# Patient Record
Sex: FEMALE | Race: WHITE | NOT HISPANIC OR LATINO | Employment: OTHER | URBAN - METROPOLITAN AREA
[De-identification: names, ages, dates, MRNs, and addresses within clinical notes are randomized per-mention and may not be internally consistent; named-entity substitution may affect disease eponyms.]

---

## 2021-08-19 ENCOUNTER — TRANSFERRED RECORDS (OUTPATIENT)
Dept: MULTI SPECIALTY CLINIC | Facility: CLINIC | Age: 61
End: 2021-08-19

## 2022-02-23 ENCOUNTER — TRANSFERRED RECORDS (OUTPATIENT)
Dept: MULTI SPECIALTY CLINIC | Facility: CLINIC | Age: 62
End: 2022-02-23

## 2022-06-27 ENCOUNTER — OFFICE VISIT (OUTPATIENT)
Dept: URGENT CARE | Facility: URGENT CARE | Age: 62
End: 2022-06-27
Payer: COMMERCIAL

## 2022-06-27 VITALS
WEIGHT: 165 LBS | HEART RATE: 86 BPM | BODY MASS INDEX: 32.39 KG/M2 | SYSTOLIC BLOOD PRESSURE: 124 MMHG | RESPIRATION RATE: 15 BRPM | DIASTOLIC BLOOD PRESSURE: 80 MMHG | HEIGHT: 60 IN | OXYGEN SATURATION: 97 % | TEMPERATURE: 97.9 F

## 2022-06-27 DIAGNOSIS — S01.81XA FACIAL LACERATION, INITIAL ENCOUNTER: Primary | ICD-10-CM

## 2022-06-27 PROCEDURE — 12013 RPR F/E/E/N/L/M 2.6-5.0 CM: CPT | Performed by: PHYSICIAN ASSISTANT

## 2022-06-27 PROCEDURE — 90715 TDAP VACCINE 7 YRS/> IM: CPT | Performed by: PHYSICIAN ASSISTANT

## 2022-06-27 PROCEDURE — 90471 IMMUNIZATION ADMIN: CPT | Performed by: PHYSICIAN ASSISTANT

## 2022-06-27 RX ORDER — INFLIXIMAB 100 MG/10ML
INJECTION, POWDER, LYOPHILIZED, FOR SOLUTION INTRAVENOUS
COMMUNITY
Start: 2021-11-05 | End: 2023-02-14

## 2022-06-28 NOTE — PROGRESS NOTES
URGENT CARE VISIT:    SUBJECTIVE:   Maeve Lovell is a 61 year old female who presents to the clinic with a laceration on the left cheek sustained today.  This is a non-work related injury.  Mechanism of injury: hit by pipe from outdoor clock.    Associated symptoms: Denies loss of consciousness, vomiting or confusion.  Last tetanus booster within 10 years: no    OBJECTIVE:  VITALS: /80   Pulse 86   Temp 97.9  F (36.6  C) (Temporal)   Resp 15   Ht 1.524 m (5')   Wt 74.8 kg (165 lb)   SpO2 97%   BMI 32.22 kg/m    General: WDWN in NAD  Size of laceration: 3 centimeters  Location of laceration: left cheek  Characteristics of the laceration: bleeding- mild, straight and superficial  Sensation to light touch intact: yes        ASSESSMENT:    ICD-10-CM    1. Facial laceration, initial encounter  S01.81XA REPAIR SUPERFICIAL, WOUND FACE/EAR 2.6-5.0 CM       PLAN:  PROCEDURE NOTE:  Wound cleaned with HIBICLENS  Dermabond was applied      Patient Instructions   Patient was educated on the natural course of injury.  Dermabond applied. Keep wound dry and clean. Wash area with soap and water. Watch for signs of infection such as redness or purulent drainage. Conservative measures discussed including over-the-counter Tylenol as needed for pain. See your primary care provider in 5 days if there is no improvement or sooner as needed. Seek emergency care if you develop fever, streaking, severe pain or rapidly spreading redness.       Patient verbalized understanding and is agreeable to plan. The patient was discharged ambulatory and in stable condition.    Leonarda Valencia PA-C ....................  6/27/2022   8:13 PM

## 2022-06-28 NOTE — PATIENT INSTRUCTIONS
Patient was educated on the natural course of injury.  Dermabond applied. Keep wound dry and clean. Wash area with soap and water. Watch for signs of infection such as redness or purulent drainage. Conservative measures discussed including over-the-counter Tylenol as needed for pain. See your primary care provider in 5 days if there is no improvement or sooner as needed. Seek emergency care if you develop fever, streaking, severe pain or rapidly spreading redness.

## 2022-08-03 ENCOUNTER — OFFICE VISIT (OUTPATIENT)
Dept: FAMILY MEDICINE | Facility: CLINIC | Age: 62
End: 2022-08-03
Payer: COMMERCIAL

## 2022-08-03 VITALS
HEART RATE: 80 BPM | OXYGEN SATURATION: 97 % | RESPIRATION RATE: 16 BRPM | WEIGHT: 166 LBS | TEMPERATURE: 98.6 F | DIASTOLIC BLOOD PRESSURE: 66 MMHG | SYSTOLIC BLOOD PRESSURE: 112 MMHG | BODY MASS INDEX: 33.47 KG/M2 | HEIGHT: 59 IN

## 2022-08-03 DIAGNOSIS — Z00.00 ROUTINE GENERAL MEDICAL EXAMINATION AT A HEALTH CARE FACILITY: Primary | ICD-10-CM

## 2022-08-03 DIAGNOSIS — L90.0 LICHEN SCLEROSUS: ICD-10-CM

## 2022-08-03 DIAGNOSIS — M05.79 RHEUMATOID ARTHRITIS INVOLVING MULTIPLE SITES WITH POSITIVE RHEUMATOID FACTOR (H): ICD-10-CM

## 2022-08-03 DIAGNOSIS — K51.00 ULCERATIVE PANCOLITIS WITHOUT COMPLICATION (H): ICD-10-CM

## 2022-08-03 DIAGNOSIS — M81.0 OSTEOPOROSIS, UNSPECIFIED OSTEOPOROSIS TYPE, UNSPECIFIED PATHOLOGICAL FRACTURE PRESENCE: ICD-10-CM

## 2022-08-03 DIAGNOSIS — M54.50 BILATERAL LOW BACK PAIN WITHOUT SCIATICA, UNSPECIFIED CHRONICITY: ICD-10-CM

## 2022-08-03 DIAGNOSIS — M21.372 LEFT FOOT DROP: ICD-10-CM

## 2022-08-03 LAB
ANION GAP SERPL CALCULATED.3IONS-SCNC: 9 MMOL/L (ref 3–14)
BUN SERPL-MCNC: 12 MG/DL (ref 7–30)
CALCIUM SERPL-MCNC: 9.4 MG/DL (ref 8.5–10.1)
CHLORIDE BLD-SCNC: 105 MMOL/L (ref 94–109)
CHOLEST SERPL-MCNC: 169 MG/DL
CO2 SERPL-SCNC: 25 MMOL/L (ref 20–32)
CREAT SERPL-MCNC: 0.63 MG/DL (ref 0.52–1.04)
FASTING STATUS PATIENT QL REPORTED: NO
GFR SERPL CREATININE-BSD FRML MDRD: >90 ML/MIN/1.73M2
GLUCOSE BLD-MCNC: 90 MG/DL (ref 70–99)
HDLC SERPL-MCNC: 64 MG/DL
LDLC SERPL CALC-MCNC: 90 MG/DL
NONHDLC SERPL-MCNC: 105 MG/DL
POTASSIUM BLD-SCNC: 3.6 MMOL/L (ref 3.4–5.3)
SODIUM SERPL-SCNC: 139 MMOL/L (ref 133–144)
TRIGL SERPL-MCNC: 76 MG/DL

## 2022-08-03 PROCEDURE — 99213 OFFICE O/P EST LOW 20 MIN: CPT | Mod: 25 | Performed by: NURSE PRACTITIONER

## 2022-08-03 PROCEDURE — 90471 IMMUNIZATION ADMIN: CPT | Performed by: NURSE PRACTITIONER

## 2022-08-03 PROCEDURE — 36415 COLL VENOUS BLD VENIPUNCTURE: CPT | Performed by: NURSE PRACTITIONER

## 2022-08-03 PROCEDURE — 80048 BASIC METABOLIC PNL TOTAL CA: CPT | Performed by: NURSE PRACTITIONER

## 2022-08-03 PROCEDURE — 80061 LIPID PANEL: CPT | Performed by: NURSE PRACTITIONER

## 2022-08-03 PROCEDURE — 90750 HZV VACC RECOMBINANT IM: CPT | Performed by: NURSE PRACTITIONER

## 2022-08-03 PROCEDURE — 99386 PREV VISIT NEW AGE 40-64: CPT | Mod: 25 | Performed by: NURSE PRACTITIONER

## 2022-08-03 RX ORDER — PREDNISONE 5 MG/1
TABLET ORAL
COMMUNITY
Start: 2022-06-21 | End: 2023-02-14

## 2022-08-03 RX ORDER — PREDNISONE 20 MG/1
TABLET ORAL
COMMUNITY
Start: 2022-03-05 | End: 2023-03-07

## 2022-08-03 RX ORDER — CLOBETASOL PROPIONATE 0.5 MG/G
OINTMENT TOPICAL
COMMUNITY
Start: 2021-08-24

## 2022-08-03 RX ORDER — VEDOLIZUMAB 300 MG/5ML
INJECTION, POWDER, LYOPHILIZED, FOR SOLUTION INTRAVENOUS
COMMUNITY
End: 2023-02-14

## 2022-08-03 RX ORDER — VALACYCLOVIR HYDROCHLORIDE 500 MG/1
500 TABLET, FILM COATED ORAL 2 TIMES DAILY
COMMUNITY
Start: 2021-08-31

## 2022-08-03 ASSESSMENT — ENCOUNTER SYMPTOMS
HEARTBURN: 1
COUGH: 0
ABDOMINAL PAIN: 0
JOINT SWELLING: 1
SORE THROAT: 0
BREAST MASS: 0
SHORTNESS OF BREATH: 0
CHILLS: 0
PARESTHESIAS: 0
NERVOUS/ANXIOUS: 0
DIZZINESS: 0
MYALGIAS: 0
DIARRHEA: 1
FEVER: 0
FREQUENCY: 0
EYE PAIN: 0
WEAKNESS: 0
HEMATURIA: 0
CONSTIPATION: 1
HEMATOCHEZIA: 1
ARTHRALGIAS: 1
NAUSEA: 0
PALPITATIONS: 0
HEADACHES: 0
DYSURIA: 0

## 2022-08-03 NOTE — PROGRESS NOTES
SUBJECTIVE:   CC: Maeve Lovell is an 61 year old woman who presents for preventive health visit.   Patient has been advised of split billing requirements and indicates understanding: Yes  Healthy Habits:     Getting at least 3 servings of Calcium per day:  Yes    Bi-annual eye exam:  NO    Dental care twice a year:  Yes    Sleep apnea or symptoms of sleep apnea:  Daytime drowsiness    Diet:  Regular (no restrictions)    Frequency of exercise:  2-3 days/week    Duration of exercise:  15-30 minutes    Taking medications regularly:  Yes    Medication side effects:  Other    PHQ-2 Total Score: 1    Additional concerns today:  Yes    She is here to establish care.  Currently sees specialists for UC and RA.  She was recently in the ED for foot drop, back pain.  She did have a lumbar MRI, multilevel DDD, needs PT referral.          Today's PHQ-2 Score:   PHQ-2 ( 1999 Pfizer) 8/3/2022   Q1: Little interest or pleasure in doing things 1   Q2: Feeling down, depressed or hopeless 0   PHQ-2 Score 1   Q1: Little interest or pleasure in doing things More than half the days   Q2: Feeling down, depressed or hopeless Not at all   PHQ-2 Score 2       Abuse: Current or Past (Physical, Sexual or Emotional) - No  Do you feel safe in your environment? Yes    Have you ever done Advance Care Planning? (For example, a Health Directive, POLST, or a discussion with a medical provider or your loved ones about your wishes): Yes, patient states has an Advance Care Planning document and will bring a copy to the clinic.    Social History     Tobacco Use     Smoking status: Never Smoker     Smokeless tobacco: Never Used   Substance Use Topics     Alcohol use: Not Currently     If you drink alcohol do you typically have >3 drinks per day or >7 drinks per week? No    Alcohol Use 8/3/2022   Prescreen: >3 drinks/day or >7 drinks/week? No   Prescreen: >3 drinks/day or >7 drinks/week? -       Reviewed orders with patient.  Reviewed health  maintenance and updated orders accordingly - Yes      Breast Cancer Screening:    FHS-7:   Breast CA Risk Assessment (FHS-7) 8/3/2022   Did any of your first-degree relatives have breast or ovarian cancer? Yes   Did any of your relatives have bilateral breast cancer? Unknown   Did any man in your family have breast cancer? No   Did any woman in your family have breast and ovarian cancer? Yes   Did any woman in your family have breast cancer before age 50 y? No   Do you have 2 or more relatives with breast and/or ovarian cancer? Yes   Do you have 2 or more relatives with breast and/or bowel cancer? Yes         Pertinent mammograms are reviewed under the imaging tab.    History of abnormal Pap smear: NO - age 30-65 PAP every 5 years with negative HPV co-testing recommended     Reviewed and updated as needed this visit by clinical staff   Tobacco  Allergies  Meds  Problems  Med Hx  Surg Hx  Fam Hx  Soc   Hx          Reviewed and updated as needed this visit by Provider   Tobacco  Allergies  Meds  Problems  Med Hx  Surg Hx  Fam Hx               Review of Systems   Constitutional: Negative for chills and fever.   HENT: Negative for congestion, ear pain, hearing loss and sore throat.    Eyes: Negative for pain and visual disturbance.   Respiratory: Negative for cough and shortness of breath.    Cardiovascular: Negative for chest pain, palpitations and peripheral edema.   Gastrointestinal: Positive for constipation, diarrhea, heartburn and hematochezia. Negative for abdominal pain and nausea.   Breasts:  Negative for tenderness, breast mass and discharge.   Genitourinary: Negative for dysuria, frequency, genital sores, hematuria, pelvic pain, urgency, vaginal bleeding and vaginal discharge.   Musculoskeletal: Positive for arthralgias and joint swelling. Negative for myalgias.   Skin: Negative for rash.   Neurological: Negative for dizziness, weakness, headaches and paresthesias.   Psychiatric/Behavioral:  "Negative for mood changes. The patient is not nervous/anxious.           OBJECTIVE:   /66   Pulse 80   Temp 98.6  F (37  C) (Temporal)   Resp 16   Ht 1.51 m (4' 11.45\")   Wt 75.3 kg (166 lb)   SpO2 97%   BMI 33.02 kg/m    Physical Exam  GENERAL: healthy, alert and no distress  EYES: Eyes grossly normal to inspection, PERRL and conjunctivae and sclerae normal  HENT: ear canals and TM's normal, nose and mouth without ulcers or lesions  NECK: no adenopathy, no asymmetry, masses, or scars and thyroid normal to palpation  RESP: lungs clear to auscultation - no rales, rhonchi or wheezes  CV: regular rate and rhythm, normal S1 S2, no S3 or S4, no murmur, click or rub, no peripheral edema and peripheral pulses strong  ABDOMEN: soft, nontender, no hepatosplenomegaly, no masses and bowel sounds normal  MS: no gross musculoskeletal defects noted, no edema  SKIN: no suspicious lesions or rashes  NEURO: Normal strength and tone, mentation intact and speech normal  PSYCH: mentation appears normal, affect normal/bright        ASSESSMENT/PLAN:   (Z00.00) Routine general medical examination at a health care facility  (primary encounter diagnosis)  Comment:   Plan: Lipid panel reflex to direct LDL Non-fasting,         Basic metabolic panel  (Ca, Cl, CO2, Creat,         Gluc, K, Na, BUN)            (M05.79) Rheumatoid arthritis involving multiple sites with positive rheumatoid factor (H)  Comment:   Plan: She will continue to follow up with rheumatology.     (K51.00) Ulcerative pancolitis without complication (H)  Comment:   Plan: She will continue to follow up with GI.     (M21.372) Left foot drop  Comment:   Plan: Physical Therapy Referral        Referral to PT given.  If symptoms are not improving, will refer to spine specialist.     (M54.50) Bilateral low back pain without sciatica, unspecified chronicity  Comment:   Plan: Physical Therapy Referral        See above.     (M81.0) Osteoporosis, unspecified osteoporosis " "type, unspecified pathological fracture presence  Comment:   Plan: The current medical regimen is effective;  continue present plan and medications.     (L90.0) Lichen sclerosus  Comment:   Plan: SHe will continue to follow up with her gynecologist.         COUNSELING:  Reviewed preventive health counseling, as reflected in patient instructions    Estimated body mass index is 33.02 kg/m  as calculated from the following:    Height as of this encounter: 1.51 m (4' 11.45\").    Weight as of this encounter: 75.3 kg (166 lb).    Weight management plan: Discussed healthy diet and exercise guidelines    She reports that she has never smoked. She has never used smokeless tobacco.      Counseling Resources:  ATP IV Guidelines  Pooled Cohorts Equation Calculator  Breast Cancer Risk Calculator  BRCA-Related Cancer Risk Assessment: FHS-7 Tool  FRAX Risk Assessment  ICSI Preventive Guidelines  Dietary Guidelines for Americans, 2010  USDA's MyPlate  ASA Prophylaxis  Lung CA Screening    Kimberly Childress NP  [facility]  "

## 2022-08-15 ENCOUNTER — VIRTUAL VISIT (OUTPATIENT)
Dept: PHYSICAL THERAPY | Facility: CLINIC | Age: 62
End: 2022-08-15
Attending: NURSE PRACTITIONER
Payer: COMMERCIAL

## 2022-08-15 DIAGNOSIS — M54.50 BILATERAL LOW BACK PAIN WITHOUT SCIATICA, UNSPECIFIED CHRONICITY: ICD-10-CM

## 2022-08-15 DIAGNOSIS — M21.372 LEFT FOOT DROP: ICD-10-CM

## 2022-08-15 PROCEDURE — 97110 THERAPEUTIC EXERCISES: CPT | Mod: GP | Performed by: PHYSICAL THERAPIST

## 2022-08-15 PROCEDURE — 97161 PT EVAL LOW COMPLEX 20 MIN: CPT | Mod: GP | Performed by: PHYSICAL THERAPIST

## 2022-08-15 PROCEDURE — 97530 THERAPEUTIC ACTIVITIES: CPT | Mod: GP | Performed by: PHYSICAL THERAPIST

## 2022-08-16 ENCOUNTER — MYC MEDICAL ADVICE (OUTPATIENT)
Dept: FAMILY MEDICINE | Facility: CLINIC | Age: 62
End: 2022-08-16
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
## 2022-08-18 NOTE — TELEPHONE ENCOUNTER
Melony Alanis: pt inquring about in home PT referrals    Hi. The PT person said she will ask you to refer me to an in-home PT person, plus a spinal doctor. She mentioned the spinal doctor would be at [facility]. I really don t want to go to [place] so she said she thought I could go anywhere. Can I go to the [facility] clinic on [address]? Only because it s close and in [place].     HALIMA Monet RN  [facility]

## 2022-08-19 NOTE — TELEPHONE ENCOUNTER
[facility] on [address] [facility] is not in the [facility] network. Please redirect Pt to the following clinics.      1. [facility]  [address], [place], [place], [address]  Appointment Line:[phone]    2. [facility]; Appointment Line: [phone]  [place] ([facility])  [address] [place] [address]    [place]  [address] [place] [address]    3.[facility]  [address]   Appointment Line: [phone]    [facility]  [address] [place] [address]  Appointment Line: [phone]    [facility] Referral Rep

## 2022-08-19 NOTE — TELEPHONE ENCOUNTER
Osurv message sent to patient with the below information  HALIMA Monet RN  [facility]

## 2022-08-22 ENCOUNTER — VIRTUAL VISIT (OUTPATIENT)
Dept: PHYSICAL THERAPY | Facility: CLINIC | Age: 62
End: 2022-08-22
Payer: COMMERCIAL

## 2022-08-22 DIAGNOSIS — M21.372 LEFT FOOT DROP: Primary | ICD-10-CM

## 2022-08-22 DIAGNOSIS — M54.50 BILATERAL LOW BACK PAIN WITHOUT SCIATICA, UNSPECIFIED CHRONICITY: ICD-10-CM

## 2022-08-22 PROCEDURE — 97530 THERAPEUTIC ACTIVITIES: CPT | Mod: GP | Performed by: PHYSICAL THERAPIST

## 2022-08-22 PROCEDURE — 97110 THERAPEUTIC EXERCISES: CPT | Mod: GP | Performed by: PHYSICAL THERAPIST

## 2022-08-24 ENCOUNTER — MYC MEDICAL ADVICE (OUTPATIENT)
Dept: FAMILY MEDICINE | Facility: CLINIC | Age: 62
End: 2022-08-24

## 2022-08-24 DIAGNOSIS — M21.372 LEFT FOOT DROP: ICD-10-CM

## 2022-08-24 DIAGNOSIS — M54.50 BILATERAL LOW BACK PAIN WITHOUT SCIATICA, UNSPECIFIED CHRONICITY: Primary | ICD-10-CM

## 2022-08-26 ENCOUNTER — MYC MEDICAL ADVICE (OUTPATIENT)
Dept: FAMILY MEDICINE | Facility: CLINIC | Age: 62
End: 2022-08-26

## 2022-08-29 NOTE — TELEPHONE ENCOUNTER
Patient called to inquire about the status of her [employer] messages. States no one has gotten back to her. She needs in home PT and the spine referral sent to the corresponding location so she can schedule. Please assist. Thanks!

## 2022-08-30 NOTE — TELEPHONE ENCOUNTER
Bettie-     See pts message.     It does not appear a spine referral was placed, pended.     If wanting in home PT, home care referral would need to be placed    DUNIA HurtadoN RN  [facility]

## 2022-08-31 ENCOUNTER — VIRTUAL VISIT (OUTPATIENT)
Dept: PHYSICAL THERAPY | Facility: CLINIC | Age: 62
End: 2022-08-31
Payer: COMMERCIAL

## 2022-08-31 DIAGNOSIS — M54.50 BILATERAL LOW BACK PAIN WITHOUT SCIATICA, UNSPECIFIED CHRONICITY: ICD-10-CM

## 2022-08-31 DIAGNOSIS — M21.372 LEFT FOOT DROP: Primary | ICD-10-CM

## 2022-08-31 PROCEDURE — 97110 THERAPEUTIC EXERCISES: CPT | Mod: 59 | Performed by: PHYSICAL THERAPIST

## 2022-08-31 PROCEDURE — 97530 THERAPEUTIC ACTIVITIES: CPT | Mod: GP | Performed by: PHYSICAL THERAPIST

## 2022-09-12 ENCOUNTER — PATIENT OUTREACH (OUTPATIENT)
Dept: CARE COORDINATION | Facility: CLINIC | Age: 62
End: 2022-09-12

## 2022-09-12 NOTE — PROGRESS NOTES
Clinic Care Coordination Contact    Follow Up Progress Note      Assessment: Clinic care coordination referral received to assist patient in obtaining home care. Patient is not enrolled in clinic care coordination. Referral made to [facility] for assistance with finding a home care company.     Care Gaps:    Health Maintenance Due   Topic Date Due     HIV SCREENING  Never done     PAP  Never done     COVID-19 Vaccine (3 - Pfizer risk series) 05/14/2021     INFLUENZA VACCINE (1) 09/01/2022       Declined due to patient not enrolled.      Care Plans - None    Intervention/Education provided during outreach: Discussed options for home care. Patient will reach out to care team sooner than planned with new questions or concerns.     Plan: Referrals made to home care companies. [facility] accepted patient for services. Requested forms faxed to [facility]. They will contact patient to schedule initial assessment. No further needs at this time.     Care Coordinator will not follow up.     Aida Tracey RN, BSN, CPHN, CM  [facility] Ambulatory Care Management  [facility]  Phone: [phone]  Email: [email or web address]

## 2022-09-12 NOTE — PROGRESS NOTES
Connected Care Resource [place]    [facility] notification from Amb[facility] Care Coordination Clinic team requesting assistance in finding a home care agency to accept referral for skilled home care due to [facility] declining referral.     Home Care Referral placed for the following services:      Physical Therapy   Therapeutic Exercise        RN called the following home care agencies:      - [facility] (ph: [phone]), unable to reach anyone after waiting on hold 4 minutes.  Will try back later.  Called back, spoke with Stefany in intake - capped on commercial and MA plans at this time.    - [facility] (ph: [phone]), left a message for [place] in intake with referral information and requested return call with update on availability    - [facility] (ph: [phone]), left message with intake department with referral information and requested return call.    Return call received from Marcella with [facility] in intake (ph: [phone]) at 10:25am - based on information provided, they can accept referral and requested referral, F2F, and Facesheet be faxed to them for review/processing.  They will not call RN back unless something were to change with their ability to accept the referral after reviewing faxed information, otherwise they will start processing referral once they receive the fax and follow up with patient for scheduling and clinic directly for any additional orders/documentation needed.        RN faxed referral information via [employer]:     [facility]  Blank from RADHA WOODRUFF Sent on 9/12/2022 10:37 AM       RN called [facility] back and left message with intake department to cancel referral as another home care agency accepted referral.      FYI routed to PCP and RN CC, Aida Tracey, for awareness.      Radha Woodruff RN  Connected Care Resource Center  [facility] - Ambulatory Care  Management                        *Connected Care Resource Team does NOT follow patient ongoing. Referrals are identified based on internal discharge reports and the outreach is to ensure patient has an understanding of their discharge instructions.

## 2022-09-12 NOTE — LETTER
[facility] Care Coordination    [facility]  [address] [place]  [facility] [address]          To Whom It May Concern:    My name is Radha Patel, an RN Care Coordinator with [facility].  Please review attached home care referral for SOC services.    For any questions/concerns, and/or to update on the status of this referral, please call me directly at [phone].      Thank you,    Radha Patel, RN  [facility]  [facility] - Ambulatory Care Management                                             PRIMARY LOCATION: [facility]*   CSN:[id number]      PATIENT DEMOGRAPHICS:   Name: Maeve Lovell MRN: [id number]   Address: [address] [place] [address] Sex: Female   [facility]: [place] [address] : 1960   Home Phone: [phone] Work Phone:     County: [place] Cell Phone: [phone]       EMERGENCY CONTACT:  Contact Name: JerodParmjit Relationship: Friend   Home Phone: [phone] Work Phone:         Cell Phone:        GUARANTOR INFORMATION: Relationship to Patient: Self  Name: Maeve Lovell       Address: [address] [place] [address]  Account Type: Personal/family   [facility]//[place] [address] Employer: [employer]   Home Phone: [phone] Work Phone:          COVERAGE INFORMATION:  Primary Payor: [employer] Plan: [employer] [place]   Subscriber: Maeve Lovell Group #: [id number]   Subscriber Sex: Female       Subscriber : 1960 Patient Rel'ship: Self   Subscriber Effective Date:   Member Effective Date: 2022    Subscriber ID [id number] Member ID: [id number]      Secondary Payor:   Plan:     Subscriber:   Group #:     Subscriber Sex:         Subscriber :   Patient Rel'ship:     Subscriber Effective Date:   Member Effective Date:     Subscriber ID   Member ID:        PROVIDER INFORMATION:  Referring Physician:   Referring Address:     Referring Phone: N/A Referring Fax:     Primary  Physician: Samira Childress Primary Address: [address] [facility] [address]   Primary Phone: [phone] Primary Fax: [phone]             Documentation of Face to Face and Certification for Home Health Services     I attest that I saw or will see Maeve Lovell on this date:  8/3/2022     This encounter with the patient was in whole, or in part, for medical condition, which is the primary reason for Home Health Care:       Patient Active Problem List   Diagnosis     Rheumatoid arthritis involving multiple sites with positive rheumatoid factor (H)     Ulcerative pancolitis without complication (H)     Osteoporosis, unspecified osteoporosis type, unspecified pathological fracture presence     Lichen sclerosus      I certify that, based on my findings, the following services are medically necessary Home Health Services: Physical Therapy   Therapeutic Exercise     Additional services needed:        Further, I certify that my clinical findings support that this patient is homebound (i.e. absences from home require considerable and taxing effort and are for medical reasons or Protestant services or infrequently or of short duration when for other reasons) related to:Requires assistance of another person or specialized equipment is needed     Based on the above findings, I certify that this patient is confined to the home and needs intermittent skilled nursing care, physical therapy and/or speech therapy. The patient is under my care, and I have initiated the establishment of the plan of care. This patient will be followed by a physician who will periodically review the plan of care.        Physician/Provider to provide follow up care: Provider to follow patient: SAMIRA CHILDRESS [[id number]]        Please be aware that coverage of these services is subject to the terms and limitations of your health insurance plan.  Call member services at your health plan with any benefit or coverage  questions.  _______________________________________________________________________  Authorized by:  Kimberly Childress NP       PLEASE EVALUATE AND TREAT (Evaluation timeline is 24 - 48 hrs. Please call if there is need for a variance to this timeline).     Medications:         Current Outpatient Medications   Medication Sig Dispense Refill     clobetasol (TEMOVATE) 0.05 % external ointment Use a pea sized amount one to two times weekly as needed  Indications: Inflammation, lichen sclerosus         etanercept (ENBREL SURECLICK) 50 MG/ML autoinjector Inject 50 mg Subcutaneous every 7 days         inFLIXimab (REMICADE) 100 MG injection Administer Remicade 5mg/kg at weeks 0, 2 and 6. Infuse by IV route.         ONE DAILY MULTIPLE VITAMIN OR           predniSONE (DELTASONE) 20 MG tablet  (Patient not taking: Reported on 8/3/2022)         predniSONE (DELTASONE) 5 MG tablet  (Patient not taking: Reported on 8/3/2022)         valACYclovir (VALTREX) 500 MG tablet Take 500 mg by mouth 2 times daily         vedolizumab (ENTYVIO) 60 MG/ML injection            Problems:      Patient Active Problem List   Diagnosis     Rheumatoid arthritis involving multiple sites with positive rheumatoid factor (H)     Ulcerative pancolitis without complication (H)     Osteoporosis, unspecified osteoporosis type, unspecified pathological fracture presence     Lichen sclerosus      Diet:  None        Code Status:    Code Status: Not on file     Allergies:  Diagnostic x-ray materials             Order  Home Care Referral [9046.001] (Order [id number])    Referral Details    Referred By  Referred To   Kimberly Childress NP   [address] [facility] [address]   Phone: [phone]   Fax: [phone]    Diagnoses: Bilateral low back pain without sciatica, unspecified chronicity   Left foot drop   Order: Spine  Referral    [facility]TH[facility]   [address] [facility] [address]   Phone: [phone]   Fax:  [phone]      Comment: Please be aware that coverage of these services is subject to the terms and limitations of your health insurance plan.  Call member services at your health plan with any benefit or coverage questions.   [facility] will call you to coordinate your care as prescribed by your provider. If you don't hear from a representative within 2 business days, please call [phone].           Kimberly Childress NP   [address] [facility] [address]   Phone: [phone]   Fax: [phone]    Diagnoses: Bilateral low back pain without sciatica, unspecified chronicity   Left foot drop   Order: Home Care Referral    [address] [address]   Phone: [phone]      Patient Name   Maeve Lovell MRN   [id number] Legal Sex   Female    1960       Patient Demographics    Address   [address] [place] [address] Phone   [phone] (Home) *Preferred*   [phone] (Mobile) E-mail Address   [email or web address]     Base CPT Code (Reference Only)    Code CPT Chargeables   9046.001 9046      Existing Charges    Charge Line Charge Code Status Charge Trigger Charge Type   None          Order Information    Order Date/Time Release Date/Time Start Date/Time End Date/Time   22 12:38 PM None 2022 None     Order Details    Frequency Duration Priority Order Class   None None Routine: Next available opening  Home Care     Order Providers    Authorizing Provider Encounter Provider   Kimberly Childress NP Bottem, Jennifer M, NP     ABN Associated with this Order    There is no ABN associated with this order.                  Ordering Provider's NPI: [id number]  Kimberly Childress      Home Care Referral [[id number]]    Electronically signed by: Kimberly Childress NP on 22 0829 Status: Active (Cosignature Pending)   Ordering user: Kimberly Childress NP 22 2901   Cosigning events   Awaiting signature  from HIM DEFAULT ASSIGNED POOL for Ordering   Released by: Kimberly Carrion NP 08/30/22 1238     Order History  Outpatient  Date/Time Action Taken User Additional Information   08/30/22 1208 Pend Suri Cantu RN    08/30/22 1238 Sign Kimberly Carrion NP      Order Questions    Question Answer   Reason for Referral: Physical Therapy   Note: Must select at least one of Skilled Nursing, Physical Therapy, and/or Speech Therapy in addition to any other services.   Physical Therapy Eval and Treat for: Therapeutic Exercise   Is the patient homebound? Yes   Homebound Status (describe the functional limitations that support this patient is confined to his/her home. Medicaid recipients are not required to be homebound.): Requires assistance of another person or specialized equipment is needed   I attest that I saw or will see the patient on this date: 8/3/2022   Provider to follow patient KIMBERLY CARRION            Reference Links               Associated Diagnoses    Bilateral low back pain without sciatica, unspecified chronicity [M54.50]  - Primary       Left foot drop [M21.372]         Source Order Set    Order Set Name Order ID    [id number]     Collection Information        Encounter    View Encounter            Order Report    View Order Information     Lab and Collection    Home Care Referral (Order: [id number]) - 8/30/2022  External System: External ID:   HE EAP [id number]       Order Requisition    Home Care Referral (Order #[id number]) on 8/30/22     Reprint Order Requisition    Home Care Referral (Order #[id number]) on 8/30/22     Tracking Links    Cosign Tracking Order Transmittal Tracking                       Office Visit    8/3/2022  [facility]   Kimberly Carrion NP      Nurse Practitioner - Family  Routine general medical examination at a health care facility +6 more      Dx  Physical      Reason for Visit       Progress Notes  Kimberly Carrion NP (Nurse Practitioner)      Nurse Practitioner - Family         SUBJECTIVE:   CC: Maeve Lovell is an 61 year old woman who presents for preventive health visit.   Patient has been advised of split billing requirements and indicates understanding: Yes  Healthy Habits:     Getting at least 3 servings of Calcium per day:  Yes    Bi-annual eye exam:  NO    Dental care twice a year:  Yes    Sleep apnea or symptoms of sleep apnea:  Daytime drowsiness    Diet:  Regular (no restrictions)    Frequency of exercise:  2-3 days/week    Duration of exercise:  15-30 minutes    Taking medications regularly:  Yes    Medication side effects:  Other    PHQ-2 Total Score: 1    Additional concerns today:  Yes     She is here to establish care.  Currently sees specialists for UC and RA.  She was recently in the ED for foot drop, back pain.  She did have a lumbar MRI, multilevel DDD, needs PT referral.              Today's PHQ-2 Score:   PHQ-2 ( 1999 Pfizer) 8/3/2022   Q1: Little interest or pleasure in doing things 1   Q2: Feeling down, depressed or hopeless 0   PHQ-2 Score 1   Q1: Little interest or pleasure in doing things More than half the days   Q2: Feeling down, depressed or hopeless Not at all   PHQ-2 Score 2        Abuse: Current or Past (Physical, Sexual or Emotional) - No  Do you feel safe in your environment? Yes     Have you ever done Advance Care Planning? (For example, a Health Directive, POLST, or a discussion with a medical provider or your loved ones about your wishes): Yes, patient states has an Advance Care Planning document and will bring a copy to the clinic.     Social History           Tobacco Use     Smoking status: Never Smoker     Smokeless tobacco: Never Used   Substance Use Topics     Alcohol use: Not Currently      If you drink alcohol do you typically have >3 drinks per day or >7 drinks per week? No     Alcohol Use 8/3/2022   Prescreen: >3 drinks/day or >7 drinks/week? No   Prescreen: >3 drinks/day or >7 drinks/week? -          Reviewed orders with patient.  Reviewed health maintenance and updated orders accordingly - Yes        Breast Cancer Screening:     FHS-7:   Breast CA Risk Assessment (FHS-7) 8/3/2022   Did any of your first-degree relatives have breast or ovarian cancer? Yes   Did any of your relatives have bilateral breast cancer? Unknown   Did any man in your family have breast cancer? No   Did any woman in your family have breast and ovarian cancer? Yes   Did any woman in your family have breast cancer before age 50 y? No   Do you have 2 or more relatives with breast and/or ovarian cancer? Yes   Do you have 2 or more relatives with breast and/or bowel cancer? Yes            Pertinent mammograms are reviewed under the imaging tab.     History of abnormal Pap smear: NO - age 30-65 PAP every 5 years with negative HPV co-testing recommended  Reviewed and updated as needed this visit by clinical staff   Tobacco  Allergies  Meds  Problems  Med Hx  Surg Hx  Fam Hx  Soc   Hx            Reviewed and updated as needed this visit by Provider   Tobacco  Allergies  Meds  Problems  Med Hx  Surg Hx  Fam Hx                  Review of Systems   Constitutional: Negative for chills and fever.   HENT: Negative for congestion, ear pain, hearing loss and sore throat.    Eyes: Negative for pain and visual disturbance.   Respiratory: Negative for cough and shortness of breath.    Cardiovascular: Negative for chest pain, palpitations and peripheral edema.   Gastrointestinal: Positive for constipation, diarrhea, heartburn and hematochezia. Negative for abdominal pain and nausea.   Breasts:  Negative for tenderness, breast mass and discharge.   Genitourinary: Negative for dysuria, frequency, genital sores, hematuria, pelvic pain, urgency, vaginal bleeding and vaginal discharge.   Musculoskeletal: Positive for arthralgias and joint swelling. Negative for myalgias.   Skin: Negative for rash.   Neurological: Negative for dizziness,  "weakness, headaches and paresthesias.   Psychiatric/Behavioral: Negative for mood changes. The patient is not nervous/anxious.             OBJECTIVE:   /66   Pulse 80   Temp 98.6  F (37  C) (Temporal)   Resp 16   Ht 1.51 m (4' 11.45\")   Wt 75.3 kg (166 lb)   SpO2 97%   BMI 33.02 kg/m    Physical Exam  GENERAL: healthy, alert and no distress  EYES: Eyes grossly normal to inspection, PERRL and conjunctivae and sclerae normal  HENT: ear canals and TM's normal, nose and mouth without ulcers or lesions  NECK: no adenopathy, no asymmetry, masses, or scars and thyroid normal to palpation  RESP: lungs clear to auscultation - no rales, rhonchi or wheezes  CV: regular rate and rhythm, normal S1 S2, no S3 or S4, no murmur, click or rub, no peripheral edema and peripheral pulses strong  ABDOMEN: soft, nontender, no hepatosplenomegaly, no masses and bowel sounds normal  MS: no gross musculoskeletal defects noted, no edema  SKIN: no suspicious lesions or rashes  NEURO: Normal strength and tone, mentation intact and speech normal  PSYCH: mentation appears normal, affect normal/bright           ASSESSMENT/PLAN:   (Z00.00) Routine general medical examination at a health care facility  (primary encounter diagnosis)  Comment:   Plan: Lipid panel reflex to direct LDL Non-fasting,         Basic metabolic panel  (Ca, Cl, CO2, Creat,         Gluc, K, Na, BUN)             (M05.79) Rheumatoid arthritis involving multiple sites with positive rheumatoid factor (H)  Comment:   Plan: She will continue to follow up with rheumatology.      (K51.00) Ulcerative pancolitis without complication (H)  Comment:   Plan: She will continue to follow up with GI.      (M21.372) Left foot drop  Comment:   Plan: Physical Therapy Referral        Referral to PT given.  If symptoms are not improving, will refer to spine specialist.      (M54.50) Bilateral low back pain without sciatica, unspecified chronicity  Comment:   Plan: Physical Therapy " "Referral        See above.      (M81.0) Osteoporosis, unspecified osteoporosis type, unspecified pathological fracture presence  Comment:   Plan: The current medical regimen is effective;  continue present plan and medications.      (L90.0) Lichen sclerosus  Comment:   Plan: SHe will continue to follow up with her gynecologist.            COUNSELING:  Reviewed preventive health counseling, as reflected in patient instructions     Estimated body mass index is 33.02 kg/m  as calculated from the following:    Height as of this encounter: 1.51 m (4' 11.45\").    Weight as of this encounter: 75.3 kg (166 lb).     Weight management plan: Discussed healthy diet and exercise guidelines     She reports that she has never smoked. She has never used smokeless tobacco.        Counseling Resources:  ATP IV Guidelines  Pooled Cohorts Equation Calculator  Breast Cancer Risk Calculator  BRCA-Related Cancer Risk Assessment: FHS-7 Tool  FRAX Risk Assessment  ICSI Preventive Guidelines  Dietary Guidelines for Americans, 2010  [employer]'s MyPlate  ASA Prophylaxis  Lung CA Screening     Kimberly Childress NP  [facility]            Other Notes     All notes        Instructions       Preventive Health Recommendations  Female Ages 50 - 64     Yearly exam: See your health care provider every year in order to    Review health changes.     Discuss preventive care.      Review your medicines if your doctor has prescribed any.       Get a Pap test every three years (unless you have an abnormal result and your provider advises testing more often).    If you get Pap tests with HPV test, you only need to test every 5 years, unless you have an abnormal result.     You do not need a Pap test if your uterus was removed (hysterectomy) and you have not had cancer.    You should be tested each year for STDs (sexually transmitted diseases) if you're at risk.     Have a mammogram every 1 to 2 years.    Have a colonoscopy at age 50, " "or have a yearly FIT test (stool test). These exams screen for colon cancer.      Have a cholesterol test every 5 years, or more often if advised.    Have a diabetes test (fasting glucose) every three years. If you are at risk for diabetes, you should have this test more often.     If you are at risk for osteoporosis (brittle bone disease), think about having a bone density scan (DEXA).     Shots: Get a flu shot each year. Get a tetanus shot every 10 years.     Nutrition:     Eat at least 5 servings of fruits and vegetables each day.    Eat whole-grain bread, whole-wheat pasta and brown rice instead of white grains and rice.    Get adequate Calcium and Vitamin D.      Lifestyle    Exercise at least 150 minutes a week (30 minutes a day, 5 days a week). This will help you control your weight and prevent disease.    Limit alcohol to one drink per day.    No smoking.     Wear sunscreen to prevent skin cancer.     See your dentist every six months for an exam and cleaning.    See your eye doctor every 1 to 2 years.                  After Visit Summary (Automatic SnapShot taken 8/3/2022)      Additional Documentation    Vitals:  /66     Pulse 80     Temp 98.6  F (37  C) (Temporal)     Resp 16     Ht 1.51 m (4' 11.45\")     Wt 75.3 kg (166 lb)     SpO2 97%     BMI 33.02 kg/m      BSA 1.78 m      Flowsheets:  Patient-Reported Data,     Vitals Reassessment,     Anthropometrics        Encounter Info:  Billing Info,     History,     Allergies,     Detailed Report          Communications         Chart Routed to Abstract Quality Initiatives        Fv Hpi Annual Exam    Question 8/3/2022  1:25 PM CDT - Filed by Patient   I understand that completing this form is intended to provide my doctor and/or care team with helpful information for my upcoming clinic visit. It is not to notify my doctor and/or care team of medical matters requiring urgent attention. If I have an urgent medical matter, I should call 911 or my doctor's " office. Acknowledge   Outside of work, how many days during the week do you exercise? 2-3 days/week   If you drink alcohol do you typically have greater than 3 drinks per day OR greater than 7 drinks per week? No   Do you get at least 3 servings of foods that have calcium each day (dairy, green leafy vegetables, etc)? Yes   Do you have a special diet?  regular (no restrictions)   Do you have any problems taking medications regularly? No   Side effects from medication: Other   Have you had an eye exam in the past two years? No   Do you see a dentist twice per year? Yes   Do you have sleep apnea, excessive snoring or daytime drowsiness? Daytime drowsiness   .    Abdominal pain No   Blood in stool Yes   Blood in urine No   Chest pain No   Chills No   Congestion No   Constipation Yes   Cough No   Diarrhea Yes   Dizziness No   Ear pain No   Eye pain No   Feeling nervous or anxious No   Fever No   Frequent urination No   Genital sores No   Headaches No   Hearing loss No   Heartburn Yes   Joint pain Yes   Joint swelling Yes   Leg swelling No   Mood changes No   Muscle pain No   Nausea No   Painful urination No   Pounding in the chest No   Skin sensation changes No   Sore throat No   Sudden urge to urinate No   Rash No   Shortness of breath No   Vision change No   Weakness No   .    Pelvic pain No   Vaginal bleeding No   Vaginal discharge No   Breast tenderness No   Breast lump No   Breast discharge No   Over the past 2 weeks, how often have you been bothered by any of the following problems?    Q1: Little interest or pleasure in doing things More than half the days   Q2: Feeling down, depressed or hopeless Not at all   Do you have any additional concerns to address? Yes (please make note of and bring with you to your appointment)   PHQ-2 Score (range: 0 - 6) 2   Outside of work, approximately how many minutes a day do you exercise? 15-30 minutes   The purpose of this visit is to discuss your medical history and prevent  health problems before you are sick.  You may be responsible for a copay, coinsurance or deductible if your visit today includes services such as checking on a sore throat, having an x-ray or lab test, or treating and evaluating a new or existing condition.  If you have questions about your preventive care benefits, please contact your insurance company's member services department.      Phq2 (   1999 [employer] Inc,All Rights Reserved. Used With Permission. Developed By Chadd Balderrama,Keara Ewing,Juvenal Chao And Colleagues,With An Educational Johny From [employer] Inc.)    Question 8/3/2022  1:25 PM CDT - Filed by Patient   Q1: Little interest or pleasure in doing things Several days   Q2: Feeling down, depressed or hopeless Not at all   PHQ-2 Score (range: 0 - 6) 1     Amb Fhs-7 Filter Question    Question 8/3/2022  1:26 PM CDT - Filed by Patient   Do you have a family history of breast, colon, or ovarian cancer? Yes   Did any of your 1st degree relatives have breast or ovarian cancer? Yes Abnormal    Did any of your relatives have bilateral breast cancer? Unknown   Did any man in your family have breast cancer? No   Did any woman in your family have breast and ovarian cancer?  Yes Abnormal    Did any woman in your family have breast cancer before the age of 50 years? No   Do you have 2 or more relatives with breast and/or ovarian cancer?  Yes Abnormal    Do you have 2 or more relatives with breast and/or bowel cancer? Yes Abnormal      Welcome File Ready To Room Event    Question 8/3/2022  1:26 PM CDT - Filed by Patient   Press 'Submit' to complete your questionnaires. Submit     AVS Reports    Date/Time Report Action User   8/3/2022  7:25 PM After Visit Summary Automatically Generated Kimberly Childress NP   8/3/2022  3:17 PM After Visit Summary Printed Ariana Fernandez   8/3/2022  3:01 PM After Visit Summary Printed Kimberly Childress NP     Encounter Information    Encounter Information    Provider  Department Encounter # Center   8/3/2022 2:30 PM Kimberly Childress NP [facility] [id number] [facility]     Reviewed this Encounter     Medications Problems Allergies History   Kimberly Childress NP   Reviewed Family, Medical, Surgical, Tobacco   Loesch, Ariana R    Alcohol, Custom, Drug Use, Family, Medical, Obstetric, Sexual Activity, Surgical, Tobacco     Communicable/Travel screen    Communicable/Travel Screening 6/27/2022 8/3/2022   Do you have any of the following symptoms? None of these None of these   View Complete Flowsheet          Orders Placed         Basic metabolic panel  (Ca, Cl, CO2, Creat, Gluc, K, Na, BUN)         Lipid panel reflex to direct LDL Non-fasting         Physical Therapy Referral Authorized         PRIMARY CARE FOLLOW-UP SCHEDULING         REVIEW OF HEALTH MAINTENANCE PROTOCOL ORDERS       All Encounter Results     Medication Changes         None       Medication List     Visit Diagnoses         Routine general medical examination at a health care facility         Rheumatoid arthritis involving multiple sites with positive rheumatoid factor (H)         Ulcerative pancolitis without complication (H)         Left foot drop         Bilateral low back pain without sciatica, unspecified chronicity         Osteoporosis, unspecified osteoporosis type, unspecified pathological fracture presence         Lichen sclerosus       Problem List     Immunizations Given     Zoster vaccine recombinant adjuvanted (SHINGRIX)     Immunization History

## 2022-12-04 ENCOUNTER — NURSE TRIAGE (OUTPATIENT)
Dept: NURSING | Facility: CLINIC | Age: 62
End: 2022-12-04

## 2022-12-04 NOTE — TELEPHONE ENCOUNTER
Patient had surgery 1 1/2 weeks ago.    She has a stoma.  She states there was not much coming out.  Today she has had pain when things come out. Pain stops after she is done eliminating.  No abdominal pain.  Only pain where the stoma is.  No fever.  Patient has a lot of lower back pain.  No pain on urination. No foul odor in urine.    Per protocol patient advised to call her surgeon. Patient agrees with the plan.    Mariangel Reeves RN on 12/4/2022 at 1:23 PM      Reason for Disposition    [1] Caller has URGENT question AND [2] triager unable to answer question    Additional Information    Negative: Shock suspected (e.g., cold/pale/clammy skin, too weak to stand, low BP, rapid pulse)    Negative: Sounds like a life-threatening emergency to the triager    Negative: [1] Post-op AND [2] and general post-operative symptoms or questions, unrelated to ostomy    Negative: [1] SEVERE abdominal pain (e.g., excruciating) AND [2] present > 1 hour    Negative: Bloody, tarry, or black-colored stool (not dark green)    Negative: [1] Bleeding from stoma tissue (stoma is moist, pink to red-colored tissue) AND [2] won't stop after 10 minutes of direct pressure (using correct technique)    Negative: Stoma separates from the surrounding skin at the suture line (e.g., gaping wound next to stoma)    Negative: Stoma turns purple or black    Negative: [1] No stool or gas from ILEOSTOMY > 6 hours AND [2] abdominal pain or vomiting    Negative: [1] No stool or gas from ILEOSTOMY > 6 hours AND [2] no improvement after using CARE ADVICE    Negative: [1] No stool or gas from COLOSTOMY > 48 hours (2 days) AND [2] abdominal pain or vomiting    Negative: [1] Drinking very little AND [2] dehydration suspected (e.g., no urine > 12 hours, very dry mouth, very lightheaded)    Negative: Patient sounds very sick or weak to the triager    Negative: [1] MILD-MODERATE abdominal pain AND [2] constant AND [3] present > 2 hours    Negative: [1] Vomiting AND  [2] abdomen looks much more swollen than usual    Negative: [1] Vomiting AND [2] contains bile (green color)    Negative: [1] Abdomen skin around stoma looks infected (spreading redness, pain) AND [2] fever    Negative: No stool or gas from ILEOSTOMY > 6 hours (and has not tried CARE ADVICE)    Negative: SEVERE diarrhea (e.g., 1,500 ml or more a day; or twice as much as usual)    Protocols used: OSTOMY SYMPTOMS AND PDJPPSBAS-H-BE

## 2022-12-13 ENCOUNTER — HOSPITAL ENCOUNTER (OUTPATIENT)
Dept: WOUND CARE | Facility: CLINIC | Age: 62
Discharge: HOME | End: 2022-12-13
Admitting: NURSE PRACTITIONER
Payer: COMMERCIAL

## 2022-12-13 PROCEDURE — G0463 HOSPITAL OUTPT CLINIC VISIT: HCPCS

## 2022-12-13 NOTE — PROGRESS NOTES
"[facility]  OUTPATIENT OSTOMY ASSESSMENT    INTAKE  Type of Stoma: Temporary Ileostomy end  Anticipated date of takedown: unknown TBD    Diagnosis Pertinent to Stoma: Ulcerative Colitis   Type of Surgery: APR   Surgery Date: 11/23/22  Surgeon:Dr. Salazar     Hospital: [facility]    Purpose of this visit: post op follow up for itching and skin breakdown    Pertinent Information: Pt has scattered small pustules and satellite lesions to peristomal tissue. Pt has been cutting her pouch to 1 1/2\" which is too small. She has [facility] follow up with [facility] in JAN.     Present for Teaching Session: Patient   Present: No      Current Equipment: [place] 2pc flat CTF with barrier ring  Pouch Change Frequency: every 3 days   Provider of Care: Emptying: patient Pouch Change: patient    ASSESSMENT        Stoma Size: Round 1 5/8\" inches  Protrusion: mild  Stoma Appearance: Red, Os points downward at 6 O'clock.  Mucocutaneous Juncture: Separation at 7-8 O'clock and Sutures Present   Peristomal Skin: Erythema, Denudement and Rash  Abdominal Assessment: laparoscopic incisions with glue, large abdomen    TREATMENT  Applied Stoma Powder, Applied No Sting Skin barrier and Applied: antifungal powder    INTERVENTIONS  Refitting done, Educated on peristomal skin treatment and Educated on pouch change procedure    INSTRUCTIONS GIVEN  Pouching changes, increase frequency until skin has healed, purchasing miconazole powder, Support group, follow up    PLAN  Continue same Pouching System and Change in Supplies: See Outpatient Ostomy Instructions      Total Time Spent with Patient: 60 minutes    OUTPATIENT OSTOMY INSTRUCTIONS                                                                        Type of Stoma: Ileostomy         Supplier: [employer] [phone]      Equipment:    Product # Brand Description   Pouch 23080 [place] 2 pc Drainable   Flange 53077 [place] Cut to fit 1 3/4\" flat   Ring " "8805 [place] Adapt   Powder 7906 Narc[address] Adapt   Liquid Skin Barrier 3344 3M Cavilon No Sting        **At [facility] I would recommend inquiring about getting you into a convex barrier and getting order number for precut pouch.     Procedure:  Close the bottom of the pouch. Cut the opening of your pouch to the line measuring 1 3/4\" (follow pattern or 1/8- 1/4 inch larger than stoma) and then remove backings from adhesive surfaces.  Remove the soiled pouch and discard.  Wash skin with water and dry.    Then: Apply stoma powder to any raw or irritated tissue and to the MCJ separation at 8 O'clock.   Then: Starting at stoma blot all tissue with Cavilon No sting skin prep (liquid bandage-helps seal the powder in)  Then: Apply Ring: Directly around stoma or around the cut opening of the barrier               Then: Apply pouching system to stoma site and hold in place for 2-5 minutes.     ______________________________________________________________________________    Pouch Change: Three times weekly (Until fungal rash resolves) then return to twice weekly       [facility] Nurse Specialist: Kenyon SALINAS CWOCN             Questions: [facility] [phone] ()      Follow-up Appointment: With [facility] ([facility]) and [facility]. Please call [facility] [phone] () to request an appointment.      "

## 2022-12-26 ENCOUNTER — TELEPHONE (OUTPATIENT)
Dept: WOUND CARE | Facility: CLINIC | Age: 62
End: 2022-12-26

## 2022-12-26 ENCOUNTER — HEALTH MAINTENANCE LETTER (OUTPATIENT)
Age: 62
End: 2022-12-26

## 2022-12-26 NOTE — TELEPHONE ENCOUNTER
Patient called with concerns of Ileostomy pouch leakages. Pt still having peristomal breakdown and hoping to be seen, however she is out of town until 12/28. Apt scheduled to see patient 12/29 at 1:00pm with [facility].     Kenyon Ann RN CWOCN  Dept. Pager: [phone]  Dept. Office Number: [phone]

## 2022-12-29 ENCOUNTER — HOSPITAL ENCOUNTER (OUTPATIENT)
Dept: WOUND CARE | Facility: CLINIC | Age: 62
Discharge: HOME | End: 2022-12-29
Admitting: NURSE PRACTITIONER
Payer: COMMERCIAL

## 2022-12-29 PROCEDURE — G0463 HOSPITAL OUTPT CLINIC VISIT: HCPCS

## 2022-12-29 NOTE — PROGRESS NOTES
"[facility]  OUTPATIENT OSTOMY ASSESSMENT    INTAKE  Type of Stoma: Temporary Ileostomy end  Anticipated date of takedown: unknown TBD    Diagnosis Pertinent to Stoma: Ulcerative Colitis   Type of Surgery: APR   Surgery Date: 11/23/22  Surgeon:Dr. Salazar     Hospital: [facility]    Purpose of this visit: post op follow up for skin breakdown.     Pertinent Information: MCJ dehisced areas have healed. Pt continues to have a few  scattered small pustules to from 3-9 O'clock.  Has been using antifungal powder with some resolve, suspect bacterial component also and will trial thin layer of bacitracin. Mild leakage from 4-9 O'clock of stool pt would benefit soft convexity. She has [facility] follow up with [facility] in JAN.     Present for Teaching Session: Patient   Present: No      Current Equipment: Narciso 2pc flat CTF with barrier ring  Pouch Change Frequency: every 2-3 days   Provider of Care: Emptying: patient Pouch Change: patient    ASSESSMENT        Stoma Size: Round 1 5/8\" inches  Protrusion: mild  Stoma Appearance: Red, Os points downward at 6 O'clock.  Mucocutaneous Juncture: Intact and Sutures Present   Peristomal Skin: Rash few scattered pustules but has improved significantly with antifungal powder  Abdominal Assessment: laparoscopic incisions with glue, large abdomen    TREATMENT  Applied Stoma Powder and Applied No Sting Skin barrier    INTERVENTIONS  Refitting done, Educated on peristomal skin treatment and Educated on pouch change procedure    INSTRUCTIONS GIVEN  Pouching changes, peristomal skin issues use bacitracin for rash until resolves, Precut pouches ordered    PLAN  Change in Supplies: See Outpatient Ostomy Instructions      Total Time Spent with Patient: 45 minutes    OUTPATIENT OSTOMY INSTRUCTIONS                                                                        Type of Stoma: Ileostomy         Supplier: [employer] [phone]      Equipment:    " "Product # Brand Description   Pouch [address]66 [place] 2 pc Drainable   Flange [address]17 [place] Pre cut 1 3/4\" convex   Ring [address] [place] Adapt   Powder 7906 [place] Adapt   Liquid Skin Barrier 3344 3M Cavilon No Sting          Procedure:  Close the bottom of the pouch. Cut the opening of your pouch to the line measuring 1 3/4\" (follow pattern or 1/8- 1/4 inch larger than stoma) and then remove backings from adhesive surfaces.  Remove the soiled pouch and discard.  Wash skin with water and dry.    Then: Very thin layer of bacitracin, rub in and allow to completely dry. Then Apply stoma powder and blot with Cavilon No Sting Skin Prep   Then: Apply Ring: Directly around stoma or around the cut opening of the barrier               Then: Apply pouching system to stoma site and hold in place for 2-5 minutes.     ______________________________________________________________________________    Pouch Change: Three times weekly (Until rash resolves) then return to twice weekly       [facility] Nurse Specialist: Kenyon SALINAS CWOCN             Questions: [facility] [phone] ()      Follow-up Appointment: With [facility] ([place] [facility]) and [facility] PR[facility]. Please call [facility] [phone] () to request an appointment.      "

## 2023-01-09 ENCOUNTER — ALLIED HEALTH/NURSE VISIT (OUTPATIENT)
Dept: FAMILY MEDICINE | Facility: CLINIC | Age: 63
End: 2023-01-09
Payer: COMMERCIAL

## 2023-01-09 DIAGNOSIS — Z23 NEED FOR VACCINATION: Primary | ICD-10-CM

## 2023-01-09 DIAGNOSIS — Z23 HIGH PRIORITY FOR 2019-NCOV VACCINE: ICD-10-CM

## 2023-01-09 PROCEDURE — 0124A COVID-19 VACCINE BIVALENT BOOSTER 12+ (PFIZER): CPT

## 2023-01-09 PROCEDURE — 90471 IMMUNIZATION ADMIN: CPT

## 2023-01-09 PROCEDURE — 91312 COVID-19 VACCINE BIVALENT BOOSTER 12+ (PFIZER): CPT

## 2023-01-09 PROCEDURE — 90750 HZV VACC RECOMBINANT IM: CPT

## 2023-01-10 ENCOUNTER — TRANSFERRED RECORDS (OUTPATIENT)
Dept: HEALTH INFORMATION MANAGEMENT | Facility: CLINIC | Age: 63
End: 2023-01-10

## 2023-02-13 ENCOUNTER — TELEPHONE (OUTPATIENT)
Dept: FAMILY MEDICINE | Facility: CLINIC | Age: 63
End: 2023-02-13
Payer: COMMERCIAL

## 2023-02-13 NOTE — TELEPHONE ENCOUNTER
Bettie-Please advise:  1. Are you able to view records from [facility] evaluation?   A. Care Everywhere not loading at time writer reviewed chart    Thank you!  DUNIA AlvarengaN, RN-BC  [facility]

## 2023-02-13 NOTE — TELEPHONE ENCOUNTER
New Medication Request        What medication are you requesting?: Fungal medication    Reason for medication request: Is having a rash breakout on body due to newer dx from [place]. She reached out to them and she was advised to contact PCP. She states we should be able to see all of her records from [place] within her chart.    Have you taken this medication before?: No    Controlled Substance Agreement on file:   CSA -- Patient Level:    CSA: None found at the patient level.         Patient offered an appointment? No    Preferred Pharmacy:  [id number] - [place], [place] - [address]  [address]  [place] [address]  Phone: [phone] Fax: [phone]      Could we send this information to you in [employer] or would you prefer to receive a phone call?:   Patient would prefer a phone call   Okay to leave a detailed message?: Yes at Home number on file [phone] (home)

## 2023-02-14 ENCOUNTER — MYC MEDICAL ADVICE (OUTPATIENT)
Dept: FAMILY MEDICINE | Facility: CLINIC | Age: 63
End: 2023-02-14

## 2023-02-14 ENCOUNTER — VIRTUAL VISIT (OUTPATIENT)
Dept: FAMILY MEDICINE | Facility: CLINIC | Age: 63
End: 2023-02-14
Payer: COMMERCIAL

## 2023-02-14 DIAGNOSIS — L50.9 HIVES: Primary | ICD-10-CM

## 2023-02-14 PROBLEM — K63.5 COLON POLYP: Status: ACTIVE | Noted: 2021-08-19

## 2023-02-14 PROBLEM — M21.372 FOOT DROP, LEFT: Status: ACTIVE | Noted: 2022-05-01

## 2023-02-14 PROBLEM — D12.0 BENIGN NEOPLASM OF CECUM: Status: ACTIVE | Noted: 2021-08-23

## 2023-02-14 PROBLEM — K57.30 DIVERTICULOSIS OF LARGE INTESTINE: Status: ACTIVE | Noted: 2019-05-20

## 2023-02-14 PROBLEM — K64.9 HEMORRHOIDS: Status: ACTIVE | Noted: 2019-04-10

## 2023-02-14 PROCEDURE — 99213 OFFICE O/P EST LOW 20 MIN: CPT | Mod: VID | Performed by: FAMILY MEDICINE

## 2023-02-14 NOTE — TELEPHONE ENCOUNTER
I cannot see any [facility] notes.  I'm not sure if that's due to her not signing a release or?  Can she ask them to fax an applicable note to me?

## 2023-02-14 NOTE — TELEPHONE ENCOUNTER
Called patient and reviewed message per PCP.    Patient verbalized understanding and stated [facility] communication was with a nurse.    Per patient:  1. Think has rash from adhesive used around stoma  2. Used an over-the-counter cream as advised by [facility] and usually it goes away and then returns  3. Very itchy    Recommended video visit today with provider and to please send [employer] unlimited message with pictures attached.  Writer will watch for these pictures and forward to provider.    Video visit scheduled with Dr. Evans this afternoon at 1340.  Visit date and time confirmed with patient.    DUNIA AlvarengaN, RN-BC  [facility]

## 2023-02-14 NOTE — PROGRESS NOTES
Maeve is a 62 year old who is being evaluated via a billable video visit.      Assessment & Plan     (L50.9) Hives  (primary encounter diagnosis)  Comment: new problem  Plan: home cares, Benedryl at night  Has follow up with [place] next week with stoma nurse, can recheck then if rash persists    No follow-ups on file.    Becki Evans MD  [facility]    Neli Mcfarlane is a 62 year old, presenting for the following health issues:  Several rashes of red, discrete bumps have developed since she started using a new skin barrier, and she's been sweating a lot more recently. She does use desonex to help prevent heat/sweat rashes. She uses a stoma powder as well. She has rash on both breasts and stomach, including around stoma. Some have formed pustules that formed a little pus. Rash on stomach started as welt, is intensely itchy.    She tried benedryl cream but it didn't help, hasn't tried orally.       No chief complaint on file.      History of Present Illness       Reason for visit:  Skin rash  Symptom onset:  1-2 weeks ago  Symptoms include:  Red dots on torso. Hive on left side.  Symptom intensity:  Moderate  Symptom progression:  Staying the same  Had these symptoms before:  Yes  Has tried/received treatment for these symptoms:  Yes  Previous treatment was successful:  No  What makes it worse:  No  What makes it better:  Desenex worked ok in the past. This is worse and not responding    She eats 4 or more servings of fruits and vegetables daily.She consumes 0 sweetened beverage(s) daily.She exercises with enough effort to increase her heart rate 20 to 29 minutes per day.  She exercises with enough effort to increase her heart rate 4 days per week.   She is taking medications regularly.       Review of Systems   Constitutional, HEENT, cardiovascular, pulmonary, GI, , musculoskeletal, neuro, skin, endocrine and psych systems are negative, except as otherwise noted.       Objective           Vitals:  No vitals were obtained today due to virtual visit.    Physical Exam   GENERAL: Healthy, alert and no distress  EYES: Eyes grossly normal to inspection.  No discharge or erythema, or obvious scleral/conjunctival abnormalities.  RESP: No audible wheeze, cough, or visible cyanosis.  No visible retractions or increased work of breathing.    SKIN: Visible skin clear. No significant rash, abnormal pigmentation or lesions.  NEURO: Cranial nerves grossly intact.  Mentation and speech appropriate for age.  PSYCH: Mentation appears normal, affect normal/bright, judgement and insight intact, normal speech and appearance well-groomed.          Video-Visit Details    Type of service:  Video Visit     Originating Location (pt. Location): Home  Distant Location (provider location):  Off-site  Platform used for Video Visit: WITOI

## 2023-02-20 ENCOUNTER — MYC MEDICAL ADVICE (OUTPATIENT)
Dept: FAMILY MEDICINE | Facility: CLINIC | Age: 63
End: 2023-02-20
Payer: COMMERCIAL

## 2023-02-20 ENCOUNTER — TELEPHONE (OUTPATIENT)
Dept: FAMILY MEDICINE | Facility: CLINIC | Age: 63
End: 2023-02-20
Payer: COMMERCIAL

## 2023-02-20 NOTE — TELEPHONE ENCOUNTER
Patient given message from provider as below.  Comfort measures from Clinical References in Epic suggest cool packs or cool bath to decrease symptoms , calamine lotion and Benadryl.  Patient was advised to use good hand washing if touching the blisters.  Should call if she develops rash near her eyes.  Call if develops fever, rash is worsening or not resolving.  Per CDC rash can last up to 5 weeks.  Elzbieta Montez RN  [facility]

## 2023-02-20 NOTE — TELEPHONE ENCOUNTER
Since the vaccine is 90% (and that is weeks after the second dose), it is possible she has developed shingles.  It is rare to have it on both sides of the body, but possible when someone has an impaired immune system (she is on Enbrel).  However, she is beyond the time where treating with an antiviral would be helpful.  If it is shingles, the rash usually resolves in a few weeks or so.

## 2023-02-20 NOTE — TELEPHONE ENCOUNTER
Patient would like you to see the current pictures of rash.  Elzbieta Montez RN  [facility]

## 2023-02-20 NOTE — TELEPHONE ENCOUNTER
Reason for call:  Patient reporting a symptom    Symptom or request: mild case     Duration (how long have symptoms been present): About two weeks now    Have you been treated for this before? No    Additional comments: Thinks she has shingles. Video chat last week and they thought it was an allergic reaction, but the benedryl isn't helping and it's gotten worse. More and more areas that are infected and online search shows similar images of what she has. Spreading.     Phone Number patient can be reached at:  Home number on file [phone] (home)    Best Time:  ASAP/Anytime    Can we leave a detailed message on this number:  YES    Call taken on 2/20/2023 at 7:45 AM by Patricia Yu

## 2023-02-20 NOTE — TELEPHONE ENCOUNTER
Patient states she did a virtual appointment last Thursday with Dr Evans regarding a rash on her chest.  Rash seems to be getting worse and patient believes it may be shingles.  Did do 3 days of Valtrex for a cold sore but has never had reaction to this in the past.    States rash looks like little clisters to her and is on both breasts and below her appliance (stoma).  Patient did try a new Barrier Cream around stoma last week.  Has tried oral Benadryl and it helped a little, allowed her to fall asleep the other day.  Has tried Calamine lotion with only very limited relief.  Desonex has been good at clearing up a rash she gets below her appliance but hasn't cleared this.    Patient is asking if this might be related to getting the 2nd dose of Shingles vaccine and the COVID vaccine on 1/6/23.  I would have expected to appear sooner if this was the case though due to incubation period on Chickenpox.  Also rash does not stop in center but is on both breasts.    Patient will try to send updated pictures today after she is done changing her appliance.  Elzbieta Montez RN  [facility]

## 2023-02-22 ENCOUNTER — OFFICE VISIT (OUTPATIENT)
Dept: URGENT CARE | Facility: URGENT CARE | Age: 63
End: 2023-02-22
Payer: COMMERCIAL

## 2023-02-22 VITALS
DIASTOLIC BLOOD PRESSURE: 76 MMHG | RESPIRATION RATE: 16 BRPM | OXYGEN SATURATION: 97 % | HEART RATE: 89 BPM | SYSTOLIC BLOOD PRESSURE: 147 MMHG | TEMPERATURE: 97.7 F | BODY MASS INDEX: 32.82 KG/M2 | WEIGHT: 165 LBS

## 2023-02-22 DIAGNOSIS — K51.00 ULCERATIVE PANCOLITIS WITHOUT COMPLICATION (H): ICD-10-CM

## 2023-02-22 DIAGNOSIS — R21 RASH: Primary | ICD-10-CM

## 2023-02-22 DIAGNOSIS — B35.4 TINEA CORPORIS: Primary | ICD-10-CM

## 2023-02-22 DIAGNOSIS — Z93.2 ILEOSTOMY STATUS (H): ICD-10-CM

## 2023-02-22 LAB
KOH PREPARATION: ABNORMAL
KOH PREPARATION: ABNORMAL

## 2023-02-22 PROCEDURE — 87102 FUNGUS ISOLATION CULTURE: CPT | Performed by: NURSE PRACTITIONER

## 2023-02-22 PROCEDURE — 99214 OFFICE O/P EST MOD 30 MIN: CPT | Performed by: NURSE PRACTITIONER

## 2023-02-22 PROCEDURE — 87220 TISSUE EXAM FOR FUNGI: CPT | Performed by: NURSE PRACTITIONER

## 2023-02-22 PROCEDURE — 87070 CULTURE OTHR SPECIMN AEROBIC: CPT | Performed by: NURSE PRACTITIONER

## 2023-02-22 PROCEDURE — 87798 DETECT AGENT NOS DNA AMP: CPT | Performed by: NURSE PRACTITIONER

## 2023-02-22 RX ORDER — FLUCONAZOLE 150 MG/1
150 TABLET ORAL WEEKLY
Qty: 2 TABLET | Refills: 0 | Status: SHIPPED | OUTPATIENT
Start: 2023-02-22 | End: 2024-04-04

## 2023-02-22 NOTE — PROGRESS NOTES
Chief Complaint   Patient presents with     Urgent Care     Rash     Second visit of e-visit last week, Pt has stoma, try on new product cleaning it x4 days ago. Then breaking out with a rash and then on breast too.      SUBJECTIVE:  Maeve Lovell is a 62 year old female who presents to the clinic today with a rash to her bilateral breast that occurred 2-1/2 weeks ago.  She says she is getting new lesions onto the left breast.  There is slight itching neuropathic tingling.  It started as red bumps and several of them looked slightly pimple-like.  Patient is immunocompromised with ulcerative colitis and has a stoma.  She used a new topical product 3 weeks ago for fungus.  She would like skin swabs done to determine the etiology of this rash.  No fever sweats chills nausea vomiting.  She had COVID shingles vaccines done several weeks ago.  Took Valtrex for cold sore recently.    Past Medical History:   Diagnosis Date     Lichen sclerosus of female genitalia      RA (rheumatoid arthritis) (H)      Ulcerative colitis (H)      clobetasol (TEMOVATE) 0.05 % external ointment, Use a pea sized amount one to two times weekly as needed  Indications: Inflammation, lichen sclerosus  etanercept (ENBREL SURECLICK) 50 MG/ML autoinjector, Inject 50 mg Subcutaneous every 7 days  ONE DAILY MULTIPLE VITAMIN OR,   predniSONE (DELTASONE) 20 MG tablet,   valACYclovir (VALTREX) 500 MG tablet, Take 500 mg by mouth 2 times daily    No current facility-administered medications on file prior to visit.    Social History     Tobacco Use     Smoking status: Never     Smokeless tobacco: Never   Substance Use Topics     Alcohol use: Not Currently     Allergies   Allergen Reactions     Diagnostic X[employer] Hives     angioneuritic edema of face and throat     Alcohol Hives     Ethanol        Review of Systems   All systems negative except for those listed above in HPI.    EXAM:   BP (!) 147/76   Pulse 89   Temp 97.7  F (36.5  C)  (Temporal)   Resp 16   Wt 74.8 kg (165 lb)   SpO2 97%   BMI 32.82 kg/m      Physical Exam  Vitals reviewed.   Constitutional:       Appearance: Normal appearance.   HENT:      Head: Normocephalic and atraumatic.   Cardiovascular:      Rate and Rhythm: Normal rate.   Pulmonary:      Effort: Pulmonary effort is normal.   Musculoskeletal:         General: Normal range of motion.   Skin:     General: Skin is warm and dry.      Findings: Erythema and rash present.      Comments: Rash to bilateral breasts with scattered erythematous dry concaved lesions.   Neurological:      General: No focal deficit present.      Mental Status: She is alert and oriented to person, place, and time.   Psychiatric:         Mood and Affect: Mood normal.         Behavior: Behavior normal.       ASSESSMENT:    ICD-10-CM    1. Rash  R21 Varicella Zoster DNA PCR CSF or Skin Swab     Swab Aerobic Bacterial Culture Routine     Fungus Culture,  skin, hair, or nail     KOH prep (skin, hair or nails only)      2. Ulcerative pancolitis without complication (H)  K51.00       3. Ileostomy status (H)  Z93.2           PLAN:    Procedure:The area was first scraped with an 11 blade for fungal culture.  The sores were opened and then swabbed for shingles and bacteria.    Would consider shingles given patient is immunocompromised and had the COVID vaccine right before lesions started  The rash came with neuropathic tingling which is likely shingles  Valtrex is not indicated 3 weeks out but could be helpful to know the etiology of the rash  Discussed that skin swabs may not be accurate 3 weeks out but patient prefers that we at least try  Patient understands to check MyChart in 2 to 3 days for culture results otherwise we would call for positive Slatter not seen  For now continue Zyrtec Benadryl for itch could try hydrocortisone topically  Hypoallergenic soaps detergents  Follow-up with PCP if this lingers or worsens    Follow up with primary care provider  with any problems, questions or concerns or if symptoms worsen or fail to improve. Patient agreed to plan and verbalized understanding.    DEYANIRA Castillo-BC  [facility]

## 2023-02-23 LAB — VZV DNA SPEC QL NAA+PROBE: NOT DETECTED

## 2023-02-24 LAB — BACTERIA WND CULT: NO GROWTH

## 2023-03-05 ENCOUNTER — MYC MEDICAL ADVICE (OUTPATIENT)
Dept: FAMILY MEDICINE | Facility: CLINIC | Age: 63
End: 2023-03-05
Payer: COMMERCIAL

## 2023-03-05 ENCOUNTER — NURSE TRIAGE (OUTPATIENT)
Dept: NURSING | Facility: CLINIC | Age: 63
End: 2023-03-05
Payer: COMMERCIAL

## 2023-03-05 DIAGNOSIS — R21 RASH: Primary | ICD-10-CM

## 2023-03-05 DIAGNOSIS — B35.4 TINEA CORPORIS: ICD-10-CM

## 2023-03-05 NOTE — TELEPHONE ENCOUNTER
Patient calling. She states she's been seen twice already for a rash on her chest and torso. Initially a video visit, and it was thought to be an allergic reaction to stoma adhesive. She was then seen around a week ago in clinic. She had lab work done at that time. One of them was positive for fungus. She took a second dose of fluconazole this morning, and she's getting more areas of rash. Wants to know if there's a topical agent that she can apply. Has been trying calamine lotion, which stops the itching, but doesn't stop the spread of the patches of rash. She has also tried miconazole. Patient also has an autoimmune disorder. She is also concerned that she may have shingles. States that around her stoma, she has had yeast under the appliance since last November. She was given nystatin powder for around her stoma only when she was last seen at [facility]. Her soap and shampoo are fragrance-free and organic.    Care advice given for patient to be seen in clinic within 3 days. Patient has already been seen for this rash, and states she will message her primary provider for further advice. Patient discussed trying clotrimazole on the areas of rash which are not around her stoma. Discussed with patient possible new medications she started around the time that the rash first appeared, and she stated that she had started using Tylenol Arthritis formula (sustained release) at the same time. Discussed that since she had previously used immediate release acetaminophen, that perhaps she should switch back to regular acetaminophen. Patient agrees with care instructions.    Kia Salazar RN  [facility]  March 5, 2023, 8:07 AM    Reason for Disposition    Taking new non-prescription (OTC) antihistamine, decongestant, ear drops, eye drops, or other OTC cough/cold medicine    Mild widespread rash    Additional Information    Negative: [1] Life-threatening reaction (anaphylaxis) in the past to similar substance (e.g., food,  insect bite/sting, chemical, etc.) AND [2] < 2 hours since exposure    Negative: [1] Sudden onset of rash (within last 2 hours) AND [2] difficulty breathing or swallowing    Negative: Shock suspected (e.g., cold/pale/clammy skin, too weak to stand, low BP, rapid pulse)    Negative: Difficult to awaken or acting confused (e.g., disoriented, slurred speech)    Negative: [1] Purple or blood-colored spots or dots AND [2] fever    Negative: Sounds like a life-threatening emergency to the triager    Negative: Insect bites suspected    Negative: Swimmer's Itch suspected    Negative: Sunburn suspected    Negative: Hives suspected    Negative: Measles suspected AND [2] known exposure to measles in past 3 weeks    Negative: [1] Chickenpox suspected AND [2] known exposure to chickenpox in past 3 weeks    Negative: [1] Life-threatening reaction (anaphylaxis) in the past to the same drug AND [2] < 2 hours since exposure    Negative: Difficulty breathing or wheezing    Negative: [1] Hoarseness or cough AND [2] started soon after 1st dose of drug    Negative: [1] Swollen tongue AND [2] started soon after 1st dose of drug    Negative: [1] Purple or blood-colored rash (spots or dots) AND [2] fever    Negative: Sounds like a life-threatening emergency to the triager    Negative: Rash is only on 1 part of the body (localized)    Negative: [1] Drug rash suspected AND [2] started taking new medicine within last 2 weeks (Exception: antihistamine, eye drops, ear drops, decongestant or other OTC cough/cold medicines)    Negative: [1] Bright red, sunburn-like rash AND [2] current tampon use or nasal packing    Negative: [1] Bright red, sunburn-like rash AND [3] wound infection or recent surgery    Negative: [1] Bright red skin AND [2] peels off in sheets    Negative: Stiff neck (can't touch chin to chest)    Negative: Fever    Negative: Joint pain or swelling    Negative: Patient sounds very sick or weak to the triager    Negative: [1]  "Purple or blood-colored rash (spots or dots) AND [2] no fever AND [3] sounds well to triager    Negative: Large or small blisters on skin (i.e., fluid filled bubbles or sacs)    Negative: Bloody crusts on lips or sores in mouth    Negative: Face becomes swollen    Negative: [1] Headache AND [2] no fever    Negative: SEVERE itching (i.e., interferes with sleep, normal activities or school)    Negative: Sore throat    Negative: Ring-like appearance of rash (or ask: does it look like a  \"target\" or \"bulls- eye\")    Negative: Pregnant    Protocols used: RASH - WIDESPREAD ON DRUGS-A-AH, RASH OR REDNESS - WIDESPREAD-A-AH      "

## 2023-03-07 RX ORDER — BENZOCAINE/MENTHOL 6 MG-10 MG
LOZENGE MUCOUS MEMBRANE 2 TIMES DAILY
Qty: 120 G | Refills: 1 | Status: CANCELLED | OUTPATIENT
Start: 2023-03-07

## 2023-03-07 RX ORDER — CLOTRIMAZOLE 1 %
CREAM (GRAM) TOPICAL 2 TIMES DAILY
Qty: 113 G | Refills: 3 | Status: CANCELLED | OUTPATIENT
Start: 2023-03-07

## 2023-03-07 RX ORDER — KETOCONAZOLE 20 MG/G
CREAM TOPICAL 2 TIMES DAILY
Qty: 120 G | Refills: 1 | Status: SHIPPED | OUTPATIENT
Start: 2023-03-07 | End: 2024-07-29

## 2023-03-07 RX ORDER — FLUCONAZOLE 200 MG/1
200 TABLET ORAL WEEKLY
Qty: 4 TABLET | Refills: 0 | Status: SHIPPED | OUTPATIENT
Start: 2023-03-07 | End: 2024-04-04

## 2023-03-07 NOTE — TELEPHONE ENCOUNTER
Since the KOH was positive for fungal elements, let's try Fluconazole 200 mg weekly x 4 weeks.  Derm referral signed.

## 2023-03-07 NOTE — TELEPHONE ENCOUNTER
I called patient and let her know    She is grateful     [facility].    KAREN Hurtado RN  [facility]

## 2023-03-07 NOTE — TELEPHONE ENCOUNTER
"I called patient    She is concerned because the two weeks she was on it did not help her.    She is frustrated     Says she will do the 4 week course of fluconazole, but needs something topical until she can get in with derm otherwise she \"will end up in a psych lowe\"    Can something topical be sent in a large quantity?     If not able to send something in, she wonders what should do if it itches? She says \"this is miserable\"    KAREN Hurtado RN  [facility]    "

## 2023-03-07 NOTE — TELEPHONE ENCOUNTER
Patient calling in about this    There are 3 messages regarding this situation, please review these first    She has also been seen by Dr. Evans and Urgent Care for this rash.    Patient did  a tube of clotrimazole which was advised by a nurse she talked to on the phone over the weekend, this was $15 and she has used the whole tube already. She says this is helping a bit, but she says she needs a prescription for this if going to be using this for 2-3 weeks.     Rash is all over both breasts, torso, spreading to her back, arms, on both sides of her torso.    She also wants to mention she has a temporary stoma that she has been getting a red spot under, she is using Nystop for this (from [place])    She says she has been taking tylenol arthritis right around when rash started which she wonders if could be causing her rash. She read on the Internet that this medication can cause rash.    --Last dose was Saturday 3/4/23.    She says ever since then, she has been slowly getting better, but it seems like its spreading as its on her back now too.     The areas where the rash is located are very large, she needs a lot of the clotrimazole cream to cover it. Pended this if PCP can order to get her by until able to see derm. Also pended hydrocortisone to help with itching.    Pended urgent [facility] referral [facility]      KAREN Hurtado RN  [facility]

## 2023-03-09 ENCOUNTER — TRANSFERRED RECORDS (OUTPATIENT)
Dept: HEALTH INFORMATION MANAGEMENT | Facility: CLINIC | Age: 63
End: 2023-03-09

## 2023-03-22 LAB — BACTERIA WND CULT: NO GROWTH

## 2023-04-06 ENCOUNTER — TELEPHONE (OUTPATIENT)
Dept: FAMILY MEDICINE | Facility: CLINIC | Age: 63
End: 2023-04-06
Payer: COMMERCIAL

## 2023-04-06 DIAGNOSIS — M25.561 RIGHT KNEE PAIN, UNSPECIFIED CHRONICITY: Primary | ICD-10-CM

## 2023-04-06 NOTE — TELEPHONE ENCOUNTER
Bettie-Please review and sign if agree.    Call received from patient:  1. Needed cortisone shot in right knee-got that done at [facility] earlier this year-January  2. Wants to see Dr. Fuentes in [place] who is with [facility] for a cortisone injection in right knee and hoping to schedule this before she has major surgery on 5/3/23   A. Her insurance does not require a referral; wonders if PCP can order referral as unsure if Orthopedics will require a referral for scheduling    Informed patient:  1. She is welcome to call Orthopedic scheduling to see if she can schedule visit with Dr. Fuentes without referral  2. Writer will ask PCP to order Orthopedic referral and can expect a response on 4/10/23 as PCP does not see patients on Thursdays nor Fridays    Patient verbalized understanding and in agreement with plan.    Thank you!  DUNIA AlvarengaN, RN-BC  [facility]

## 2023-04-07 ENCOUNTER — TELEPHONE (OUTPATIENT)
Dept: ORTHOPEDICS | Facility: CLINIC | Age: 63
End: 2023-04-07
Payer: COMMERCIAL

## 2023-04-07 NOTE — TELEPHONE ENCOUNTER
Outside records received via fax from [facility] and placed in provider basket at [facility] in preparation for patient's upcoming appointment with Dr. Yeo on 4/10/23. Per records patient had XR taken at [facility]. Writer called [facility] and had images pushed to PACs and XR report faxed.     Tamra Silverio MSA, ATC  Certified Athletic Trainer

## 2023-04-10 ENCOUNTER — ANCILLARY ORDERS (OUTPATIENT)
Dept: FAMILY MEDICINE | Facility: CLINIC | Age: 63
End: 2023-04-10

## 2023-04-10 ENCOUNTER — OFFICE VISIT (OUTPATIENT)
Dept: ORTHOPEDICS | Facility: CLINIC | Age: 63
End: 2023-04-10
Payer: COMMERCIAL

## 2023-04-10 ENCOUNTER — ANCILLARY ORDERS (OUTPATIENT)
Dept: MAMMOGRAPHY | Facility: CLINIC | Age: 63
End: 2023-04-10

## 2023-04-10 VITALS
BODY MASS INDEX: 37.26 KG/M2 | HEIGHT: 60 IN | WEIGHT: 189.8 LBS | DIASTOLIC BLOOD PRESSURE: 79 MMHG | SYSTOLIC BLOOD PRESSURE: 138 MMHG

## 2023-04-10 DIAGNOSIS — M05.79 RHEUMATOID ARTHRITIS INVOLVING MULTIPLE SITES WITH POSITIVE RHEUMATOID FACTOR (H): ICD-10-CM

## 2023-04-10 DIAGNOSIS — M25.561 RIGHT KNEE PAIN, UNSPECIFIED CHRONICITY: ICD-10-CM

## 2023-04-10 DIAGNOSIS — M17.11 PRIMARY OSTEOARTHRITIS OF RIGHT KNEE: Primary | ICD-10-CM

## 2023-04-10 DIAGNOSIS — Z12.31 VISIT FOR SCREENING MAMMOGRAM: ICD-10-CM

## 2023-04-10 DIAGNOSIS — E66.01 MORBID OBESITY (H): ICD-10-CM

## 2023-04-10 LAB
APPEARANCE FLD: ABNORMAL
CELL COUNT BODY FLUID SOURCE: ABNORMAL
COLOR FLD: YELLOW
CRYSTALS SNV MICRO: NORMAL
LYMPHOCYTES NFR FLD MANUAL: 24 %
MONOS+MACROS NFR FLD MANUAL: 14 %
NEUTS BAND NFR FLD MANUAL: 62 %
WBC # FLD AUTO: 8008 /UL

## 2023-04-10 PROCEDURE — 89051 BODY FLUID CELL COUNT: CPT | Performed by: FAMILY MEDICINE

## 2023-04-10 PROCEDURE — 89060 EXAM SYNOVIAL FLUID CRYSTALS: CPT | Performed by: FAMILY MEDICINE

## 2023-04-10 PROCEDURE — 87070 CULTURE OTHR SPECIMN AEROBIC: CPT | Performed by: FAMILY MEDICINE

## 2023-04-10 PROCEDURE — 99204 OFFICE O/P NEW MOD 45 MIN: CPT | Mod: 25 | Performed by: FAMILY MEDICINE

## 2023-04-10 PROCEDURE — 20611 DRAIN/INJ JOINT/BURSA W/US: CPT | Mod: RT | Performed by: FAMILY MEDICINE

## 2023-04-10 PROCEDURE — 87205 SMEAR GRAM STAIN: CPT | Performed by: FAMILY MEDICINE

## 2023-04-10 RX ORDER — METHYLPREDNISOLONE ACETATE 40 MG/ML
40 INJECTION, SUSPENSION INTRA-ARTICULAR; INTRALESIONAL; INTRAMUSCULAR; SOFT TISSUE
Status: DISCONTINUED | OUTPATIENT
Start: 2023-04-10 | End: 2023-07-10

## 2023-04-10 RX ADMIN — METHYLPREDNISOLONE ACETATE 40 MG: 40 INJECTION, SUSPENSION INTRA-ARTICULAR; INTRALESIONAL; INTRAMUSCULAR; SOFT TISSUE at 11:21

## 2023-04-10 NOTE — PROGRESS NOTES
ASSESSMENT & PLAN  Patient Instructions     1. Primary osteoarthritis of right knee    2. Rheumatoid arthritis involving multiple sites with positive rheumatoid factor (H)    3. Morbid obesity (H)      -Patient has chronic right knee pain and swelling due to arthritis, and possibly due to Rheumatoid Arthritis  -Patient tolerated aspiration and cortisone injection today without complications.  Patient was given postprocedure instructions.  We will also send off the joint fluid fluid for analysis of crystals, infection and inflammation.  Patient has history of rheumatoid arthritis and so we can assess if this is affecting her knee pain and swelling as well.  -Patient will start formal physical therapy and home exercise program to strengthen and stabilize her leg and knee  -We also discussed slow sustainable weight loss to slow down progression of arthritis and improve pain and function  -Call direct clinic number [[phone]] at any time with questions or concerns.    Albert Yeo MD [facility]  [facility]          -----    SUBJECTIVE  Maeve Lovell is a/an 62 year old female who is seen in consultation at the request of  Kimberly Childress N.P. for evaluation of right knee pain. The patient is seen by themselves.    History of RA. States in January her rheumatologist was going to do a cortisone injection but wasn't able to get her in. She went to [facility] in January and they did an aspiration and injection. She notes 25cc were aspirated.    Onset: 1 years(s) ago. Reports insidious onset without acute precipitating event.  Location of Pain: right knee  Rating of Pain at worst: 9/10  Rating of Pain Currently: 7/10  Worsened by: stairs, kneeling, walking  Better with: prednisone   Treatments tried: heat, rest and corticosteroid injection (most recent date reported by patient: 1/7/23) that provided  2 month(s) of relief  Associated symptoms: pain, swelling, episodes of  instability with walking  Orthopedic history: NO  Relevant surgical history: NO  Social history: social history: retired    Past Medical History:   Diagnosis Date     Lichen sclerosus of female genitalia      RA (rheumatoid arthritis) (H)      Ulcerative colitis (H)      Social History     Socioeconomic History     Marital status: Single   Tobacco Use     Smoking status: Never     Smokeless tobacco: Never   Vaping Use     Vaping status: Never Used   Substance and Sexual Activity     Alcohol use: Not Currently     Drug use: Not Currently     Sexual activity: Not Currently         Patient's past medical, surgical, social, and family histories were reviewed today and no changes are noted.    REVIEW OF SYSTEMS:  10 point ROS is negative other than symptoms noted above in HPI, Past Medical History or as stated below  Constitutional: NEGATIVE for fever, chills, change in weight  Skin: NEGATIVE for worrisome rashes, moles or lesions  GI/: NEGATIVE for bowel or bladder changes  Neuro: NEGATIVE for weakness, dizziness or paresthesias    OBJECTIVE:  /79   Ht 1.524 m (5')   Wt 86.1 kg (189 lb 12.8 oz)   BMI 37.07 kg/m     General: healthy, alert and in no distress  HEENT: no scleral icterus or conjunctival erythema  Skin: no suspicious lesions or rash. No jaundice.  CV: no pedal edema  Resp: normal respiratory effort without conversational dyspnea   Psych: normal mood and affect  Gait: normal steady gait with appropriate coordination and balance  Neuro: Normal light sensory exam of lower extremity  MSK:  RIGHT KNEE  Inspection:    normal alignment  Palpation:    Tender about the lateral joint line and medial joint line. Remainder of bony and ligamentous landmarks are nontender.    Mild effusion is present    Patellofemoral crepitus is Absent  Range of Motion:     00 extension to 1100 flexion  Strength:    Quadriceps grossly intact    Extensor mechanism intact  Special Tests:    Positive: none    Negative: MCL/valgus  stress (0 & 30 deg), LCL/varus stress (0 & 30 deg), Lachman's, anterior drawer, posterior drawer, Delbert's    Independent visualization of the below image:  No results found for this or any previous visit (from the past 24 hour(s)).      X-ray of the right knee performed at [facility] radiology on 1/10/2023 shows moderate arthritis of the medial compartment, mild arthritis of the patellofemoral compartment.  Small knee joint effusion.    Large Joint Injection/Arthocentesis: R knee joint    Date/Time: 4/10/2023 11:21 AM    Performed by: Yeo, Albert, MD  Authorized by: Yeo, Albert, MD    Indications:  Pain and osteoarthritis  Needle Size:  25 G  Guidance: ultrasound    Approach:  Superolateral  Location:  Knee      Medications:  40 mg methylPREDNISolone 40 MG/ML  Aspirate amount (mL):  18  Aspirate:  Yellow and cloudy  Aspirate analysis: sent for lab analysis    Outcome:  Tolerated well, no immediate complications  Procedure discussed: discussed risks, benefits, and alternatives    Consent Given by:  Patient  Timeout: timeout called immediately prior to procedure    Prep: patient was prepped and draped in usual sterile fashion        Albert Yeo MD CAQSM  [facility]

## 2023-04-10 NOTE — TELEPHONE ENCOUNTER
Writer called patient and informed her of referral ordered by SUZETTE Childress CNP.    Patient verbalized understanding and in agreement with plan.    KAREN Alvarenga, RN-BC  [facility]

## 2023-04-10 NOTE — PATIENT INSTRUCTIONS
1. Primary osteoarthritis of right knee    2. Rheumatoid arthritis involving multiple sites with positive rheumatoid factor (H)    3. Morbid obesity (H)      -Patient has chronic right knee pain and swelling due to arthritis, and possibly due to Rheumatoid Arthritis  -Patient tolerated aspiration and cortisone injection today without complications.  Patient was given postprocedure instructions.  We will also send off the joint fluid fluid for analysis of crystals, infection and inflammation.  Patient has history of rheumatoid arthritis and so we can assess if this is affecting her knee pain and swelling as well.  -Patient will start formal physical therapy and home exercise program to strengthen and stabilize her leg and knee  -We also discussed slow sustainable weight loss to slow down progression of arthritis and improve pain and function  -Call direct clinic number [[phone]] at any time with questions or concerns.    Albert Yeo MD [facility]  [facility]

## 2023-04-10 NOTE — LETTER
4/10/2023         RE: Maeve Lovell  [address]  [place] [address]        Dear Colleague,    Thank you for referring your patient, Maeve Lovell, to the [facility] [place]. Please see a copy of my visit note below.    ASSESSMENT & PLAN  Patient Instructions     1. Primary osteoarthritis of right knee    2. Rheumatoid arthritis involving multiple sites with positive rheumatoid factor (H)    3. Morbid obesity (H)      -Patient has chronic right knee pain and swelling due to arthritis, and possibly due to Rheumatoid Arthritis  -Patient tolerated aspiration and cortisone injection today without complications.  Patient was given postprocedure instructions.  We will also send off the joint fluid fluid for analysis of crystals, infection and inflammation.  Patient has history of rheumatoid arthritis and so we can assess if this is affecting her knee pain and swelling as well.  -Patient will start formal physical therapy and home exercise program to strengthen and stabilize her leg and knee  -We also discussed slow sustainable weight loss to slow down progression of arthritis and improve pain and function  -Call direct clinic number [[phone]] at any time with questions or concerns.    Albert Yeo MD [facility]  [facility]          -----    SUBJECTIVE  Maeve Lovell is a/an 62 year old female who is seen in consultation at the request of  Kimberly Childress N.P. for evaluation of right knee pain. The patient is seen by themselves.    History of RA. States in January her rheumatologist was going to do a cortisone injection but wasn't able to get her in. She went to [facility] in January and they did an aspiration and injection. She notes 25cc were aspirated.    Onset: 1 years(s) ago. Reports insidious onset without acute precipitating event.  Location of Pain: right knee  Rating of Pain at worst: 9/10  Rating of Pain  Currently: 7/10  Worsened by: stairs, kneeling, walking  Better with: prednisone   Treatments tried: heat, rest and corticosteroid injection (most recent date reported by patient: 1/7/23) that provided  2 month(s) of relief  Associated symptoms: pain, swelling, episodes of instability with walking  Orthopedic history: NO  Relevant surgical history: NO  Social history: social history: retired    Past Medical History:   Diagnosis Date     Lichen sclerosus of female genitalia      RA (rheumatoid arthritis) (H)      Ulcerative colitis (H)      Social History     Socioeconomic History     Marital status: Single   Tobacco Use     Smoking status: Never     Smokeless tobacco: Never   Vaping Use     Vaping status: Never Used   Substance and Sexual Activity     Alcohol use: Not Currently     Drug use: Not Currently     Sexual activity: Not Currently         Patient's past medical, surgical, social, and family histories were reviewed today and no changes are noted.    REVIEW OF SYSTEMS:  10 point ROS is negative other than symptoms noted above in HPI, Past Medical History or as stated below  Constitutional: NEGATIVE for fever, chills, change in weight  Skin: NEGATIVE for worrisome rashes, moles or lesions  GI/: NEGATIVE for bowel or bladder changes  Neuro: NEGATIVE for weakness, dizziness or paresthesias    OBJECTIVE:  /79   Ht 1.524 m (5')   Wt 86.1 kg (189 lb 12.8 oz)   BMI 37.07 kg/m     General: healthy, alert and in no distress  HEENT: no scleral icterus or conjunctival erythema  Skin: no suspicious lesions or rash. No jaundice.  CV: no pedal edema  Resp: normal respiratory effort without conversational dyspnea   Psych: normal mood and affect  Gait: normal steady gait with appropriate coordination and balance  Neuro: Normal light sensory exam of lower extremity  MSK:  RIGHT KNEE  Inspection:    normal alignment  Palpation:    Tender about the lateral joint line and medial joint line. Remainder of bony and  ligamentous landmarks are nontender.    Mild effusion is present    Patellofemoral crepitus is Absent  Range of Motion:     00 extension to 1100 flexion  Strength:    Quadriceps grossly intact    Extensor mechanism intact  Special Tests:    Positive: none    Negative: MCL/valgus stress (0 & 30 deg), LCL/varus stress (0 & 30 deg), Lachman's, anterior drawer, posterior drawer, Delbert's    Independent visualization of the below image:  No results found for this or any previous visit (from the past 24 hour(s)).      X-ray of the right knee performed at [facility] radiology on 1/10/2023 shows moderate arthritis of the medial compartment, mild arthritis of the patellofemoral compartment.  Small knee joint effusion.    Large Joint Injection/Arthocentesis    Date/Time: 4/10/2023 11:21 AM    Performed by: Yeo, Albert, MD  Authorized by: Yeo, Albert, MD    Indications:  Pain and osteoarthritis  Needle Size:  25 G  Guidance: ultrasound    Approach:  Superolateral  Location:  Knee      Medications:  40 mg methylPREDNISolone 40 MG/ML  Aspirate amount (mL):  18  Aspirate:  Yellow and cloudy  Aspirate analysis: sent for lab analysis    Outcome:  Tolerated well, no immediate complications  Procedure discussed: discussed risks, benefits, and alternatives    Consent Given by:  Patient  Timeout: timeout called immediately prior to procedure    Prep: patient was prepped and draped in usual sterile fashion        Albert Yeo MD [facility]      Again, thank you for allowing me to participate in the care of your patient.        Sincerely,        Albert Yeo, MD

## 2023-04-11 ENCOUNTER — ANCILLARY PROCEDURE (OUTPATIENT)
Dept: MAMMOGRAPHY | Facility: CLINIC | Age: 63
End: 2023-04-11
Payer: COMMERCIAL

## 2023-04-11 DIAGNOSIS — Z12.31 VISIT FOR SCREENING MAMMOGRAM: ICD-10-CM

## 2023-04-11 PROCEDURE — 77063 BREAST TOMOSYNTHESIS BI: CPT | Mod: TC | Performed by: RADIOLOGY

## 2023-04-11 PROCEDURE — 77067 SCR MAMMO BI INCL CAD: CPT | Mod: TC | Performed by: RADIOLOGY

## 2023-04-12 ENCOUNTER — MYC MEDICAL ADVICE (OUTPATIENT)
Dept: ORTHOPEDICS | Facility: CLINIC | Age: 63
End: 2023-04-12
Payer: COMMERCIAL

## 2023-04-12 DIAGNOSIS — M17.11 PRIMARY OSTEOARTHRITIS OF RIGHT KNEE: Primary | ICD-10-CM

## 2023-04-14 NOTE — TELEPHONE ENCOUNTER
I called patient to discuss her synovial fluid test results.  Patient has no crystals or infection.  Patient has an elevated cell count of 8000 which is indicative of inflammatory conditions.  Patient does have history of rheumatoid arthritis.  Patient is instructed to inform her rheumatologist for further treatment.

## 2023-04-15 LAB
BACTERIA SNV CULT: NO GROWTH
GRAM STAIN RESULT: NORMAL
GRAM STAIN RESULT: NORMAL

## 2023-04-18 ENCOUNTER — MYC MEDICAL ADVICE (OUTPATIENT)
Dept: FAMILY MEDICINE | Facility: CLINIC | Age: 63
End: 2023-04-18
Payer: COMMERCIAL

## 2023-04-18 ENCOUNTER — MYC MEDICAL ADVICE (OUTPATIENT)
Dept: ORTHOPEDICS | Facility: CLINIC | Age: 63
End: 2023-04-18
Payer: COMMERCIAL

## 2023-04-20 ENCOUNTER — MYC MEDICAL ADVICE (OUTPATIENT)
Dept: ORTHOPEDICS | Facility: CLINIC | Age: 63
End: 2023-04-20

## 2023-04-24 NOTE — TELEPHONE ENCOUNTER
The mammogram results from [facility] is accessible in care everywhere from 2/23/22    I copied and pasted the narrative below  02/24/2022 10:49 AM CST    : ACR BI-RADS Category 1: Negative    RECOMMENDATION: Follow Up Imaging in 12 months - Bilateral     The results and recommendations of this examination will be communicated   to the patient.       Imaging Results - MM Mammogram Screening Bilat W 3D Rupinder W CAD (02/23/2022 2:43 PM CST)  Narrative   02/24/2022 10:49 AM CST    MM MAMMOGRAM SCREENING BILAT W 3D RUPINDER W CAD performed on 2/23/22     Compared to: 11/02/2020 MM Mammogram Screening Bilat W 3D Rupinder W CAD,   05/21/2019 MM Mammogram Screening Bilat W 3D Rupinder W CAD, and 04/12/2018 MM   Mammogram Screening Bilat W Rupinder W CAD      FINDINGS: Bilateral screening mammogram was performed with the assistance   of Computer-Aided Detection and breast tomosynthesis. The breasts have   scattered areas of fibroglandular density.    There is no radiographic evidence of malignancy.               Suri Cantu, DUNIAN RN  [facility]

## 2023-04-25 ENCOUNTER — TRANSFERRED RECORDS (OUTPATIENT)
Dept: HEALTH INFORMATION MANAGEMENT | Facility: CLINIC | Age: 63
End: 2023-04-25
Payer: COMMERCIAL

## 2023-04-26 ENCOUNTER — ANCILLARY ORDERS (OUTPATIENT)
Dept: RADIOLOGY | Facility: CLINIC | Age: 63
End: 2023-04-26

## 2023-05-10 ENCOUNTER — TELEPHONE (OUTPATIENT)
Dept: WOUND CARE | Facility: CLINIC | Age: 63
End: 2023-05-10
Payer: COMMERCIAL

## 2023-05-10 ENCOUNTER — TELEPHONE (OUTPATIENT)
Dept: FAMILY MEDICINE | Facility: CLINIC | Age: 63
End: 2023-05-10
Payer: COMMERCIAL

## 2023-05-10 NOTE — TELEPHONE ENCOUNTER
Patient called wanting an IV due to dehydration.   Informed patient that an order would be needed for that.   Patient then stated she would go to the Urgency Room.     DUNIA CamarilloN BRAD  [facility]

## 2023-05-10 NOTE — TELEPHONE ENCOUNTER
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     Received a second call from patients friend Dominique stating the patient is at [facility] ER for dehydration and will receive pouching support there, no longer needs asap appointment.       Audrey WADE   1st: [employer], call \"Audrey Cesar\" or call Group \"[facility] Nurse\"   (2nd option: leave a voicemail at Dept. Office Number: [phone]. Messages checked occasionally M-F)    "

## 2023-05-30 ENCOUNTER — MYC MEDICAL ADVICE (OUTPATIENT)
Dept: FAMILY MEDICINE | Facility: CLINIC | Age: 63
End: 2023-05-30
Payer: COMMERCIAL

## 2023-05-30 ENCOUNTER — MYC MEDICAL ADVICE (OUTPATIENT)
Dept: ORTHOPEDICS | Facility: CLINIC | Age: 63
End: 2023-05-30
Payer: COMMERCIAL

## 2023-05-31 NOTE — TELEPHONE ENCOUNTER
4/11/23 mammogram was ordered by Bettie Childress, from [facility].    Bettie- nitin at [facility] would be comparing these mammograms.  Can you give clarity to this?  BRAD Eagle

## 2023-06-01 NOTE — TELEPHONE ENCOUNTER
Comparison exams dated 2/23/2022, 11/2/2020, 5/21/2019 and 4/12/2018 are available. There is no significant change in the pattern of glandular tissue bilaterally.      Addended by Say Torres MD on 4/26/2023  3:27 PM

## 2023-06-07 ENCOUNTER — THERAPY VISIT (OUTPATIENT)
Dept: PHYSICAL THERAPY | Facility: CLINIC | Age: 63
End: 2023-06-07
Payer: COMMERCIAL

## 2023-06-07 DIAGNOSIS — M17.11 PRIMARY OSTEOARTHRITIS OF RIGHT KNEE: ICD-10-CM

## 2023-06-07 PROCEDURE — 97110 THERAPEUTIC EXERCISES: CPT | Mod: GP | Performed by: PHYSICAL THERAPIST

## 2023-06-07 PROCEDURE — 97161 PT EVAL LOW COMPLEX 20 MIN: CPT | Mod: GP | Performed by: PHYSICAL THERAPIST

## 2023-06-07 NOTE — PROGRESS NOTES
"PHYSICAL THERAPY EVALUATION  Type of Visit: Evaluation    See electronic medical record for Abuse and Falls Screening details.    Subjective         Physical Therapy Initial Evaluation: Subjective History     Injury/Condition Details:  Presenting Complaint Right Knee Pain   Onset Timing/Date 4/10/2023 (MD referral)    Mechanism Has had long standing knee pain, has been managing with cortizone injections.   Most recently it was recommended she have a HA injection and was told she needs to do 4 weeks of physical therapy prior to the authorization.   Last week she was in a lot of pain and was unable to get an appointment with Dr. Yeo so she went to [facility] where they drained \"700 of fluid\" and gave her another cortisone injection.   At today's visit she is having very little pain due to the most recently injection,     Pertinent medial history: has an ostomy bag, this bag will be in place until August. This limits her ability to move around easily. She stood for the duration of her appointment today and maintains pressure on the bag. She was not able to lay down today or perform exercises in supine.      Symptom Behavior Details    Primary Symptoms Sporadic symptoms; Activity/position dependent, pain (Location: global knee pain, maybe more biased medially, Quality: Aching/Throbbing), weakness, swelling   Worst Pain 2-3/10 (with walking)   Symptom Provocators Stairs, waling   Best Pain 0/10    Symptom Relievers Injections   Time of day dependent? No   Recent symptom change? symptoms improving     Prior Testing/Intervention for current condition:  Prior Tests  x-ray   Prior Treatment Injections: yes (yes helpful)     Lifestyle & General Medical History:  Employment Not employed   Usual physical activities  (within past year) Gardening, fishing   Orthopaedic history See Epic Chart   Notable medical history See Epic Chart   Patient Reported Health good        Objective   Lower Extremity Physical Therapy Examination    Dynamic " Movement Screen:  2 leg stance: equal weight bearing, maintains a hand pressure on her right lower abdomen to keep her 'bag from leaking'     1 leg stance: Right: requires UE support, proprio deficit; Left: requires UE support, proprio deficit    Gait: antalgic, trendelenberg gait pattern    Knee Joint ROM   Hyperextension Extension Flexion   Right 0 deg 0 deg (measured in incline standing) At least 90 deg, measured in incline standing, not at end range   Left - deg 0 deg Same as above deg             Hip Joint ROM: could not assess     Lower Extremity Muscle Strength (x/5): could not assess    Basic Muscle Activation:  Transversus Abdominus: compromised due to presence of ostomy bag.   Quadriceps: Right: Fair, able to visualize, no extensor lag noted with incline SLR, Left: good    Knee Joint Effusion (Stroke Test Assessment):  Right: None > visible fullness but no wave sign  Left: None     Lower Extremity Flexibility Screen: unable to assess due to inability to lay supine    Palpation:   Tender to palpation at the following structures: Medial joint line  NOT tender to palpation at the following structures: Lateral joint line      Assessment & Plan   CLINICAL IMPRESSIONS   Medical Diagnosis: Right knee OA    Treatment Diagnosis: Right knee pain   Impression/Assessment: Patient is a 62 year old female with Right knee complaints.  The following significant findings have been identified: Pain, Decreased ROM/flexibility, Decreased strength, Impaired balance, Impaired gait and Decreased activity tolerance. These impairments interfere with their ability to perform self care tasks and household mobility as compared to previous level of function.     Clinical Decision Making (Complexity):   Clinical Presentation: Stable/Uncomplicated  Clinical Presentation Rationale: based on medical and personal factors listed in PT evaluation  Clinical Decision Making (Complexity): Low complexity    PLAN OF CARE  Treatment  Interventions:  Interventions: Gait Training, Neuromuscular Re-education, Therapeutic Activity, Therapeutic Exercise    Long Term Goals     PT Goal 1  Goal Identifier: Walking  Goal Description: Patient to ambulate around the neighborhood for 3-4 blocks without increased knee pain  Rationale: to maximize safety and independence within the community  Target Date: 07/06/23      Frequency of Treatment: 1x/week  Duration of Treatment: 4 weeks    Recommended Referrals to Other Professionals: Physical Therapy  Education Assessment:   Learner/Method: Patient;No Barriers to Learning    Risks and benefits of evaluation/treatment have been explained.   Patient/Family/caregiver agrees with Plan of Care.     Evaluation Time:     PT Eval, Low Complexity Minutes (29160): 14    Signing Clinician: Fannie Stewart PT

## 2023-06-08 ASSESSMENT — ACTIVITIES OF DAILY LIVING (ADL)
LIMPING: I DO NOT HAVE THE SYMPTOM
SQUAT: ACTIVITY IS MINIMALLY DIFFICULT
KNEE_ACTIVITY_OF_DAILY_LIVING_SCORE: 82.86
GO UP STAIRS: ACTIVITY IS MINIMALLY DIFFICULT
AS_A_RESULT_OF_YOUR_KNEE_INJURY,_HOW_WOULD_YOU_RATE_YOUR_CURRENT_LEVEL_OF_DAILY_ACTIVITY?: NEARLY NORMAL
GO DOWN STAIRS: ACTIVITY IS MINIMALLY DIFFICULT
HOW_WOULD_YOU_RATE_THE_OVERALL_FUNCTION_OF_YOUR_KNEE_DURING_YOUR_USUAL_DAILY_ACTIVITIES?: NEARLY NORMAL
STAND: ACTIVITY IS MINIMALLY DIFFICULT
STIFFNESS: I DO NOT HAVE THE SYMPTOM
SWELLING: I DO NOT HAVE THE SYMPTOM
RISE FROM A CHAIR: ACTIVITY IS MINIMALLY DIFFICULT
RAW_SCORE: 58
PAIN: I DO NOT HAVE THE SYMPTOM
KNEE_ACTIVITY_OF_DAILY_LIVING_SUM: 58
WALK: ACTIVITY IS MINIMALLY DIFFICULT
WEAKNESS: I DO NOT HAVE THE SYMPTOM
KNEEL ON THE FRONT OF YOUR KNEE: I AM UNABLE TO DO THE ACTIVITY
SIT WITH YOUR KNEE BENT: ACTIVITY IS MINIMALLY DIFFICULT
GIVING WAY, BUCKLING OR SHIFTING OF KNEE: I DO NOT HAVE THE SYMPTOM

## 2023-07-05 ENCOUNTER — TELEPHONE (OUTPATIENT)
Dept: ORTHOPEDICS | Facility: CLINIC | Age: 63
End: 2023-07-05
Payer: COMMERCIAL

## 2023-07-05 NOTE — TELEPHONE ENCOUNTER
Patient Returning Call    Reason for call: patient calling has apt next Monday for right knee and is wanting for provider to take a look at her left knee because she can hardly stand on her left knee      Information relayed to patient:  te sent to clinic     Patient has additional questions:  No      [facility] or would you prefer to receive a phone call?:   Patient would prefer a phone call   Okay to leave a detailed message?: Yes at Cell number on file:    Telephone Information:   Mobile [phone]

## 2023-07-07 NOTE — TELEPHONE ENCOUNTER
LVM with detailed information stating we can evaluate patient's left knee at upcoming visit on 7/10/23. Provided call back number if she had additional questions.    Susanna Khan MBA, ATC

## 2023-07-10 ENCOUNTER — ANCILLARY PROCEDURE (OUTPATIENT)
Dept: GENERAL RADIOLOGY | Facility: CLINIC | Age: 63
End: 2023-07-10
Attending: FAMILY MEDICINE
Payer: COMMERCIAL

## 2023-07-10 ENCOUNTER — OFFICE VISIT (OUTPATIENT)
Dept: ORTHOPEDICS | Facility: CLINIC | Age: 63
End: 2023-07-10
Payer: COMMERCIAL

## 2023-07-10 VITALS — WEIGHT: 170 LBS | BODY MASS INDEX: 33.38 KG/M2 | HEIGHT: 60 IN

## 2023-07-10 DIAGNOSIS — M25.562 ACUTE PAIN OF LEFT KNEE: Primary | ICD-10-CM

## 2023-07-10 DIAGNOSIS — M17.11 PRIMARY OSTEOARTHRITIS OF RIGHT KNEE: ICD-10-CM

## 2023-07-10 DIAGNOSIS — M25.562 ACUTE PAIN OF LEFT KNEE: ICD-10-CM

## 2023-07-10 DIAGNOSIS — M17.12 PRIMARY OSTEOARTHRITIS OF LEFT KNEE: ICD-10-CM

## 2023-07-10 PROCEDURE — 99213 OFFICE O/P EST LOW 20 MIN: CPT | Mod: 25 | Performed by: FAMILY MEDICINE

## 2023-07-10 PROCEDURE — 20611 DRAIN/INJ JOINT/BURSA W/US: CPT | Mod: 50 | Performed by: FAMILY MEDICINE

## 2023-07-10 PROCEDURE — 73562 X-RAY EXAM OF KNEE 3: CPT | Mod: LT | Performed by: FAMILY MEDICINE

## 2023-07-10 RX ORDER — LIDOCAINE HYDROCHLORIDE 10 MG/ML
5 INJECTION, SOLUTION INFILTRATION; PERINEURAL
Status: DISCONTINUED | OUTPATIENT
Start: 2023-07-10 | End: 2024-07-30

## 2023-07-10 RX ORDER — HYDROXYCHLOROQUINE SULFATE 200 MG/1
2 TABLET, FILM COATED ORAL DAILY
COMMUNITY
Start: 2023-06-26

## 2023-07-10 RX ORDER — LOPERAMIDE HCL 2 MG
4 CAPSULE ORAL 3 TIMES DAILY
COMMUNITY
Start: 2023-05-22

## 2023-07-10 RX ORDER — DIPHENOXYLATE HCL/ATROPINE 2.5-.025MG
TABLET ORAL
COMMUNITY
Start: 2023-07-07 | End: 2024-07-29

## 2023-07-10 RX ORDER — PREDNISONE 5 MG/1
TABLET ORAL
COMMUNITY

## 2023-07-10 RX ORDER — METHYLPREDNISOLONE ACETATE 40 MG/ML
40 INJECTION, SUSPENSION INTRA-ARTICULAR; INTRALESIONAL; INTRAMUSCULAR; SOFT TISSUE
Status: DISCONTINUED | OUTPATIENT
Start: 2023-07-10 | End: 2024-07-30

## 2023-07-10 RX ORDER — LIDOCAINE HYDROCHLORIDE 10 MG/ML
5 INJECTION, SOLUTION INFILTRATION; PERINEURAL
Status: SHIPPED | OUTPATIENT
Start: 2023-07-10

## 2023-07-10 RX ORDER — ROPIVACAINE HYDROCHLORIDE 5 MG/ML
4 INJECTION, SOLUTION EPIDURAL; INFILTRATION; PERINEURAL
Status: DISCONTINUED | OUTPATIENT
Start: 2023-07-10 | End: 2024-07-30

## 2023-07-10 RX ADMIN — LIDOCAINE HYDROCHLORIDE 5 ML: 10 INJECTION, SOLUTION INFILTRATION; PERINEURAL at 15:12

## 2023-07-10 RX ADMIN — METHYLPREDNISOLONE ACETATE 40 MG: 40 INJECTION, SUSPENSION INTRA-ARTICULAR; INTRALESIONAL; INTRAMUSCULAR; SOFT TISSUE at 15:12

## 2023-07-10 RX ADMIN — ROPIVACAINE HYDROCHLORIDE 4 ML: 5 INJECTION, SOLUTION EPIDURAL; INFILTRATION; PERINEURAL at 15:12

## 2023-07-10 NOTE — PATIENT INSTRUCTIONS
1. Acute pain of left knee    2. Primary osteoarthritis of left knee    3. Primary osteoarthritis of right knee      -Is following up for chronic bilateral knee pain due to arthritis  -Patient tolerated right knee Synvisc 1 injection and left knee cortisone injection today without complications.  Patient was given postprocedure instructions  -Patient will follow up in 4 weeks to administer the Durolane medication for the left knee  -Call direct clinic number [[phone]] at any time with questions or concerns.    Albert Yeo MD [facility]  [facility] [place]

## 2023-07-10 NOTE — PROGRESS NOTES
ASSESSMENT & PLAN  Patient Instructions     1. Acute pain of left knee    2. Primary osteoarthritis of left knee    3. Primary osteoarthritis of right knee      -Is following up for chronic bilateral knee pain due to arthritis  -Patient tolerated right knee Synvisc 1 injection and left knee cortisone injection today without complications.  Patient was given postprocedure instructions  -Patient will follow up in 4 weeks to administer the Durolane medication for the left knee  -Call direct clinic number [[phone]] at any time with questions or concerns.    Albert Yeo MD [facility] Orthopedics and Sports Medicine  [facility]          -----    SUBJECTIVE:  Maeve Lovell is a 62 year old female who is seen in follow-up for right knee pain. They were last seen 4/10/23 and had US guided right knee aspiration and cortisone injection. Patient reports she was seen at [facility] ~ 6 weeks ago and had right knee aspiration and cortisone injection. She reports they aspirated 70cc of fluid from right knee.    Since their last visit relief for ~ 2 weeks and then pain returned. They indicate that their current pain level is 2/10.     Patient also complains of left knee pain ongoing for ~ 6 weeks. Patient denies new injury / acute trauma. Patient notes pain is located left lateral knee. Pain increases with walking, activity, stairs, twisting / pivoting. Pain improves with rest / activity avoidance. Patient reports pain at worst is 8/10 and currently is 4/10.     They have tried rest/activity avoidance and ice.      The patient is seen by themselves.    Patient's past medical, surgical, social, and family histories were reviewed today and no changes are noted.    REVIEW OF SYSTEMS:  Constitutional: NEGATIVE for fever, chills, change in weight  Skin: NEGATIVE for worrisome rashes, moles or lesions  GI/: NEGATIVE for bowel or bladder changes  Neuro: NEGATIVE for weakness, dizziness or  paresthesias    OBJECTIVE:  Ht 1.524 m (5')   Wt 77.1 kg (170 lb)   BMI 33.20 kg/m     General: healthy, alert and in no distress  HEENT: no scleral icterus or conjunctival erythema  Skin: no suspicious lesions or rash. No jaundice.  CV: regular rhythm by palpation, no pedal edema  Resp: normal respiratory effort without conversational dyspnea   Psych: normal mood and affect  Gait: normal steady gait with appropriate coordination and balance  Neuro: normal light touch sensory exam of the extremities.    MSK:  BILATERAL KNEE  Inspection:    normal alignment  Palpation:    Tender about the lateral joint line and medial joint line. Remainder of bony and ligamentous landmarks are nontender.    Mild effusion is present    Patellofemoral crepitus is Absent  Range of Motion:     00 extension to 1100 flexion  Strength:    Quadriceps grossly intact    Extensor mechanism intact  Special Tests:    Positive: none    Negative: MCL/valgus stress (0 & 30 deg), LCL/varus stress (0 & 30 deg), Lachman's, anterior drawer, posterior drawer, Delbert's      Independent visualization of the below image:    Mild medial and patellofemoral compartment joint space narrowing with osteophytes.  Osteophytes also present at the posterior tibia.  No acute fracture or dislocation.    Large Joint Injection/Arthocentesis: bilateral knee    Date/Time: 7/10/2023 3:12 PM    Performed by: Yeo, Albert, MD  Authorized by: Yeo, Albert, MD    Indications:  Pain and osteoarthritis  Needle Size:  22 G  Guidance: ultrasound    Approach:  Anterolateral  Location:  Knee  Laterality:  Bilateral      Medications (Right):  48 mg hylan 48 MG/6ML; 5 mL lidocaine 1 %  Medications (Left):  40 mg methylPREDNISolone 40 MG/ML; 5 mL lidocaine 1 %; 4 mL ropivacaine 5 MG/ML  Outcome:  Tolerated well, no immediate complications  Procedure discussed: discussed risks, benefits, and alternatives    Consent Given by:  Patient  Timeout: timeout called immediately prior to  procedure    Prep: patient was prepped and draped in usual sterile fashion     Ultrasound was used to ensure safe and accurate needle placement and injection. Ultrasound images of the procedure were permanently stored.            Albert Yeo MD, [facility] Sports and Orthopedic [facility]

## 2023-07-10 NOTE — LETTER
7/10/2023         RE: Maeve Lovell  [address]  [place] [address]        Dear Colleague,    Thank you for referring your patient, Maeve Lovell, to the [facility] [place]. Please see a copy of my visit note below.    ASSESSMENT & PLAN  Patient Instructions     1. Acute pain of left knee    2. Primary osteoarthritis of left knee    3. Primary osteoarthritis of right knee      -Is following up for chronic bilateral knee pain due to arthritis  -Patient tolerated right knee Synvisc 1 injection and left knee cortisone injection today without complications.  Patient was given postprocedure instructions  -Patient will follow up in 4 weeks to administer the Durolane medication for the left knee  -Call direct clinic number [[phone]] at any time with questions or concerns.    Albert Yeo MD [facility] Orthopedics and Sports Medicine  [facility]          -----    SUBJECTIVE:  Maeve Lovell is a 62 year old female who is seen in follow-up for right knee pain. They were last seen 4/10/23 and had US guided right knee aspiration and cortisone injection. Patient reports she was seen at [facility] ~ 6 weeks ago and had right knee aspiration and cortisone injection. She reports they aspirated 70cc of fluid from right knee.    Since their last visit relief for ~ 2 weeks and then pain returned. They indicate that their current pain level is 2/10.     Patient also complains of left knee pain ongoing for ~ 6 weeks. Patient denies new injury / acute trauma. Patient notes pain is located left lateral knee. Pain increases with walking, activity, stairs, twisting / pivoting. Pain improves with rest / activity avoidance. Patient reports pain at worst is 8/10 and currently is 4/10.     They have tried rest/activity avoidance and ice.      The patient is seen by themselves.    Patient's past medical, surgical, social, and family histories were reviewed today and no  changes are noted.    REVIEW OF SYSTEMS:  Constitutional: NEGATIVE for fever, chills, change in weight  Skin: NEGATIVE for worrisome rashes, moles or lesions  GI/: NEGATIVE for bowel or bladder changes  Neuro: NEGATIVE for weakness, dizziness or paresthesias    OBJECTIVE:  Ht 1.524 m (5')   Wt 77.1 kg (170 lb)   BMI 33.20 kg/m     General: healthy, alert and in no distress  HEENT: no scleral icterus or conjunctival erythema  Skin: no suspicious lesions or rash. No jaundice.  CV: regular rhythm by palpation, no pedal edema  Resp: normal respiratory effort without conversational dyspnea   Psych: normal mood and affect  Gait: normal steady gait with appropriate coordination and balance  Neuro: normal light touch sensory exam of the extremities.    MSK:  BILATERAL KNEE  Inspection:    normal alignment  Palpation:    Tender about the lateral joint line and medial joint line. Remainder of bony and ligamentous landmarks are nontender.    Mild effusion is present    Patellofemoral crepitus is Absent  Range of Motion:     00 extension to 1100 flexion  Strength:    Quadriceps grossly intact    Extensor mechanism intact  Special Tests:    Positive: none    Negative: MCL/valgus stress (0 & 30 deg), LCL/varus stress (0 & 30 deg), Lachman's, anterior drawer, posterior drawer, Delbert's      Independent visualization of the below image:    Large Joint Injection/Arthocentesis: bilateral knee    Date/Time: 7/10/2023 3:12 PM    Performed by: Yeo, Albert, MD  Authorized by: Yeo, Albert, MD    Indications:  Pain and osteoarthritis  Needle Size:  22 G  Guidance: ultrasound    Approach:  Anterolateral  Location:  Knee  Laterality:  Bilateral      Medications (Right):  48 mg hylan 48 MG/6ML; 5 mL lidocaine 1 %  Medications (Left):  40 mg methylPREDNISolone 40 MG/ML; 5 mL lidocaine 1 %; 4 mL ropivacaine 5 MG/ML  Outcome:  Tolerated well, no immediate complications  Procedure discussed: discussed risks, benefits, and alternatives     Consent Given by:  Patient  Timeout: timeout called immediately prior to procedure    Prep: patient was prepped and draped in usual sterile fashion     Ultrasound was used to ensure safe and accurate needle placement and injection. Ultrasound images of the procedure were permanently stored.            Albert Yeo MD, [facility]            Again, thank you for allowing me to participate in the care of your patient.        Sincerely,        Albert Yeo, MD

## 2023-08-07 ENCOUNTER — OFFICE VISIT (OUTPATIENT)
Dept: ORTHOPEDICS | Facility: CLINIC | Age: 63
End: 2023-08-07
Payer: COMMERCIAL

## 2023-08-07 ENCOUNTER — ANCILLARY PROCEDURE (OUTPATIENT)
Dept: GENERAL RADIOLOGY | Facility: CLINIC | Age: 63
End: 2023-08-07
Attending: FAMILY MEDICINE
Payer: COMMERCIAL

## 2023-08-07 VITALS
DIASTOLIC BLOOD PRESSURE: 78 MMHG | HEIGHT: 60 IN | BODY MASS INDEX: 33.38 KG/M2 | WEIGHT: 170 LBS | SYSTOLIC BLOOD PRESSURE: 116 MMHG

## 2023-08-07 DIAGNOSIS — M17.12 PRIMARY OSTEOARTHRITIS OF LEFT KNEE: ICD-10-CM

## 2023-08-07 DIAGNOSIS — M19.072 PRIMARY OSTEOARTHRITIS OF LEFT ANKLE: ICD-10-CM

## 2023-08-07 DIAGNOSIS — M17.11 PRIMARY OSTEOARTHRITIS OF RIGHT KNEE: ICD-10-CM

## 2023-08-07 DIAGNOSIS — M25.572 ACUTE LEFT ANKLE PAIN: ICD-10-CM

## 2023-08-07 DIAGNOSIS — M25.572 ACUTE LEFT ANKLE PAIN: Primary | ICD-10-CM

## 2023-08-07 PROCEDURE — 73610 X-RAY EXAM OF ANKLE: CPT | Mod: TC | Performed by: RADIOLOGY

## 2023-08-07 PROCEDURE — 99214 OFFICE O/P EST MOD 30 MIN: CPT | Mod: 25 | Performed by: FAMILY MEDICINE

## 2023-08-07 PROCEDURE — 20606 DRAIN/INJ JOINT/BURSA W/US: CPT | Mod: LT | Performed by: FAMILY MEDICINE

## 2023-08-07 RX ORDER — LIDOCAINE HYDROCHLORIDE 10 MG/ML
1 INJECTION, SOLUTION INFILTRATION; PERINEURAL
Status: DISCONTINUED | OUTPATIENT
Start: 2023-08-07 | End: 2024-07-30

## 2023-08-07 RX ORDER — METHYLPREDNISOLONE ACETATE 40 MG/ML
40 INJECTION, SUSPENSION INTRA-ARTICULAR; INTRALESIONAL; INTRAMUSCULAR; SOFT TISSUE
Status: DISCONTINUED | OUTPATIENT
Start: 2023-08-07 | End: 2024-07-30

## 2023-08-07 RX ADMIN — LIDOCAINE HYDROCHLORIDE 1 ML: 10 INJECTION, SOLUTION INFILTRATION; PERINEURAL at 15:42

## 2023-08-07 RX ADMIN — METHYLPREDNISOLONE ACETATE 40 MG: 40 INJECTION, SUSPENSION INTRA-ARTICULAR; INTRALESIONAL; INTRAMUSCULAR; SOFT TISSUE at 15:42

## 2023-08-07 NOTE — PROGRESS NOTES
ASSESSMENT & PLAN  Patient Instructions     1. Acute left ankle pain    2. Primary osteoarthritis of left ankle    3. Primary osteoarthritis of left knee    4. Primary osteoarthritis of right knee      -Patient was originally scheduled for follow-up of bilateral knee pain due to arthritis but her pain has dramatically improved since her previous visit.  Patient reports acute left ankle pain partially due to osteoarthritis and likely mainly due to rheumatoid arthritis  -Patient states that her left ankle pain is constant, slight worsening with weightbearing activity but is severe both at rest and with activity  -Patient is seeing rheumatology and is on Enbrel and recently started Plaquenil.  They are also considering starting an additional medication.  -Patient tolerated left tibiotalar joint intra-articular cortisone injection today without complications.  Patient was given postprocedure instructions  -Patient was advised to follow-up with her rheumatologist for ongoing treatment of her ankle.  I would not add any other oral medications so as not to interact with her other treatments.  I do not recommend therapy, bracing or boot since weightbearing activity does not significantly worsen her pain.  -Patient may follow-up for periodic cortisone injections at the direction of her rheumatologist  -Call direct clinic number [[phone]] at any time with questions or concerns.    Albert Yeo MD [facility]  [facility]        -----    SUBJECTIVE:  Maeve Lovell is a 62 year old female who is seen in follow-up for left knee injection.They were last seen 7/10/2023 for bilateral Synvisc injection.     Since their last visit reports 95% improvement. They indicate that their current pain level is 0/10. They have tried corticosteroid injection (most recent date: 7/10/2023) that provided current relief.     The patient is seen by themselves.    Patient's past medical,  surgical, social, and family histories were reviewed today and no changes are noted.      ASSESSMENT & PLAN  Patient Instructions     1. Acute left ankle pain    2. Primary osteoarthritis of left ankle    3. Primary osteoarthritis of left knee    4. Primary osteoarthritis of right knee      -Patient was originally scheduled for follow-up of bilateral knee pain due to arthritis but her pain has dramatically improved since her previous visit.  Patient reports acute left ankle pain partially due to osteoarthritis and likely mainly due to rheumatoid arthritis  -Patient states that her left ankle pain is constant, slight worsening with weightbearing activity but is severe both at rest and with activity  -Patient is seeing rheumatology and is on Enbrel and recently started Plaquenil.  They are also considering starting an additional medication.  -Patient tolerated left tibiotalar joint intra-articular cortisone injection today without complications.  Patient was given postprocedure instructions  -Patient was advised to follow-up with her rheumatologist for ongoing treatment of her ankle.  I would not add any other oral medications so as not to interact with her other treatments.  I do not recommend therapy, bracing or boot since weightbearing activity does not significantly worsen her pain.  -Patient may follow-up for periodic cortisone injections at the direction of her rheumatologist  -Call direct clinic number [[phone]] at any time with questions or concerns.    Albert Yeo MD [facility] Orthopedics and Sports Medicine  [facility] [place]          -----    SUBJECTIVE  Maeve Lovell is a/an 62 year old female who is seen as a self referral for evaluation of left ankle pain. The patient is seen by themselves.    Onset: 1 month(s) ago. Reports insidious onset without acute precipitating event. Patient states after the injection it felt like the pain moved to her ankle.  20 minutes of horrible pain  then 15 minutes of less pain.   Location of Pain: left retinaculum and lateral malleolus  Rating of Pain at worst: 10/10  Rating of Pain Currently: 8/10  Worsened by: constant pain   Better with: embrol and prednisone   Treatments tried: other medications: Prednisone (Medrol), ice, elevating at night   Associated symptoms: swelling and feeling of instability  Orthopedic history: NO  Relevant surgical history: YES - Date: broken both ankles and bunion surgery about 10-15 years ago   Social history: social history: retired    Past Medical History:   Diagnosis Date    Lichen sclerosus of female genitalia     RA (rheumatoid arthritis) (H)     Ulcerative colitis (H)      Social History     Socioeconomic History    Marital status: Single     Spouse name: None    Number of children: None    Years of education: None    Highest education level: None   Tobacco Use    Smoking status: Never    Smokeless tobacco: Never   Vaping Use    Vaping Use: Never used   Substance and Sexual Activity    Alcohol use: Not Currently    Drug use: Not Currently    Sexual activity: Not Currently          Patient's past medical, surgical, social, and family histories were reviewed today and no changes are noted.    REVIEW OF SYSTEMS:  10 point ROS is negative other than symptoms noted above in HPI, Past Medical History or as stated below  Constitutional: NEGATIVE for fever, chills, change in weight  Skin: NEGATIVE for worrisome rashes, moles or lesions  GI/: NEGATIVE for bowel or bladder changes  Neuro: NEGATIVE for weakness, dizziness or paresthesias    OBJECTIVE:  /78   Ht 1.524 m (5')   Wt 77.1 kg (170 lb)   BMI 33.20 kg/m     General: healthy, alert and in no distress  HEENT: no scleral icterus or conjunctival erythema  Skin: no suspicious lesions or rash. No jaundice.  CV:  no pedal edema  Resp: normal respiratory effort without conversational dyspnea   Psych: normal mood and affect  Gait: normal steady gait with appropriate  coordination and balance  Neuro: Normal light sensory exam of lower extremity  MSK:  LEFT ANKLE  Inspection:    No swelling or ecchymosis is observed  Palpation:    Tender about the medial malleolus and the patellar joint. Remainder of bony and ligamentous landmarks are nontender.  Range of Motion:     Plantarflexion within normal limits / dorsiflexion within normal limits / inversion within normal limits / eversion within normal limits  Strength:    within normal limits  Special Tests:    negative anterior drawer, negative talar tilt, negative valgus stress, negative forced external rotation/eversion, negative Archer sign, negative squeeze test.     Medium Joint Injection/Arthrocentesis: L ankle    Date/Time: 8/7/2023 3:42 PM    Performed by: Yeo, Albert, MD  Authorized by: Yeo, Albert, MD    Indications:  Pain  Needle Size:  25 G  Guidance: ultrasound    Approach:  Anteromedial  Location:  Ankle  Site:  L ankle  Medications:  40 mg methylPREDNISolone 40 MG/ML; 1 mL lidocaine 1 %  Outcome:  Tolerated well, no immediate complications  Procedure discussed: discussed risks, benefits, and alternatives    Consent Given by:  Patient  Timeout: timeout called immediately prior to procedure    Prep: patient was prepped and draped in usual sterile fashion     Ultrasound was used to ensure safe and accurate needle placement and injection. Ultrasound images of the procedure were permanently stored.          Independent visualization of the below image:  Recent Results (from the past 24 hour(s))   XR Ankle Left G/E 3 Views    Narrative    XR ANKLE LEFT G/E 3 VIEWS 8/7/2023 3:11 PM     HISTORY: Acute left ankle pain    COMPARISON: None.         Impression    IMPRESSION: Soft tissue swelling about the ankle without evidence for  acute appearing fracture or disruption of ankle mortise. There is a  small tibiotalar joint effusion. Accessory os trigonum.    FATMATA BERGER MD         SYSTEM ID:  [id number]           Albert Yeo MD  [facility]

## 2023-08-07 NOTE — LETTER
8/7/2023         RE: Maeve Lovell  [address]  [place] [address]        Dear Colleague,    Thank you for referring your patient, Maeve Lovell, to the [facility] [place]. Please see a copy of my visit note below.    ASSESSMENT & PLAN  Patient Instructions     1. Acute left ankle pain    2. Primary osteoarthritis of left ankle    3. Primary osteoarthritis of left knee    4. Primary osteoarthritis of right knee      -Patient was originally scheduled for follow-up of bilateral knee pain due to arthritis but her pain has dramatically improved since her previous visit.  Patient reports acute left ankle pain partially due to osteoarthritis and likely mainly due to rheumatoid arthritis  -Patient states that her left ankle pain is constant, slight worsening with weightbearing activity but is severe both at rest and with activity  -Patient is seeing rheumatology and is on Enbrel and recently started Plaquenil.  They are also considering starting an additional medication.  -Patient tolerated left tibiotalar joint intra-articular cortisone injection today without complications.  Patient was given postprocedure instructions  -Patient was advised to follow-up with her rheumatologist for ongoing treatment of her ankle.  I would not add any other oral medications so as not to interact with her other treatments.  I do not recommend therapy, bracing or boot since weightbearing activity does not significantly worsen her pain.  -Patient may follow-up for periodic cortisone injections at the direction of her rheumatologist  -Call direct clinic number [[phone]] at any time with questions or concerns.    Albert Yeo MD [facility] Orthopedics and Sports Medicine  [facility] [place]        -----    SUBJECTIVE:  Maeve Lovell is a 62 year old female who is seen in follow-up for left knee injection.They were last seen 7/10/2023 for bilateral Synvisc injection.      Since their last visit reports 95% improvement. They indicate that their current pain level is 0/10. They have tried corticosteroid injection (most recent date: 7/10/2023) that provided current relief.     The patient is seen by themselves.    Patient's past medical, surgical, social, and family histories were reviewed today and no changes are noted.      ASSESSMENT & PLAN  Patient Instructions     1. Acute left ankle pain    2. Primary osteoarthritis of left ankle    3. Primary osteoarthritis of left knee    4. Primary osteoarthritis of right knee      -Patient was originally scheduled for follow-up of bilateral knee pain due to arthritis but her pain has dramatically improved since her previous visit.  Patient reports acute left ankle pain partially due to osteoarthritis and likely mainly due to rheumatoid arthritis  -Patient states that her left ankle pain is constant, slight worsening with weightbearing activity but is severe both at rest and with activity  -Patient is seeing rheumatology and is on Enbrel and recently started Plaquenil.  They are also considering starting an additional medication.  -Patient tolerated left tibiotalar joint intra-articular cortisone injection today without complications.  Patient was given postprocedure instructions  -Patient was advised to follow-up with her rheumatologist for ongoing treatment of her ankle.  I would not add any other oral medications so as not to interact with her other treatments.  I do not recommend therapy, bracing or boot since weightbearing activity does not significantly worsen her pain.  -Patient may follow-up for periodic cortisone injections at the direction of her rheumatologist  -Call direct clinic number [[phone]] at any time with questions or concerns.    Albert Yeo MD [facility] Orthopedics and Sports Medicine  [facility] [place]          -----    SUBJECTIVE  Maeve Fishtimbo is a/an 62 year old female who is seen as a  self referral for evaluation of left ankle pain. The patient is seen by themselves.    Onset: 1 month(s) ago. Reports insidious onset without acute precipitating event. Patient states after the injection it felt like the pain moved to her ankle.  20 minutes of horrible pain then 15 minutes of less pain.   Location of Pain: left retinaculum and lateral malleolus  Rating of Pain at worst: 10/10  Rating of Pain Currently: 8/10  Worsened by: constant pain   Better with: embrol and prednisone   Treatments tried: other medications: Prednisone (Medrol), ice, elevating at night   Associated symptoms: swelling and feeling of instability  Orthopedic history: NO  Relevant surgical history: YES - Date: broken both ankles and bunion surgery about 10-15 years ago   Social history: social history: retired    Past Medical History:   Diagnosis Date     Lichen sclerosus of female genitalia      RA (rheumatoid arthritis) (H)      Ulcerative colitis (H)      Social History     Socioeconomic History     Marital status: Single     Spouse name: None     Number of children: None     Years of education: None     Highest education level: None   Tobacco Use     Smoking status: Never     Smokeless tobacco: Never   Vaping Use     Vaping Use: Never used   Substance and Sexual Activity     Alcohol use: Not Currently     Drug use: Not Currently     Sexual activity: Not Currently          Patient's past medical, surgical, social, and family histories were reviewed today and no changes are noted.    REVIEW OF SYSTEMS:  10 point ROS is negative other than symptoms noted above in HPI, Past Medical History or as stated below  Constitutional: NEGATIVE for fever, chills, change in weight  Skin: NEGATIVE for worrisome rashes, moles or lesions  GI/: NEGATIVE for bowel or bladder changes  Neuro: NEGATIVE for weakness, dizziness or paresthesias    OBJECTIVE:  /78   Ht 1.524 m (5')   Wt 77.1 kg (170 lb)   BMI 33.20 kg/m     General: healthy, alert  and in no distress  HEENT: no scleral icterus or conjunctival erythema  Skin: no suspicious lesions or rash. No jaundice.  CV:  no pedal edema  Resp: normal respiratory effort without conversational dyspnea   Psych: normal mood and affect  Gait: normal steady gait with appropriate coordination and balance  Neuro: Normal light sensory exam of lower extremity  MSK:  LEFT ANKLE  Inspection:    No swelling or ecchymosis is observed  Palpation:    Tender about the medial malleolus and the patellar joint. Remainder of bony and ligamentous landmarks are nontender.  Range of Motion:     Plantarflexion within normal limits / dorsiflexion within normal limits / inversion within normal limits / eversion within normal limits  Strength:    within normal limits  Special Tests:    negative anterior drawer, negative talar tilt, negative valgus stress, negative forced external rotation/eversion, negative Archer sign, negative squeeze test.     Medium Joint Injection/Arthrocentesis: L ankle    Date/Time: 8/7/2023 3:42 PM    Performed by: Yeo, Albert, MD  Authorized by: Yeo, Albert, MD    Indications:  Pain  Needle Size:  25 G  Guidance: ultrasound    Approach:  Anteromedial  Location:  Ankle  Site:  L ankle  Medications:  40 mg methylPREDNISolone 40 MG/ML; 1 mL lidocaine 1 %  Outcome:  Tolerated well, no immediate complications  Procedure discussed: discussed risks, benefits, and alternatives    Consent Given by:  Patient  Timeout: timeout called immediately prior to procedure    Prep: patient was prepped and draped in usual sterile fashion     Ultrasound was used to ensure safe and accurate needle placement and injection. Ultrasound images of the procedure were permanently stored.          Independent visualization of the below image:  Recent Results (from the past 24 hour(s))   XR Ankle Left G/E 3 Views    Narrative    XR ANKLE LEFT G/E 3 VIEWS 8/7/2023 3:11 PM     HISTORY: Acute left ankle pain    COMPARISON: None.          Impression    IMPRESSION: Soft tissue swelling about the ankle without evidence for  acute appearing fracture or disruption of ankle mortise. There is a  small tibiotalar joint effusion. Accessory os trigonum.    FATMATA BERGRE MD         SYSTEM ID:  [id number]           Albert Yeo MD [facility]          Again, thank you for allowing me to participate in the care of your patient.        Sincerely,        Albert Yeo, MD

## 2023-08-07 NOTE — PATIENT INSTRUCTIONS
1. Acute left ankle pain    2. Primary osteoarthritis of left ankle    3. Primary osteoarthritis of left knee    4. Primary osteoarthritis of right knee      -Patient was originally scheduled for follow-up of bilateral knee pain due to arthritis but her pain has dramatically improved since her previous visit.  Patient reports acute left ankle pain partially due to osteoarthritis and likely mainly due to rheumatoid arthritis  -Patient states that her left ankle pain is constant, slight worsening with weightbearing activity but is severe both at rest and with activity  -Patient is seeing rheumatology and is on Enbrel and recently started Plaquenil.  They are also considering starting an additional medication.  -Patient tolerated left tibiotalar joint intra-articular cortisone injection today without complications.  Patient was given postprocedure instructions  -Patient was advised to follow-up with her rheumatologist for ongoing treatment of her ankle.  I would not add any other oral medications so as not to interact with her other treatments.  I do not recommend therapy, bracing or boot since weightbearing activity does not significantly worsen her pain.  -Patient may follow-up for periodic cortisone injections at the direction of her rheumatologist  -Call direct clinic number [[phone]] at any time with questions or concerns.    Albert Yeo MD [facility]  [facility] [place]

## 2023-11-26 ENCOUNTER — HEALTH MAINTENANCE LETTER (OUTPATIENT)
Age: 63
End: 2023-11-26

## 2023-12-12 ENCOUNTER — TELEPHONE (OUTPATIENT)
Dept: ORTHOPEDICS | Facility: CLINIC | Age: 63
End: 2023-12-12
Payer: MEDICARE

## 2023-12-12 NOTE — TELEPHONE ENCOUNTER
Patient was last seen on 8/7/23 and had US guided left ankle CSI. Huddled with Dr. Yeo - okay for repeat CSI. This injection will be US guided and Dr. Yeo can determine if there is any fluid in the ankle to be drained at time of visit.    Called and spoke with patient. Patient states that she had an injection in the top of her left foot ~ 1 month ago at [facility]. However, a few weeks ago she started noticing increased swelling in her ankle with increased activity. She asks if she could get another CSI in her ankle and if Dr. Yeo would be able to drain fluid from her ankle.    Informed patient that Dr. Yeo would use an US to perform the injection, and if there was fluid in the ankle joint and it was appropriate to drain, it would be possible. However, informed patient that the swelling in her ankle could possibly be soft tissue swelling and in that case we would not drain the fluid.    She states Dr. Yeo did not use the US for her last injection, so she is hoping he would this next time. Informed patient that it is documented that he used US for her last injection, but patient is adamant it was not available at that time. She also requests additional lidocaine before completing the injection. Will discuss with Dr. Yeo.    Patient requested to schedule an appt after the new year. Appt scheduled on 1/8/24.    Patient was appreciative of call back and had no further questions.    Susanna Khan MBA, ATC

## 2023-12-12 NOTE — TELEPHONE ENCOUNTER
[facility] Call Center     Phone Message     May a detailed message be left on voicemail: Yes     Reason for Call:  Patient has questions regarding getting fluid drained from the ankle and then getting a cortisone injection, does any of our providers do that? Dr. Yeo in the past has drained fluid from her knee and given her injections, she wondering does he do that for the ankle as well.

## 2024-01-08 ENCOUNTER — OFFICE VISIT (OUTPATIENT)
Dept: ORTHOPEDICS | Facility: CLINIC | Age: 64
End: 2024-01-08
Payer: MEDICARE

## 2024-01-08 DIAGNOSIS — M19.072 PRIMARY OSTEOARTHRITIS OF LEFT ANKLE: Primary | ICD-10-CM

## 2024-01-08 PROCEDURE — 20606 DRAIN/INJ JOINT/BURSA W/US: CPT | Mod: LT | Performed by: FAMILY MEDICINE

## 2024-01-08 RX ORDER — ROPIVACAINE HYDROCHLORIDE 5 MG/ML
1 INJECTION, SOLUTION EPIDURAL; INFILTRATION; PERINEURAL
Status: SHIPPED | OUTPATIENT
Start: 2024-01-08

## 2024-01-08 RX ORDER — METHYLPREDNISOLONE ACETATE 40 MG/ML
40 INJECTION, SUSPENSION INTRA-ARTICULAR; INTRALESIONAL; INTRAMUSCULAR; SOFT TISSUE
Status: SHIPPED | OUTPATIENT
Start: 2024-01-08

## 2024-01-08 RX ORDER — LIDOCAINE HYDROCHLORIDE 10 MG/ML
3 INJECTION, SOLUTION INFILTRATION; PERINEURAL
Status: SHIPPED | OUTPATIENT
Start: 2024-01-08

## 2024-01-08 RX ADMIN — LIDOCAINE HYDROCHLORIDE 3 ML: 10 INJECTION, SOLUTION INFILTRATION; PERINEURAL at 15:33

## 2024-01-08 RX ADMIN — ROPIVACAINE HYDROCHLORIDE 1 ML: 5 INJECTION, SOLUTION EPIDURAL; INFILTRATION; PERINEURAL at 15:33

## 2024-01-08 RX ADMIN — METHYLPREDNISOLONE ACETATE 40 MG: 40 INJECTION, SUSPENSION INTRA-ARTICULAR; INTRALESIONAL; INTRAMUSCULAR; SOFT TISSUE at 15:33

## 2024-01-08 NOTE — PROGRESS NOTES
ASSESSMENT & PLAN  Patient Instructions     1. Primary osteoarthritis of left ankle      -Patient is following up chronic left ankle pain due to arthritis  -Patient tolerated left tibiotalar joint cortisone injection today without complications.  Patient was given postprocedure instructions  -Patient will follow-up when pain returns  -Call direct clinic number [[phone]] at any time with questions or concerns.    Albert Yeo MD [facility] Orthopedics and Sports Medicine  [facility]        -----    SUBJECTIVE:  Maeve Lovell is a 63 year old female who is seen for left ankle pain. Had about 4 months of relief following last cortisone injection 08/7/23.     Medium Joint Injection/Arthrocentesis: L ankle    Date/Time: 1/8/2024 3:33 PM    Performed by: Yeo, Albert, MD  Authorized by: Yeo, Albert, MD    Indications:  Pain  Needle Size:  25 G  Guidance: ultrasound    Approach:  Anterior  Location:  Ankle  Site:  L ankle  Medications:  40 mg methylPREDNISolone 40 MG/ML; 3 mL lidocaine 1 %; 1 mL ROPivacaine 5 MG/ML  Outcome:  Tolerated well, no immediate complications  Procedure discussed: discussed risks, benefits, and alternatives    Consent Given by:  Patient  Timeout: timeout called immediately prior to procedure    Prep: patient was prepped and draped in usual sterile fashion     Ultrasound was used to ensure safe and accurate needle placement and injection. Ultrasound images of the procedure were permanently stored.        Patient rates pain as 2/10 pre-procedure. Patient rates pain as 2/10 post-procedure.      Albert Yeo MD, [facility]

## 2024-01-08 NOTE — PATIENT INSTRUCTIONS
1. Primary osteoarthritis of left ankle      -Patient is following up chronic left ankle pain due to arthritis  -Patient tolerated left tibiotalar joint cortisone injection today without complications.  Patient was given postprocedure instructions  -Patient will follow-up when pain returns  -Call direct clinic number [[phone]] at any time with questions or concerns.    Albert Yeo MD [facility]  [facility] [place]

## 2024-01-08 NOTE — LETTER
1/8/2024         RE: Maeve Lovell  [address]  [place] [address]        Dear Colleague,    Thank you for referring your patient, Maeve Lovell, to the [facility] [place]. Please see a copy of my visit note below.    ASSESSMENT & PLAN  Patient Instructions     1. Primary osteoarthritis of left ankle      -Patient is following up chronic left ankle pain due to arthritis  -Patient tolerated left tibiotalar joint cortisone injection today without complications.  Patient was given postprocedure instructions  -Patient will follow-up when pain returns  -Call direct clinic number [[phone]] at any time with questions or concerns.    Albert Yeo MD [facility] Orthopedics and Sports Medicine  [facility]        -----    SUBJECTIVE:  Maeve Lovell is a 63 year old female who is seen for left ankle pain. Had about 4 months of relief following last cortisone injection 08/7/23.     Medium Joint Injection/Arthrocentesis: L ankle    Date/Time: 1/8/2024 3:33 PM    Performed by: Yeo, Albert, MD  Authorized by: Yeo, Albert, MD    Indications:  Pain  Needle Size:  25 G  Guidance: ultrasound    Approach:  Anterior  Location:  Ankle  Site:  L ankle  Medications:  40 mg methylPREDNISolone 40 MG/ML; 3 mL lidocaine 1 %; 1 mL ROPivacaine 5 MG/ML  Outcome:  Tolerated well, no immediate complications  Procedure discussed: discussed risks, benefits, and alternatives    Consent Given by:  Patient  Timeout: timeout called immediately prior to procedure    Prep: patient was prepped and draped in usual sterile fashion     Ultrasound was used to ensure safe and accurate needle placement and injection. Ultrasound images of the procedure were permanently stored.        Patient rates pain as 2/10 pre-procedure. Patient rates pain as 2/10 post-procedure.      Albert Yeo MD, [facility]      Again, thank you for allowing me to participate in the  care of your patient.        Sincerely,        Albert Yeo, MD

## 2024-01-10 ENCOUNTER — OFFICE VISIT (OUTPATIENT)
Dept: ORTHOPEDICS | Facility: CLINIC | Age: 64
End: 2024-01-10
Payer: MEDICARE

## 2024-01-10 VITALS
BODY MASS INDEX: 35.34 KG/M2 | HEIGHT: 60 IN | WEIGHT: 180 LBS | DIASTOLIC BLOOD PRESSURE: 99 MMHG | SYSTOLIC BLOOD PRESSURE: 155 MMHG

## 2024-01-10 DIAGNOSIS — M17.11 PRIMARY OSTEOARTHRITIS OF RIGHT KNEE: Primary | ICD-10-CM

## 2024-01-10 PROCEDURE — 20611 DRAIN/INJ JOINT/BURSA W/US: CPT | Mod: RT | Performed by: FAMILY MEDICINE

## 2024-01-10 RX ORDER — LIDOCAINE HYDROCHLORIDE 10 MG/ML
5 INJECTION, SOLUTION INFILTRATION; PERINEURAL
Status: SHIPPED | OUTPATIENT
Start: 2024-01-10

## 2024-01-10 RX ADMIN — LIDOCAINE HYDROCHLORIDE 5 ML: 10 INJECTION, SOLUTION INFILTRATION; PERINEURAL at 12:57

## 2024-01-10 NOTE — PROGRESS NOTES
ASSESSMENT & PLAN  Patient Instructions     1. Primary osteoarthritis of right knee      -Patient is following up for chronic right knee pain due to arthritis  -Patient tolerated right knee Synvisc 1 injection today without complications.  Patient was given postprocedure instructions  -Patient will follow-up when pain returns  -Call direct clinic number [[phone]] at any time with questions or concerns.    Albert Yeo MD [facility] Orthopedics and Sports Medicine  [facility]        -----    SUBJECTIVE:  Maeve Lovell is a 63 year old female who is seen in follow-up for right knee pain.They were last seen for bilateral knees 8/7/23 . Patient states she had full improvement and that her pain was 100% gone until about a week or so ago.    Since their last visit reports 100% improvement. They indicate that their current pain level is 2/10. They have tried Tylenol.      The patient is seen by themselves.    Patient's past medical, surgical, social, and family histories were reviewed today and no changes are noted.    REVIEW OF SYSTEMS:  Constitutional: NEGATIVE for fever, chills, change in weight  Skin: NEGATIVE for worrisome rashes, moles or lesions  GI/: NEGATIVE for bowel or bladder changes  Neuro: NEGATIVE for weakness, dizziness or paresthesias    OBJECTIVE:  BP (!) 155/99   Ht 1.524 m (5')   Wt 81.6 kg (180 lb)   BMI 35.15 kg/m     General: healthy, alert and in no distress  HEENT: no scleral icterus or conjunctival erythema  Skin: no suspicious lesions or rash. No jaundice.  CV: regular rhythm by palpation, no pedal edema  Resp: normal respiratory effort without conversational dyspnea   Psych: normal mood and affect  Gait: normal steady gait with appropriate coordination and balance  Neuro: normal light touch sensory exam of the extremities.    MSK:  RIGHT KNEE  Inspection:    normal alignment  Palpation:    Tender about the lateral joint line and medial joint line. Remainder of  bony and ligamentous landmarks are nontender.    Mild effusion is present    Patellofemoral crepitus is Absent  Range of Motion:     00 extension to 1100 flexion  Strength:    Quadriceps grossly intact    Extensor mechanism intact  Special Tests:    Positive: none    Negative: MCL/valgus stress (0 & 30 deg), LCL/varus stress (0 & 30 deg), Lachman's, anterior drawer, posterior drawer, Delbert's    Independent visualization of the below image:    Large Joint Injection/Arthocentesis: R knee joint    Date/Time: 1/10/2024 12:57 PM    Performed by: Yeo, Albert, MD  Authorized by: Yeo, Albert, MD    Indications:  Pain and osteoarthritis  Needle Size:  22 G  Guidance: ultrasound    Approach:  Anterolateral  Location:  Knee      Medications:  48 mg hylan 48 MG/6ML; 5 mL lidocaine 1 %  Outcome:  Tolerated well, no immediate complications  Procedure discussed: discussed risks, benefits, and alternatives    Consent Given by:  Patient  Timeout: timeout called immediately prior to procedure    Prep: patient was prepped and draped in usual sterile fashion     Ultrasound was used to ensure safe and accurate needle placement and injection. Ultrasound images of the procedure were permanently stored.            Albert Yeo MD, [facility] [place] [facility]

## 2024-01-10 NOTE — PATIENT INSTRUCTIONS
1. Primary osteoarthritis of right knee      -Patient is following up for chronic right knee pain due to arthritis  -Patient tolerated right knee Synvisc 1 injection today without complications.  Patient was given postprocedure instructions  -Patient will follow-up when pain returns  -Call direct clinic number [[phone]] at any time with questions or concerns.    Albert Yeo MD [facility]  [facility] [place]

## 2024-01-10 NOTE — LETTER
1/10/2024         RE: Maeve Lovell  [address]  [place] [address]        Dear Colleague,    Thank you for referring your patient, Maeve Lovell, to the [facility] [place]. Please see a copy of my visit note below.    ASSESSMENT & PLAN  Patient Instructions     1. Primary osteoarthritis of right knee      -Patient is following up for chronic right knee pain due to arthritis  -Patient tolerated right knee Synvisc 1 injection today without complications.  Patient was given postprocedure instructions  -Patient will follow-up when pain returns  -Call direct clinic number [[phone]] at any time with questions or concerns.    Albert Yeo MD [facility] Orthopedics and Sports Medicine  [facility]        -----    SUBJECTIVE:  Maeve Lovell is a 63 year old female who is seen in follow-up for right knee pain.They were last seen for bilateral knees 8/7/23 . Patient states she had full improvement and that her pain was 100% gone until about a week or so ago.    Since their last visit reports 100% improvement. They indicate that their current pain level is 2/10. They have tried Tylenol.      The patient is seen by themselves.    Patient's past medical, surgical, social, and family histories were reviewed today and no changes are noted.    REVIEW OF SYSTEMS:  Constitutional: NEGATIVE for fever, chills, change in weight  Skin: NEGATIVE for worrisome rashes, moles or lesions  GI/: NEGATIVE for bowel or bladder changes  Neuro: NEGATIVE for weakness, dizziness or paresthesias    OBJECTIVE:  BP (!) 155/99   Ht 1.524 m (5')   Wt 81.6 kg (180 lb)   BMI 35.15 kg/m     General: healthy, alert and in no distress  HEENT: no scleral icterus or conjunctival erythema  Skin: no suspicious lesions or rash. No jaundice.  CV: regular rhythm by palpation, no pedal edema  Resp: normal respiratory effort without conversational dyspnea   Psych: normal mood and  affect  Gait: normal steady gait with appropriate coordination and balance  Neuro: normal light touch sensory exam of the extremities.    MSK:  RIGHT KNEE  Inspection:    normal alignment  Palpation:    Tender about the lateral joint line and medial joint line. Remainder of bony and ligamentous landmarks are nontender.    Mild effusion is present    Patellofemoral crepitus is Absent  Range of Motion:     00 extension to 1100 flexion  Strength:    Quadriceps grossly intact    Extensor mechanism intact  Special Tests:    Positive: none    Negative: MCL/valgus stress (0 & 30 deg), LCL/varus stress (0 & 30 deg), Lachman's, anterior drawer, posterior drawer, Delbert's    Independent visualization of the below image:    Large Joint Injection/Arthocentesis: R knee joint    Date/Time: 1/10/2024 12:57 PM    Performed by: Yeo, Albert, MD  Authorized by: Yeo, Albert, MD    Indications:  Pain and osteoarthritis  Needle Size:  22 G  Guidance: ultrasound    Approach:  Anterolateral  Location:  Knee      Medications:  48 mg hylan 48 MG/6ML; 5 mL lidocaine 1 %  Outcome:  Tolerated well, no immediate complications  Procedure discussed: discussed risks, benefits, and alternatives    Consent Given by:  Patient  Timeout: timeout called immediately prior to procedure    Prep: patient was prepped and draped in usual sterile fashion     Ultrasound was used to ensure safe and accurate needle placement and injection. Ultrasound images of the procedure were permanently stored.            Albert Yeo MD, [facility] [place] [facility]         Again, thank you for allowing me to participate in the care of your patient.        Sincerely,        Albert Yeo, MD

## 2024-02-27 ENCOUNTER — OFFICE VISIT (OUTPATIENT)
Dept: ORTHOPEDICS | Facility: CLINIC | Age: 64
End: 2024-02-27
Payer: MEDICARE

## 2024-02-27 VITALS
SYSTOLIC BLOOD PRESSURE: 152 MMHG | HEIGHT: 60 IN | WEIGHT: 180 LBS | BODY MASS INDEX: 35.34 KG/M2 | DIASTOLIC BLOOD PRESSURE: 89 MMHG

## 2024-02-27 DIAGNOSIS — M17.11 PRIMARY OSTEOARTHRITIS OF RIGHT KNEE: Primary | ICD-10-CM

## 2024-02-27 LAB
APPEARANCE FLD: ABNORMAL
CELL COUNT BODY FLUID SOURCE: ABNORMAL
COLOR FLD: YELLOW
CRYSTALS SNV MICRO: NORMAL
LYMPHOCYTES NFR FLD MANUAL: 7 %
MONOS+MACROS NFR FLD MANUAL: 6 %
NEUTS BAND NFR FLD MANUAL: 87 %
WBC # FLD AUTO: ABNORMAL /UL

## 2024-02-27 PROCEDURE — 87186 SC STD MICRODIL/AGAR DIL: CPT | Performed by: FAMILY MEDICINE

## 2024-02-27 PROCEDURE — 20611 DRAIN/INJ JOINT/BURSA W/US: CPT | Mod: RT | Performed by: FAMILY MEDICINE

## 2024-02-27 PROCEDURE — 87077 CULTURE AEROBIC IDENTIFY: CPT | Performed by: FAMILY MEDICINE

## 2024-02-27 PROCEDURE — 89051 BODY FLUID CELL COUNT: CPT | Performed by: FAMILY MEDICINE

## 2024-02-27 PROCEDURE — 87070 CULTURE OTHR SPECIMN AEROBIC: CPT | Performed by: FAMILY MEDICINE

## 2024-02-27 PROCEDURE — 89060 EXAM SYNOVIAL FLUID CRYSTALS: CPT | Performed by: FAMILY MEDICINE

## 2024-02-27 PROCEDURE — 87205 SMEAR GRAM STAIN: CPT | Performed by: FAMILY MEDICINE

## 2024-02-27 RX ORDER — METHYLPREDNISOLONE ACETATE 40 MG/ML
40 INJECTION, SUSPENSION INTRA-ARTICULAR; INTRALESIONAL; INTRAMUSCULAR; SOFT TISSUE
Status: SHIPPED | OUTPATIENT
Start: 2024-02-27

## 2024-02-27 RX ORDER — ROPIVACAINE HYDROCHLORIDE 5 MG/ML
4 INJECTION, SOLUTION EPIDURAL; INFILTRATION; PERINEURAL
Status: SHIPPED | OUTPATIENT
Start: 2024-02-27

## 2024-02-27 RX ORDER — LIDOCAINE HYDROCHLORIDE 10 MG/ML
5 INJECTION, SOLUTION INFILTRATION; PERINEURAL
Status: SHIPPED | OUTPATIENT
Start: 2024-02-27

## 2024-02-27 RX ADMIN — LIDOCAINE HYDROCHLORIDE 5 ML: 10 INJECTION, SOLUTION INFILTRATION; PERINEURAL at 14:04

## 2024-02-27 RX ADMIN — METHYLPREDNISOLONE ACETATE 40 MG: 40 INJECTION, SUSPENSION INTRA-ARTICULAR; INTRALESIONAL; INTRAMUSCULAR; SOFT TISSUE at 14:04

## 2024-02-27 RX ADMIN — ROPIVACAINE HYDROCHLORIDE 4 ML: 5 INJECTION, SOLUTION EPIDURAL; INFILTRATION; PERINEURAL at 14:04

## 2024-02-27 NOTE — PROGRESS NOTES
ASSESSMENT & PLAN  Patient Instructions     1. Primary osteoarthritis of right knee      -Patient is following up for chronic right knee pain and swelling due to arthritis  -Patient reports recurrence of pain and swelling approximately 2 weeks after her recent viscosupplementation injection  -X-rays taken from an outside center show moderate arthritis changes.  -Patient has a history of rheumatoid arthritis and is no longer responding to her Enbrel medication.  Patient's inflammatory condition may be contributing to her knee pain and swelling.  Synovial fluid was drained out of her right knee and sent off for cell count, crystals and infection.  Patient will see the results on her [employer] account.  Patient will send us a [employer] message when she sees her test results to discuss and determine next of treatment options  -If synovial fluid analysis is normal, to consider an MRI of her right knee to evaluate for intra-articular injuries  -Patient tolerated right knee intra-articular aspiration and cortisone injection today without complications.  Patient was given postprocedure instructions  -Call direct clinic number [[phone]] at any time with questions or concerns.    Albert Yeo MD [facility] Orthopedics and Sports Medicine  [facility]        -----    SUBJECTIVE:  Maeve Lovell is a 63 year old female who is seen in follow-up for right knee pain.They were last seen 01/10/2024.     Since their last visit reports worsening pain. They indicate that their current pain level is 8/10. They have tried rest/activity avoidance, ice, Tylenol, and viscosupplementation injection (most recent date: 01/08/2024) that provided about a month of relief. Patient reports that their knee is unstable. Patient also reports swelling as well as purple/dark spot that spread along the posterior aspect of their upper calf.     The patient is seen by themselves.    Patient's past medical, surgical, social, and  family histories were reviewed today and no changes are noted.    REVIEW OF SYSTEMS:  Constitutional: NEGATIVE for fever, chills, change in weight  Skin: NEGATIVE for worrisome rashes, moles or lesions  GI/: NEGATIVE for bowel or bladder changes  Neuro: NEGATIVE for weakness, dizziness or paresthesias    OBJECTIVE:  BP (!) 152/89   Ht 1.524 m (5')   Wt 81.6 kg (180 lb)   BMI 35.15 kg/m     General: healthy, alert and in no distress  HEENT: no scleral icterus or conjunctival erythema  Skin: no suspicious lesions or rash. No jaundice.  CV: regular rhythm by palpation, no pedal edema  Resp: normal respiratory effort without conversational dyspnea   Psych: normal mood and affect  Gait: normal steady gait with appropriate coordination and balance  Neuro: normal light touch sensory exam of the extremities.    MSK:  RIGHT KNEE  Inspection:    normal alignment  Palpation:    Tender about the lateral joint line and medial joint line. Remainder of bony and ligamentous landmarks are nontender.    Mod effusion is present    Patellofemoral crepitus is Absent  Range of Motion:     00 extension to 1000 flexion  Strength:    Quadriceps grossly intact    Extensor mechanism intact  Special Tests:    Positive: none    Negative: MCL/valgus stress (0 & 30 deg), LCL/varus stress (0 & 30 deg), Lachman's, anterior drawer, posterior drawer, Delbert's    Independent visualization of the below image:    Large Joint Injection/Arthocentesis: R knee joint    Date/Time: 2/27/2024 2:04 PM    Performed by: Yeo, Albert, MD  Authorized by: Yeo, Albert, MD    Indications:  Pain and osteoarthritis  Needle Size:  22 G  Guidance: ultrasound    Approach:  Anterolateral  Location:  Knee      Medications:  40 mg methylPREDNISolone 40 MG/ML; 5 mL lidocaine 1 %; 4 mL ROPivacaine 5 MG/ML  Aspirate amount (mL):  23  Aspirate:  Yellow and cloudy  Outcome:  Tolerated well, no immediate complications  Procedure discussed: discussed risks, benefits, and  alternatives    Consent Given by:  Patient  Timeout: timeout called immediately prior to procedure    Prep: patient was prepped and draped in usual sterile fashion     Ultrasound was used to ensure safe and accurate needle placement and injection. Ultrasound images of the procedure were permanently stored.          Albert Yeo MD, [facility]

## 2024-02-27 NOTE — PATIENT INSTRUCTIONS
1. Primary osteoarthritis of right knee      -Patient is following up for chronic right knee pain and swelling due to arthritis  -Patient reports recurrence of pain and swelling approximately 2 weeks after her recent viscosupplementation injection  -X-rays taken from an outside center show moderate arthritis changes.  -Patient has a history of rheumatoid arthritis and is no longer responding to her Enbrel medication.  Patient's inflammatory condition may be contributing to her knee pain and swelling.  Synovial fluid was drained out of her right knee and sent off for cell count, crystals and infection.  Patient will see the results on her [employer] account.  Patient will send us a [employer] message when she sees her test results to discuss and determine next of treatment options  -If synovial fluid analysis is normal, to consider an MRI of her right knee to evaluate for intra-articular injuries  -Patient tolerated right knee intra-articular aspiration and cortisone injection today without complications.  Patient was given postprocedure instructions  -Call direct clinic number [[phone]] at any time with questions or concerns.    Albert Yeo MD [facility]  [facility] [place]         Please call patient    If he still feels bad likely coincidental  As a multitude of viruses are going around.    Flu shots are not alive virus cannot calls the flu  Like they did in the 70s    But said sometimes it can cause aches  Chills for day  Usually very mild but symptoms would not be prolonged or have respiratory symptoms      If he has any severe illness  High fever chest pain shortness of breath or worsening symptoms urgent care ER    Hopefully he'll feel better so

## 2024-02-27 NOTE — LETTER
2/27/2024         RE: Maeve Lovell  [address]  [place] [address]        Dear Colleague,    Thank you for referring your patient, Maeve Lovell, to the [facility] [place]. Please see a copy of my visit note below.    ASSESSMENT & PLAN  Patient Instructions     1. Primary osteoarthritis of right knee      -Patient is following up for chronic right knee pain and swelling due to arthritis  -Patient reports recurrence of pain and swelling approximately 2 weeks after her recent viscosupplementation injection  -X-rays taken from an outside center show moderate arthritis changes.  -Patient has a history of rheumatoid arthritis and is no longer responding to her Enbrel medication.  Patient's inflammatory condition may be contributing to her knee pain and swelling.  Synovial fluid was drained out of her right knee and sent off for cell count, crystals and infection.  Patient will see the results on her [employer] account.  Patient will send us a [employer] message when she sees her test results to discuss and determine next of treatment options  -If synovial fluid analysis is normal, to consider an MRI of her right knee to evaluate for intra-articular injuries  -Patient tolerated right knee intra-articular aspiration and cortisone injection today without complications.  Patient was given postprocedure instructions  -Call direct clinic number [[phone]] at any time with questions or concerns.    Albert Yeo MD [facility] Orthopedics and Sports Medicine  [facility] [place]        -----    SUBJECTIVE:  Maeve Lovell is a 63 year old female who is seen in follow-up for right knee pain.They were last seen 01/10/2024.     Since their last visit reports worsening pain. They indicate that their current pain level is 8/10. They have tried rest/activity avoidance, ice, Tylenol, and viscosupplementation injection (most recent date: 01/08/2024) that provided about a  month of relief. Patient reports that their knee is unstable. Patient also reports swelling as well as purple/dark spot that spread along the posterior aspect of their upper calf.     The patient is seen by themselves.    Patient's past medical, surgical, social, and family histories were reviewed today and no changes are noted.    REVIEW OF SYSTEMS:  Constitutional: NEGATIVE for fever, chills, change in weight  Skin: NEGATIVE for worrisome rashes, moles or lesions  GI/: NEGATIVE for bowel or bladder changes  Neuro: NEGATIVE for weakness, dizziness or paresthesias    OBJECTIVE:  BP (!) 152/89   Ht 1.524 m (5')   Wt 81.6 kg (180 lb)   BMI 35.15 kg/m     General: healthy, alert and in no distress  HEENT: no scleral icterus or conjunctival erythema  Skin: no suspicious lesions or rash. No jaundice.  CV: regular rhythm by palpation, no pedal edema  Resp: normal respiratory effort without conversational dyspnea   Psych: normal mood and affect  Gait: normal steady gait with appropriate coordination and balance  Neuro: normal light touch sensory exam of the extremities.    MSK:  RIGHT KNEE  Inspection:    normal alignment  Palpation:    Tender about the lateral joint line and medial joint line. Remainder of bony and ligamentous landmarks are nontender.    Mod effusion is present    Patellofemoral crepitus is Absent  Range of Motion:     00 extension to 1000 flexion  Strength:    Quadriceps grossly intact    Extensor mechanism intact  Special Tests:    Positive: none    Negative: MCL/valgus stress (0 & 30 deg), LCL/varus stress (0 & 30 deg), Lachman's, anterior drawer, posterior drawer, Delbert's    Independent visualization of the below image:    Large Joint Injection/Arthocentesis: R knee joint    Date/Time: 2/27/2024 2:04 PM    Performed by: Yeo, Albert, MD  Authorized by: Yeo, Albert, MD    Indications:  Pain and osteoarthritis  Needle Size:  22 G  Guidance: ultrasound    Approach:  Anterolateral  Location:   Knee      Medications:  40 mg methylPREDNISolone 40 MG/ML; 5 mL lidocaine 1 %; 4 mL ROPivacaine 5 MG/ML  Aspirate amount (mL):  23  Aspirate:  Yellow and cloudy  Outcome:  Tolerated well, no immediate complications  Procedure discussed: discussed risks, benefits, and alternatives    Consent Given by:  Patient  Timeout: timeout called immediately prior to procedure    Prep: patient was prepped and draped in usual sterile fashion     Ultrasound was used to ensure safe and accurate needle placement and injection. Ultrasound images of the procedure were permanently stored.          Albert Yeo MD, [facility]      Again, thank you for allowing me to participate in the care of your patient.        Sincerely,        Albert Yeo, MD

## 2024-02-28 ENCOUNTER — MYC MEDICAL ADVICE (OUTPATIENT)
Dept: ORTHOPEDICS | Facility: CLINIC | Age: 64
End: 2024-02-28
Payer: MEDICARE

## 2024-02-28 DIAGNOSIS — D72.828 OTHER ELEVATED WHITE BLOOD CELL COUNT: Primary | ICD-10-CM

## 2024-03-03 LAB
BACTERIA SNV CULT: ABNORMAL
GRAM STAIN RESULT: ABNORMAL
GRAM STAIN RESULT: ABNORMAL

## 2024-03-04 ENCOUNTER — TELEPHONE (OUTPATIENT)
Dept: ORTHOPEDICS | Facility: CLINIC | Age: 64
End: 2024-03-04
Payer: MEDICARE

## 2024-03-04 NOTE — TELEPHONE ENCOUNTER
Patient returned call regarding lab results (see new telephone encounter dated 3/4). Upon chart review, Dr. Yeo sent patient [employer] message on 2/28 regarding results. Patient responded to [employer] message requesting to proceed with MRI as recommended by Dr. Yeo.     Order pending. Please advise.    Susanna Khan ATC

## 2024-03-04 NOTE — TELEPHONE ENCOUNTER
[facility] Call Center    Phone Message    May a detailed message be left on voicemail: yes     Reason for Call: Patient returning Doctor Call from Friday about Blood Work . Please call Patient     Action Taken: Message routed to:  [facility] ([facility]): [facility] sports medicine Doctors    Travel Screening: Not Applicable

## 2024-03-04 NOTE — TELEPHONE ENCOUNTER
Called patient to discuss synovial fluid results which shows an isolated staph epidermis in broth only of her synovial culture.  Patient has a slightly elevated white count and a total nucleated cell count of 13,000 which is not consistent with a septic joint.  Patient states that since her aspiration and injection she is actually feeling much better in terms of pain.  Patient denies any fevers, chills, malaise, erythema, warmth, swelling or pain in her knee.  The bacteria is likely a contaminant since the rest of her analysis is not consistent with an infection nor is her clinical picture.  Patient will schedule a nursing visit to have a CBC drawn tomorrow.  Patient does have an elevated white count from her recent CBC 2 months ago.  If her current white count is trending higher, we will continue further workup for potential source of infection.  If her white count is stable or improving, will monitor clinically.  All questions were answered.  Patient is in agreement with this plan.

## 2024-03-04 NOTE — TELEPHONE ENCOUNTER
Duplicate encounter. Please see [employer] message dated 2/28 for further documentation. This call will be documented in that encounter.    Susanna Khan ATC

## 2024-03-04 NOTE — TELEPHONE ENCOUNTER
[facility] Call Center    Phone Message    May a detailed message be left on voicemail: yes     Reason for Call: Other: Pt calling back again requesting a call back to go over results      Action Taken: Other: bu sports med    Travel Screening: Not Applicable

## 2024-03-05 ENCOUNTER — LAB (OUTPATIENT)
Dept: LAB | Facility: CLINIC | Age: 64
End: 2024-03-05
Payer: MEDICARE

## 2024-03-05 DIAGNOSIS — D72.828 OTHER ELEVATED WHITE BLOOD CELL COUNT: ICD-10-CM

## 2024-03-05 LAB
BASOPHILS # BLD AUTO: 0 10E3/UL (ref 0–0.2)
BASOPHILS NFR BLD AUTO: 0 %
EOSINOPHIL # BLD AUTO: 0.1 10E3/UL (ref 0–0.7)
EOSINOPHIL NFR BLD AUTO: 1 %
ERYTHROCYTE [DISTWIDTH] IN BLOOD BY AUTOMATED COUNT: 15.1 % (ref 10–15)
HCT VFR BLD AUTO: 36.1 % (ref 35–47)
HGB BLD-MCNC: 10.9 G/DL (ref 11.7–15.7)
IMM GRANULOCYTES # BLD: 0 10E3/UL
IMM GRANULOCYTES NFR BLD: 0 %
LYMPHOCYTES # BLD AUTO: 1.9 10E3/UL (ref 0.8–5.3)
LYMPHOCYTES NFR BLD AUTO: 16 %
MCH RBC QN AUTO: 25.8 PG (ref 26.5–33)
MCHC RBC AUTO-ENTMCNC: 30.2 G/DL (ref 31.5–36.5)
MCV RBC AUTO: 85 FL (ref 78–100)
MONOCYTES # BLD AUTO: 0.7 10E3/UL (ref 0–1.3)
MONOCYTES NFR BLD AUTO: 6 %
NEUTROPHILS # BLD AUTO: 8.7 10E3/UL (ref 1.6–8.3)
NEUTROPHILS NFR BLD AUTO: 76 %
PLATELET # BLD AUTO: 367 10E3/UL (ref 150–450)
RBC # BLD AUTO: 4.23 10E6/UL (ref 3.8–5.2)
WBC # BLD AUTO: 11.3 10E3/UL (ref 4–11)

## 2024-03-05 PROCEDURE — 36415 COLL VENOUS BLD VENIPUNCTURE: CPT

## 2024-03-05 PROCEDURE — 85025 COMPLETE CBC W/AUTO DIFF WBC: CPT

## 2024-03-06 NOTE — TELEPHONE ENCOUNTER
Upon chart review, provider's [employer] message was read by patient. Closing encounter.     Tamra Silverio MSA, ATC  Certified Athletic Trainer

## 2024-03-30 ENCOUNTER — HOSPITAL ENCOUNTER (OUTPATIENT)
Dept: MRI IMAGING | Facility: CLINIC | Age: 64
Discharge: HOME | End: 2024-03-30
Attending: FAMILY MEDICINE | Admitting: FAMILY MEDICINE
Payer: MEDICARE

## 2024-03-30 DIAGNOSIS — M17.11 PRIMARY OSTEOARTHRITIS OF RIGHT KNEE: ICD-10-CM

## 2024-03-30 PROCEDURE — 73721 MRI JNT OF LWR EXTRE W/O DYE: CPT | Mod: RT,MG

## 2024-04-02 ENCOUNTER — OFFICE VISIT (OUTPATIENT)
Dept: ORTHOPEDICS | Facility: CLINIC | Age: 64
End: 2024-04-02
Payer: MEDICARE

## 2024-04-02 VITALS
BODY MASS INDEX: 35.34 KG/M2 | WEIGHT: 180 LBS | HEIGHT: 60 IN | DIASTOLIC BLOOD PRESSURE: 89 MMHG | SYSTOLIC BLOOD PRESSURE: 139 MMHG

## 2024-04-02 DIAGNOSIS — M17.11 PRIMARY OSTEOARTHRITIS OF RIGHT KNEE: Primary | ICD-10-CM

## 2024-04-02 PROCEDURE — 99214 OFFICE O/P EST MOD 30 MIN: CPT | Performed by: FAMILY MEDICINE

## 2024-04-02 RX ORDER — ABATACEPT 125 MG/ML
125 INJECTION, SOLUTION SUBCUTANEOUS WEEKLY
COMMUNITY
Start: 2024-02-20

## 2024-04-02 NOTE — PATIENT INSTRUCTIONS
1. Primary osteoarthritis of right knee      -Patient is following up for chronic right knee pain and swelling due to arthritis  -MRI results of the right knee were reviewed today which showed significant cartilage degeneration in all 3 compartments without synovitis.  All questions were answered regarding MRI results  -Patient has had very short-term relief with oral and injectable cortisone.  Patient also had no response to viscosupplementation injections  -Patient referred to orthopedics to discuss potential surgical treatment options  -Call direct clinic number [[phone]] at any time with questions or concerns.    Albert Yeo MD [facility]  [facility] [place]

## 2024-04-02 NOTE — PROGRESS NOTES
ASSESSMENT & PLAN  Patient Instructions     1. Primary osteoarthritis of right knee      -Patient is following up for chronic right knee pain and swelling due to arthritis  -MRI results of the right knee were reviewed today which showed significant cartilage degeneration in all 3 compartments without synovitis.  All questions were answered regarding MRI results  -Patient has had very short-term relief with oral and injectable cortisone.  Patient also had no response to viscosupplementation injections  -Patient referred to orthopedics to discuss potential surgical treatment options  -Call direct clinic number [[phone]] at any time with questions or concerns.    Albert Yeo MD [facility] Orthopedics and Sports Medicine  [facility]        -----    SUBJECTIVE:  Maeve Lovlel is a 63 year old female who is seen in follow-up for right knee pain.They were last seen 2/27/2024 in which they received a right knee steroid injection.     Since their last visit reports 0% - (About the same as last time). They indicate that their current pain level is 4/10. They have tried rest/activity avoidance, ice, heat, Tylenol, other medications: Prednisone, and corticosteroid injection (most recent date: 02/27/2024) that provided  1 week(s) of relief.      The patient is seen by themselves.    Patient's past medical, surgical, social, and family histories were reviewed today and no changes are noted.    REVIEW OF SYSTEMS:  Constitutional: NEGATIVE for fever, chills, change in weight  Skin: NEGATIVE for worrisome rashes, moles or lesions  GI/: NEGATIVE for bowel or bladder changes  Neuro: NEGATIVE for weakness, dizziness or paresthesias    OBJECTIVE:  /89   Ht 1.524 m (5')   Wt 81.6 kg (180 lb)   BMI 35.15 kg/m     General: healthy, alert and in no distress  HEENT: no scleral icterus or conjunctival erythema  Skin: no suspicious lesions or rash. No jaundice.  CV: regular rhythm by palpation, no  pedal edema  Resp: normal respiratory effort without conversational dyspnea   Psych: normal mood and affect  Gait: normal steady gait with appropriate coordination and balance  Neuro: normal light touch sensory exam of the extremities.    MSK:  RIGHT KNEE  Inspection:    normal alignment  Palpation:    Tender about the lateral joint line and medial joint line. Remainder of bony and ligamentous landmarks are nontender.    Mod effusion is present    Patellofemoral crepitus is Absent  Range of Motion:     00 extension to 1000 flexion  Strength:    Quadriceps grossly intact    Extensor mechanism intact  Special Tests:    Positive: none    Negative: MCL/valgus stress (0 & 30 deg), LCL/varus stress (0 & 30 deg), Lachman's, anterior drawer, posterior drawer, Delbert's    Independent visualization of the below image:  Results for orders placed or performed during the hospital encounter of 03/30/24   MR Knee Right w/o Contrast    Narrative    EXAM: MR KNEE RIGHT W/O CONTRAST  LOCATION: [facility]  DATE: 3/30/2024    INDICATION: Recurrent knee pain and swelling. Very short term relief with cortisone injection and aspirations. Eval for OA vs synovitis.  COMPARISON: None.  TECHNIQUE: Unenhanced.    FINDINGS:    MEDIAL COMPARTMENT:   -Meniscus: There is a small amount of free edge fraying and tearing in the body. The body is extruded medially from the knee joint.  -Cartilage: Full-thickness cartilage loss throughout the weightbearing surface of the medial femoral condyle, extending slightly into the posterior nonweightbearing surface. There is a moderate amount of grade II chondromalacia in the posterior   nonweightbearing surface. There is a moderate amount of full-thickness chondromalacia in the medial tibial plateau and there is grade III chondromalacia elsewhere in the medial tibial plateau. Moderate subchondral marrow edema in the medial femoral   condyle weightbearing surface.    LATERAL  COMPARTMENT:  -Meniscus: There is a small free edge tear in the body.   -Cartilage: Small area of full-thickness chondromalacia in the weightbearing surface of the lateral femoral condyle. Moderate amount of grade III and IV chondromalacia of the medial tibial plateau.    PATELLOFEMORAL COMPARTMENT:   -Alignment: Patella midline. No subluxation or tilting.   -Cartilage: Moderate amount of full-thickness chondromalacia of the patella and trochlea.    CRUCIATE LIGAMENTS:   -ACL: Normal.  -PCL: Normal.    COLLATERAL LIGAMENTS:   -Medial collateral ligament: Superficial and deep fibers are normal.  -Lateral collateral ligament: Normal.    POSTEROMEDIAL CORNER:  -Distal semimembranosus tendon is normal.   -Pes anserine tendons are normal. Posteromedial corner complex ligaments are intact.    POSTEROLATERAL CORNER:   -Popliteal tendon is intact. No tendinopathy.  -Biceps femoris tendon and posterolateral corner complex ligaments are intact.    EXTENSOR MECHANISM:   -Quadriceps tendon: Normal.  -Patellar tendon: Normal.  -Patellofemoral ligaments and retinacula: Intact.    JOINT:   -Moderate effusion with debris/hemorrhage or synovitis in the joint diffusely. No discrete well marginated intra-articular body.    BONES:  -No fracture or concerning marrow replacing lesion.    SOFT TISSUES:   -Moderate-sized popliteal cyst containing hemorrhage/debris or synovitis.       Impression    IMPRESSION:  1.  Small amount of free edge fraying and tearing in the body of the medial meniscus. The body of the medial meniscus is extruded medially from the knee joint. 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  2.  Small free edge tear in the body of the lateral meniscus. Small area of full-thickness chondromalacia in the lateral femoral condyle. Moderate amount of grade III and IV chondromalacia of the medial  tibial plateau.  3.  Moderate amount of full-thickness chondromalacia in the patella and trochlea.  4.  Moderate knee joint effusion and popliteal cyst with debris/hemorrhage or synovitis in the joint and popliteal cyst diffusely.           Albert Yeo MD, [facility] and Orthopedic Care

## 2024-04-02 NOTE — LETTER
4/2/2024         RE: Maeve Lovell  [address]  [place] [address]        Dear Colleague,    Thank you for referring your patient, Maeve Lovell, to the [facility] [place]. Please see a copy of my visit note below.    ASSESSMENT & PLAN  Patient Instructions     1. Primary osteoarthritis of right knee      -Patient is following up for chronic right knee pain and swelling due to arthritis  -MRI results of the right knee were reviewed today which showed significant cartilage degeneration in all 3 compartments without synovitis.  All questions were answered regarding MRI results  -Patient has had very short-term relief with oral and injectable cortisone.  Patient also had no response to viscosupplementation injections  -Patient referred to orthopedics to discuss potential surgical treatment options  -Call direct clinic number [[phone]] at any time with questions or concerns.    Albert Yeo MD [facility]  [facility]        -----    SUBJECTIVE:  Maeve Lovell is a 63 year old female who is seen in follow-up for right knee pain.They were last seen 2/27/2024 in which they received a right knee steroid injection.     Since their last visit reports 0% - (About the same as last time). They indicate that their current pain level is 4/10. They have tried rest/activity avoidance, ice, heat, Tylenol, other medications: Prednisone, and corticosteroid injection (most recent date: 02/27/2024) that provided  1 week(s) of relief.      The patient is seen by themselves.    Patient's past medical, surgical, social, and family histories were reviewed today and no changes are noted.    REVIEW OF SYSTEMS:  Constitutional: NEGATIVE for fever, chills, change in weight  Skin: NEGATIVE for worrisome rashes, moles or lesions  GI/: NEGATIVE for bowel or bladder changes  Neuro: NEGATIVE for weakness, dizziness or  paresthesias    OBJECTIVE:  /89   Ht 1.524 m (5')   Wt 81.6 kg (180 lb)   BMI 35.15 kg/m     General: healthy, alert and in no distress  HEENT: no scleral icterus or conjunctival erythema  Skin: no suspicious lesions or rash. No jaundice.  CV: regular rhythm by palpation, no pedal edema  Resp: normal respiratory effort without conversational dyspnea   Psych: normal mood and affect  Gait: normal steady gait with appropriate coordination and balance  Neuro: normal light touch sensory exam of the extremities.    MSK:  RIGHT KNEE  Inspection:    normal alignment  Palpation:    Tender about the lateral joint line and medial joint line. Remainder of bony and ligamentous landmarks are nontender.    Mod effusion is present    Patellofemoral crepitus is Absent  Range of Motion:     00 extension to 1000 flexion  Strength:    Quadriceps grossly intact    Extensor mechanism intact  Special Tests:    Positive: none    Negative: MCL/valgus stress (0 & 30 deg), LCL/varus stress (0 & 30 deg), Lachman's, anterior drawer, posterior drawer, Delbert's    Independent visualization of the below image:  Results for orders placed or performed during the hospital encounter of 03/30/24   MR Knee Right w/o Contrast    Narrative    EXAM: MR KNEE RIGHT W/O CONTRAST  LOCATION: [facility]  DATE: 3/30/2024    INDICATION: Recurrent knee pain and swelling. Very short term relief with cortisone injection and aspirations. Eval for OA vs synovitis.  COMPARISON: None.  TECHNIQUE: Unenhanced.    FINDINGS:    MEDIAL COMPARTMENT:   -Meniscus: There is a small amount of free edge fraying and tearing in the body. The body is extruded medially from the knee joint.  -Cartilage: Full-thickness cartilage loss throughout the weightbearing surface of the medial femoral condyle, extending slightly into the posterior nonweightbearing surface. There is a moderate amount of grade II chondromalacia in the posterior   nonweightbearing  surface. There is a moderate amount of full-thickness chondromalacia in the medial tibial plateau and there is grade III chondromalacia elsewhere in the medial tibial plateau. Moderate subchondral marrow edema in the medial femoral   condyle weightbearing surface.    LATERAL COMPARTMENT:  -Meniscus: There is a small free edge tear in the body.   -Cartilage: Small area of full-thickness chondromalacia in the weightbearing surface of the lateral femoral condyle. Moderate amount of grade III and IV chondromalacia of the medial tibial plateau.    PATELLOFEMORAL COMPARTMENT:   -Alignment: Patella midline. No subluxation or tilting.   -Cartilage: Moderate amount of full-thickness chondromalacia of the patella and trochlea.    CRUCIATE LIGAMENTS:   -ACL: Normal.  -PCL: Normal.    COLLATERAL LIGAMENTS:   -Medial collateral ligament: Superficial and deep fibers are normal.  -Lateral collateral ligament: Normal.    POSTEROMEDIAL CORNER:  -Distal semimembranosus tendon is normal.   -Pes anserine tendons are normal. Posteromedial corner complex ligaments are intact.    POSTEROLATERAL CORNER:   -Popliteal tendon is intact. No tendinopathy.  -Biceps femoris tendon and posterolateral corner complex ligaments are intact.    EXTENSOR MECHANISM:   -Quadriceps tendon: Normal.  -Patellar tendon: Normal.  -Patellofemoral ligaments and retinacula: Intact.    JOINT:   -Moderate effusion with debris/hemorrhage or synovitis in the joint diffusely. No discrete well marginated intra-articular body.    BONES:  -No fracture or concerning marrow replacing lesion.    SOFT TISSUES:   -Moderate-sized popliteal cyst containing hemorrhage/debris or synovitis.       Impression    IMPRESSION:  1.  Small amount of free edge fraying and tearing in the body of the medial meniscus. The body of the medial meniscus is extruded medially from the knee joint. There is full-thickness chondromalacia in the medial femoral condyle and medial tibial plateau   and  there is moderate subchondral marrow edema in the medial femoral condyle.  2.  Small free edge tear in the body of the lateral meniscus. Small area of full-thickness chondromalacia in the lateral femoral condyle. Moderate amount of grade III and IV chondromalacia of the medial tibial plateau.  3.  Moderate amount of full-thickness chondromalacia in the patella and trochlea.  4.  Moderate knee joint effusion and popliteal cyst with debris/hemorrhage or synovitis in the joint and popliteal cyst diffusely.           Albert Yeo MD, [facility]      Again, thank you for allowing me to participate in the care of your patient.        Sincerely,        Albert Yeo, MD

## 2024-04-04 ENCOUNTER — ANCILLARY PROCEDURE (OUTPATIENT)
Dept: GENERAL RADIOLOGY | Facility: CLINIC | Age: 64
End: 2024-04-04
Attending: STUDENT IN AN ORGANIZED HEALTH CARE EDUCATION/TRAINING PROGRAM
Payer: MEDICARE

## 2024-04-04 ENCOUNTER — OFFICE VISIT (OUTPATIENT)
Dept: ORTHOPEDICS | Facility: CLINIC | Age: 64
End: 2024-04-04
Attending: FAMILY MEDICINE
Payer: MEDICARE

## 2024-04-04 VITALS — HEIGHT: 60 IN | BODY MASS INDEX: 35.34 KG/M2 | WEIGHT: 180 LBS

## 2024-04-04 DIAGNOSIS — M17.11 PRIMARY OSTEOARTHRITIS OF RIGHT KNEE: ICD-10-CM

## 2024-04-04 PROCEDURE — 99204 OFFICE O/P NEW MOD 45 MIN: CPT | Performed by: STUDENT IN AN ORGANIZED HEALTH CARE EDUCATION/TRAINING PROGRAM

## 2024-04-04 PROCEDURE — 73564 X-RAY EXAM KNEE 4 OR MORE: CPT | Mod: TC | Performed by: RADIOLOGY

## 2024-04-04 NOTE — PROGRESS NOTES
CC: Right Knee Pain    HPI: Patient is a 63-year-old female presents here today in consultation for her right knee pain.  She has previously been seen by my colleague, Dr.Yeo.  She has a past medical history of rheumatoid arthritis.  She states that she typically gets pain from her rheumatoid in her hands.  Over the last year and a half she has noted increasing pain in her right knee.  She localizes the pain to the anterior aspect of her knee.  She also gets shooting pain up and down her leg.  She has pain when attempting to ambulate more than 10 to 15 minutes.  This caused her to have to sit down to relieve her pain.  She is unable to take NSAIDs due to ulcerative colitis.  She takes Tylenol as needed for the pain.  She is also been on oral prednisone for her joint pain.  She states that she is taking 10 to 15 mg/day of prednisone.  She has had multiple injections into the knee in the past.  She has had both hyaluronic acid and corticosteroid injections.  She gets short, but complete relief from these injections.  Her most recent injection was in February 2024.  She states she got approximately 1 week of complete relief from this injection.  An aspiration of the joint fluid was also performed at that time and sent for cell count and culture.  Cell count was 13,000 with 87% neutrophils.  Her cultures did end up growing staph epi.  However, given the low cell count, complete resolution of symptoms with the steroid injection, and low clinical suspicion for septic arthritis, this was determined to be a false positive by the clinician.  I do agree with this.  She has not been on any antibiotics.  She continues to be on her baseline is showing no signs of septic arthritis.  She has not had any surgery on the right knee.  She has undergone physical therapy at an outside facility in the past for her lower back and legs    She has a history of rheumatoid arthritis.  She currently gets weekly injections of abatacept for  rheumatoid arthritis.  She reports to taking 10 to 15 mg p.o. daily of prednisone for joint pain.  She follows with [facility]. Me[facility] in [place]. she also has a history of ulcerative colitis with 3 bowel surgeries in 2023, ultimately ending with her having a J-pouch.         Patient Active Problem List   Diagnosis    Rheumatoid arthritis involving multiple sites with positive rheumatoid factor (H)    Ulcerative pancolitis without complication (H)    Osteoporosis, unspecified osteoporosis type, unspecified pathological fracture presence    Lichen sclerosus    Benign neoplasm of cecum    Cholelithiasis    Chronic rhinitis    Colon polyp    Diverticulosis of large intestine    Foot drop, left    Hallux valgus, acquired    Hematochezia    Hemorrhoids, external    Hemorrhoids    Morbid obesity (H)    Primary osteoarthritis of right knee          Past Medical History:   Diagnosis Date    Lichen sclerosus of female genitalia     RA (rheumatoid arthritis) (H)     Ulcerative colitis (H)         No past surgical history on file.       Current Outpatient Medications   Medication Sig Dispense Refill    clobetasol (TEMOVATE) 0.05 % external ointment Use a pea sized amount one to two times weekly as needed  Indications: Inflammation, lichen sclerosus      diphenoxylate-atropine (LOMOTIL) 2.5-0.025 MG tablet TAKE 2 TABLETS BY MOUTH FOUR TIMES DAILY      etanercept (ENBREL SURECLICK) 50 MG/ML autoinjector Inject 50 mg Subcutaneous every 7 days      fluconazole (DIFLUCAN) 150 MG tablet Take 1 tablet (150 mg) by mouth once a week (Patient not taking: Reported on 4/10/2023) 2 tablet 0    fluconazole (DIFLUCAN) 200 MG tablet Take 1 tablet (200 mg) by mouth once a week (Patient not taking: Reported on 4/10/2023) 4 tablet 0    hydroxychloroquine (PLAQUENIL) 200 MG tablet Take 2 tablets by mouth daily at 2 pm      ketoconazole (NIZORAL) 2 % external cream Apply topically 2 times daily Apply to affected areas on torso twice  daily 120 g 1    loperamide (IMODIUM A-D) 2 MG capsule taking 8 capsules per day      omeprazole (PRILOSEC) 20 MG DR capsule Take 1 capsule by mouth every 24 hours      ONE DAILY MULTIPLE VITAMIN OR       ORENCIA CLICKJECT 125 MG/ML SOAJ auto-injector Inject 125 mg Subcutaneous once a week      predniSONE (DELTASONE) 5 MG tablet take 2-3 tabs PO qday PRN and taper as directed      valACYclovir (VALTREX) 500 MG tablet Take 500 mg by mouth 2 times daily            Allergies   Allergen Reactions    Iodinated Contrast Media Hives     angioneuritic edema of face and throat    Alcohol Hives    Ethanol         No family history on file.       Social History     Tobacco Use    Smoking status: Never    Smokeless tobacco: Never   Substance Use Topics    Alcohol use: Not Currently            Objective:  Physical Exam:  RLE: No pain with axial load or logroll of the hip.  No open wounds, lacerations, or prior surgical incisions about the knee.  No significant edema or ecchymosis.  No erythema or drainage.  No effusion of the knee joint. Mild pain to palpation over the quad tendon patella or patellar tendon.  No pain to palpation over the lateral joint line.   Mild pain to palpation over the medial joint line.  Patellar grind test positive for crepitus and pain.  No pain to palpation over the femoral origin or tibial insertion of the MCL.  No pain to palpation over the femoral origin or fibular insertion of the LCL.  Passive range of motion 0 to 140 degrees of knee flexion.  Active range of motion is equivalent to passive range of motion.  5/5 strength in flexion and extension.  Stable to varus and valgus stress at 0 and 30 degrees of knee flexion with firm endpoint.  Negative Lachman.  Stable anterior posterior drawer.  Grossly neurovascular intact.    Imaging:  X-ray of the right knee from today was reviewed, including weightbearing films.  This shows severe narrowing with near complete loss of the medial joint space in the  right knee with osteophyte formation of the femoral condyle and tibial plateau.  There is slight narrowing of the lateral compartment, but overall well-preserved joint space in the lateral compartment.  Patellofemoral sunrise views show significant narrowing of the medial and lateral patellofemoral joint space.    MRI without contrast of the right knee from March 30, 2024 was reviewed.  This is consistent with tricompartmental osteoarthritis.  There is complete loss of the cartilage from the medial femoral condyle and tibial plateau.  There is also areas of grade III and IV chondromalacia of the lateral tibial plateau.  There are areas of full-thickness cartilage loss of the patella and trochlea.  Moderate joint effusion.    Assessment and Plan: Patient is a 63-year-old female with a past medical history of rheumatoid arthritis who presents here today with right knee tricompartmental osteoarthritis. I reviewed her images with her today.  We reviewed the diagnosis, natural history, and prognosis.  We discussed both operative and nonoperative treatment options.  We discussed nonoperative management in the form of low impact aerobic exercise, weight loss, oral anti-inflammatory pain medication, bracing, physical therapy, and intra-articular corticosteroid and hyaluronic acid injections.  We also discussed operative intervention in the form of total knee arthroplasty.  We reviewed the risks and benefits including but not limited to bleeding, infection requiring revision surgery, loss of range of motion, loss of function, damage surrounding nerves and blood vessels, failure to cure pain, loss of limb and loss of life.  We also discussed the expected postoperative course after a total knee arthroplasty.  I had the opportunity to answer her questions at this time.     At this time, she feels that she has trialed and exhausted nonoperative management in the form of activity modification, oral anti-inflammatory medication  and prednisone, and multiple rounds of corticosteroid injections.  She is interested in moving forward with a right total knee arthroplasty in the near future.  I discussed with her that currently there are several risk factors that place her at a increased risk of joint infection after surgery.  First, she underwent an intra-articular injection on February 27.  Discussed with her that I would not perform a total knee arthroplasty within 3 months of an injection due to the increased risk of infection.  Her aspiration at that time did grow staph epi on culture.  I agree with Dr. Yeo, that this is a false positive contaminant, as her white cell count was 13,000, she received 1 week of complete symptomatic relief finasteride injection, and clinically she has returned to her baseline without any operative intervention or antibiotic treatment.    Additionally, she is taking 10 to 15 mg of p.o. prednisone for her right knee pain.  I would ideally like to see her wean off of her prednisone to surgery to decrease her risk of wound healing complications and infections.  Additionally, she is taking weekly injections of abatacept, a T-cell modulator. Per the 2022 [employer] and AAKS antirheumatologic medications prior to total joint arthroplasty, she will likely have to skip 1 or 2 weeks of her abatacept infusions in order to have her surgery 2 weeks after her last infusion, and ideally taper off of her prednisone to decrease her risk of infection.  I would like her to discuss this further with her rheumatologist.  She did inquire about medications that could help with her right knee pain as she tapers from her prednisone.  She is currently taking Tylenol.  She cannot take NSAIDs due to her J-pouch.  I think gabapentin could be helpful medication for her.  She has been inquired to GI if this is a safe medication for her.    She is going to discuss optimizing her medication for potential right total knee  arthroplasty with her rheumatologist and medical doctor.  I would a follow-up with her in 6 to 8 weeks to further discuss planning of a right total knee arthroplasty this summer or fall.    Keon Thomas MD    [facility]   Department of Orthopedic Surgery      Disclaimer: This note consists of symbols derived from keyboarding, dictation and/or voice recognition software. As a result, there may be errors in the script that have gone undetected. Please consider this when interpreting information found in this chart.

## 2024-04-04 NOTE — PATIENT INSTRUCTIONS
[facility]   [id number] [place] [address], [address]  [place] [address] [address] [place] [address]   Appointments: [phone] Appointments: [phone]   Fax: [phone] Fax: [phone]       1. Primary osteoarthritis of right knee      Ask rheumatologist about  weaning prednisone     Talk to Rheumatologist Orencia dosing around elective total joint surgery.     Ask Gi doctor if it is okay to gabapentin   Follow up with Dr. Thomas in 3-4 weeks     Call my office with any questions or concerns, [phone].

## 2024-04-04 NOTE — LETTER
4/4/2024         RE: Maeve Lovell  [address]  [place] [address]        Dear Colleague,    Thank you for referring your patient, Maeve Lovell, to the [facility]. Please see a copy of my visit note below.    CC: Right Knee Pain    HPI: Patient is a 63-year-old female presents here today in consultation for her right knee pain.  She has previously been seen by my colleague, Dr.Yeo.  She has a past medical history of rheumatoid arthritis.  She states that she typically gets pain from her rheumatoid in her hands.  Over the last year and a half she has noted increasing pain in her right knee.  She localizes the pain to the anterior aspect of her knee.  She also gets shooting pain up and down her leg.  She has pain when attempting to ambulate more than 10 to 15 minutes.  This caused her to have to sit down to relieve her pain.  She is unable to take NSAIDs due to ulcerative colitis.  She takes Tylenol as needed for the pain.  She is also been on oral prednisone for her joint pain.  She states that she is taking 10 to 15 mg/day of prednisone.  She has had multiple injections into the knee in the past.  She has had both hyaluronic acid and corticosteroid injections.  She gets short, but complete relief from these injections.  Her most recent injection was in February 2024.  She states she got approximately 1 week of complete relief from this injection.  An aspiration of the joint fluid was also performed at that time and sent for cell count and culture.  Cell count was 13,000 with 87% neutrophils.  Her cultures did end up growing staph epi.  However, given the low cell count, complete resolution of symptoms with the steroid injection, and low clinical suspicion for septic arthritis, this was determined to be a false positive by the clinician.  I do agree with this.  She has not been on any antibiotics.  She continues to be on her baseline is showing no signs of septic  arthritis.  She has not had any surgery on the right knee.  She has undergone physical therapy at an outside facility in the past for her lower back and legs    She has a history of rheumatoid arthritis.  She currently gets weekly injections of abatacept for rheumatoid arthritis.  She reports to taking 10 to 15 mg p.o. daily of prednisone for joint pain.  She follows with [facility] Me[facility] at [facility] in [place]. she also has a history of ulcerative colitis with 3 bowel surgeries in 2023, ultimately ending with her having a J-pouch.         Patient Active Problem List   Diagnosis     Rheumatoid arthritis involving multiple sites with positive rheumatoid factor (H)     Ulcerative pancolitis without complication (H)     Osteoporosis, unspecified osteoporosis type, unspecified pathological fracture presence     Lichen sclerosus     Benign neoplasm of cecum     Cholelithiasis     Chronic rhinitis     Colon polyp     Diverticulosis of large intestine     Foot drop, left     Hallux valgus, acquired     Hematochezia     Hemorrhoids, external     Hemorrhoids     Morbid obesity (H)     Primary osteoarthritis of right knee          Past Medical History:   Diagnosis Date     Lichen sclerosus of female genitalia      RA (rheumatoid arthritis) (H)      Ulcerative colitis (H)         No past surgical history on file.       Current Outpatient Medications   Medication Sig Dispense Refill     clobetasol (TEMOVATE) 0.05 % external ointment Use a pea sized amount one to two times weekly as needed  Indications: Inflammation, lichen sclerosus       diphenoxylate-atropine (LOMOTIL) 2.5-0.025 MG tablet TAKE 2 TABLETS BY MOUTH FOUR TIMES DAILY       etanercept (ENBREL SURECLICK) 50 MG/ML autoinjector Inject 50 mg Subcutaneous every 7 days       fluconazole (DIFLUCAN) 150 MG tablet Take 1 tablet (150 mg) by mouth once a week (Patient not taking: Reported on 4/10/2023) 2 tablet 0     fluconazole (DIFLUCAN) 200 MG tablet Take 1 tablet  (200 mg) by mouth once a week (Patient not taking: Reported on 4/10/2023) 4 tablet 0     hydroxychloroquine (PLAQUENIL) 200 MG tablet Take 2 tablets by mouth daily at 2 pm       ketoconazole (NIZORAL) 2 % external cream Apply topically 2 times daily Apply to affected areas on torso twice daily 120 g 1     loperamide (IMODIUM A-D) 2 MG capsule taking 8 capsules per day       omeprazole (PRILOSEC) 20 MG DR capsule Take 1 capsule by mouth every 24 hours       ONE DAILY MULTIPLE VITAMIN OR        ORENCIA CLICKJECT 125 MG/ML SOAJ auto-injector Inject 125 mg Subcutaneous once a week       predniSONE (DELTASONE) 5 MG tablet take 2-3 tabs PO qday PRN and taper as directed       valACYclovir (VALTREX) 500 MG tablet Take 500 mg by mouth 2 times daily            Allergies   Allergen Reactions     Iodinated Contrast Media Hives     angioneuritic edema of face and throat     Alcohol Hives     Ethanol         No family history on file.       Social History     Tobacco Use     Smoking status: Never     Smokeless tobacco: Never   Substance Use Topics     Alcohol use: Not Currently            Objective:  Physical Exam:  RLE: No pain with axial load or logroll of the hip.  No open wounds, lacerations, or prior surgical incisions about the knee.  No significant edema or ecchymosis.  No erythema or drainage.  No effusion of the knee joint. Mild pain to palpation over the quad tendon patella or patellar tendon.  No pain to palpation over the lateral joint line.   Mild pain to palpation over the medial joint line.  Patellar grind test positive for crepitus and pain.  No pain to palpation over the femoral origin or tibial insertion of the MCL.  No pain to palpation over the femoral origin or fibular insertion of the LCL.  Passive range of motion 0 to 140 degrees of knee flexion.  Active range of motion is equivalent to passive range of motion.  5/5 strength in flexion and extension.  Stable to varus and valgus stress at 0 and 30 degrees  of knee flexion with firm endpoint.  Negative Lachman.  Stable anterior posterior drawer.  Grossly neurovascular intact.    Imaging:  X-ray of the right knee from today was reviewed, including weightbearing films.  This shows severe narrowing with near complete loss of the medial joint space in the right knee with osteophyte formation of the femoral condyle and tibial plateau.  There is slight narrowing of the lateral compartment, but overall well-preserved joint space in the lateral compartment.  Patellofemoral sunrise views show significant narrowing of the medial and lateral patellofemoral joint space.    MRI without contrast of the right knee from March 30, 2024 was reviewed.  This is consistent with tricompartmental osteoarthritis.  There is complete loss of the cartilage from the medial femoral condyle and tibial plateau.  There is also areas of grade III and IV chondromalacia of the lateral tibial plateau.  There are areas of full-thickness cartilage loss of the patella and trochlea.  Moderate joint effusion.    Assessment and Plan: Patient is a 63-year-old female with a past medical history of rheumatoid arthritis who presents here today with right knee tricompartmental osteoarthritis. I reviewed her images with her today.  We reviewed the diagnosis, natural history, and prognosis.  We discussed both operative and nonoperative treatment options.  We discussed nonoperative management in the form of low impact aerobic exercise, weight loss, oral anti-inflammatory pain medication, bracing, physical therapy, and intra-articular corticosteroid and hyaluronic acid injections.  We also discussed operative intervention in the form of total knee arthroplasty.  We reviewed the risks and benefits including but not limited to bleeding, infection requiring revision surgery, loss of range of motion, loss of function, damage surrounding nerves and blood vessels, failure to cure pain, loss of limb and loss of life.  We also  discussed the expected postoperative course after a total knee arthroplasty.  I had the opportunity to answer her questions at this time.     At this time, she feels that she has trialed and exhausted nonoperative management in the form of activity modification, oral anti-inflammatory medication and prednisone, and multiple rounds of corticosteroid injections.  She is interested in moving forward with a right total knee arthroplasty in the near future.  I discussed with her that currently there are several risk factors that place her at a increased risk of joint infection after surgery.  First, she underwent an intra-articular injection on February 27.  Discussed with her that I would not perform a total knee arthroplasty within 3 months of an injection due to the increased risk of infection.  Her aspiration at that time did grow staph epi on culture.  I agree with Dr. Yeo, that this is a false positive contaminant, as her white cell count was 13,000, she received 1 week of complete symptomatic relief finasteride injection, and clinically she has returned to her baseline without any operative intervention or antibiotic treatment.    Additionally, she is taking 10 to 15 mg of p.o. prednisone for her right knee pain.  I would ideally like to see her wean off of her prednisone to surgery to decrease her risk of wound healing complications and infections.  Additionally, she is taking weekly injections of abatacept, a T-cell modulator. Per the 2022 [employer] and AAKS antirheumatologic medications prior to total joint arthroplasty, she will likely have to skip 1 or 2 weeks of her abatacept infusions in order to have her surgery 2 weeks after her last infusion, and ideally taper off of her prednisone to decrease her risk of infection.  I would like her to discuss this further with her rheumatologist.  She did inquire about medications that could help with her right knee pain as she tapers from her  prednisone.  She is currently taking Tylenol.  She cannot take NSAIDs due to her J-pouch.  I think gabapentin could be helpful medication for her.  She has been inquired to GI if this is a safe medication for her.    She is going to discuss optimizing her medication for potential right total knee arthroplasty with her rheumatologist and medical doctor.  I would a follow-up with her in 6 to 8 weeks to further discuss planning of a right total knee arthroplasty this summer or fall.    Keon Thomas MD    [facility]   Department of Orthopedic Surgery      Disclaimer: This note consists of symbols derived from keyboarding, dictation and/or voice recognition software. As a result, there may be errors in the script that have gone undetected. Please consider this when interpreting information found in this chart.         Again, thank you for allowing me to participate in the care of your patient.        Sincerely,        Keon Thomas MD

## 2024-04-05 ENCOUNTER — MYC MEDICAL ADVICE (OUTPATIENT)
Dept: ORTHOPEDICS | Facility: CLINIC | Age: 64
End: 2024-04-05
Payer: MEDICARE

## 2024-04-05 NOTE — TELEPHONE ENCOUNTER
Patient is inquiring about a knee brace and said it was talked about at her visit on 4/4/24. Please advise what type of brace is recommended.     Tamra Silverio MSA, ATC  Certified Athletic Trainer

## 2024-04-25 ENCOUNTER — TELEPHONE (OUTPATIENT)
Dept: ORTHOPEDICS | Facility: CLINIC | Age: 64
End: 2024-04-25
Payer: MEDICARE

## 2024-04-25 DIAGNOSIS — M17.11 LOCALIZED OSTEOARTHRITIS OF RIGHT KNEE: Primary | ICD-10-CM

## 2024-04-25 NOTE — TELEPHONE ENCOUNTER
Other: Pt is wanting a prescription for a walker or a scooter. Please call regarding this     Could we send this information to you in [facility] or would you prefer to receive a phone call?:   Patient would prefer a phone call   Okay to leave a detailed message?: Yes at Cell number on file:    Telephone Information:   Mobile [phone]

## 2024-04-25 NOTE — TELEPHONE ENCOUNTER
Informed patient that provider will place referral to home medical to obtain walker. Order pended for provider.     Tamra Silverio MSA, ATC  Certified Athletic Trainer

## 2024-04-25 NOTE — TELEPHONE ENCOUNTER
Patient last seen 4/4/24 for osteoarthritis of right knee. She was advised to follow up in 6-8 weeks to further discuss planning of a right total knee arthroplasty this summer or fall.     Patient has follow up visit scheduled for 5/16/24.     Please see request for walker or scooter and advise.     Tamra Silverio MSA, ATC  Certified Athletic Trainer

## 2024-05-07 ENCOUNTER — OFFICE VISIT (OUTPATIENT)
Dept: ORTHOPEDICS | Facility: CLINIC | Age: 64
End: 2024-05-07
Payer: MEDICARE

## 2024-05-07 VITALS
HEIGHT: 60 IN | WEIGHT: 194 LBS | BODY MASS INDEX: 38.09 KG/M2 | SYSTOLIC BLOOD PRESSURE: 161 MMHG | DIASTOLIC BLOOD PRESSURE: 95 MMHG

## 2024-05-07 DIAGNOSIS — M05.79 RHEUMATOID ARTHRITIS INVOLVING MULTIPLE SITES WITH POSITIVE RHEUMATOID FACTOR (H): ICD-10-CM

## 2024-05-07 DIAGNOSIS — M17.11 LOCALIZED OSTEOARTHRITIS OF RIGHT KNEE: Primary | ICD-10-CM

## 2024-05-07 PROCEDURE — 20611 DRAIN/INJ JOINT/BURSA W/US: CPT | Mod: RT | Performed by: FAMILY MEDICINE

## 2024-05-07 RX ORDER — LIDOCAINE HYDROCHLORIDE 10 MG/ML
5 INJECTION, SOLUTION INFILTRATION; PERINEURAL
Status: SHIPPED | OUTPATIENT
Start: 2024-05-07

## 2024-05-07 RX ADMIN — LIDOCAINE HYDROCHLORIDE 5 ML: 10 INJECTION, SOLUTION INFILTRATION; PERINEURAL at 16:17

## 2024-05-07 NOTE — PATIENT INSTRUCTIONS
1. Localized osteoarthritis of right knee      -Patient is following up for right knee pain and swelling due to arthritis  -Patient was seen by orthopedic surgery who is planning to perform knee replacement surgery later this year.  -However, the surgeon would like to consult patient's rheumatologist to safely wean her off of her medications for surgery.  Patient also has a follow-up with her rheumatologist for further recommendations  -Patient has been complaining of increased pain and swelling in her knee.  -Patient requested and tolerated aspiration of the right knee joint without complications.  Patient was given postprocedure instructions  -Patient will be referred to pain management to help control her musculoskeletal pain while she is weaning off of her inflammatory medications and up until she has surgery.  -Call direct clinic number [[phone]] at any time with questions or concerns.    Albert Yeo MD [facility] Orthopedics and Sports Medicine  [facility] [place]

## 2024-05-07 NOTE — LETTER
5/7/2024         RE: Maeve Lovell  [address]  [place] [address]        Dear Colleague,    Thank you for referring your patient, Maeve Lovell, to the [facility] [place]. Please see a copy of my visit note below.    ASSESSMENT & PLAN  Patient Instructions     1. Localized osteoarthritis of right knee      -Patient is following up for right knee pain and swelling due to arthritis  -Patient was seen by orthopedic surgery who is planning to perform knee replacement surgery later this year.  -However, the surgeon would like to consult patient's rheumatologist to safely wean her off of her medications for surgery.  Patient also has a follow-up with her rheumatologist for further recommendations  -Patient has been complaining of increased pain and swelling in her knee.  -Patient requested and tolerated aspiration of the right knee joint without complications.  Patient was given postprocedure instructions  -Patient will be referred to pain management to help control her musculoskeletal pain while she is weaning off of her inflammatory medications and up until she has surgery.  -Call direct clinic number [[phone]] at any time with questions or concerns.    Albert Yeo MD [facility] Orthopedics and Sports Medicine  [facility] [place]        -----    SUBJECTIVE:  Maeve Lovell is a 63 year old female who is seen in follow-up for right knee pain.They were last seen 4/2/2024.     Since their last visit reports worsening pain. They indicate that their current pain level is 7/10. They have tried rest/activity avoidance, ice, heat, Tylenol, and corticosteroid injection (most recent date: 02/27/2024) that provided  1 week(s) of relief.      The patient is seen by themselves.    Patient's past medical, surgical, social, and family histories were reviewed today and no changes are noted.    REVIEW OF SYSTEMS:  Constitutional: NEGATIVE for fever, chills,  change in weight  Skin: NEGATIVE for worrisome rashes, moles or lesions  GI/: NEGATIVE for bowel or bladder changes  Neuro: NEGATIVE for weakness, dizziness or paresthesias    OBJECTIVE:  BP (!) 161/95   Ht 1.524 m (5')   Wt 88 kg (194 lb)   BMI 37.89 kg/m     General: healthy, alert and in no distress  HEENT: no scleral icterus or conjunctival erythema  Skin: no suspicious lesions or rash. No jaundice.  CV: regular rhythm by palpation, no pedal edema  Resp: normal respiratory effort without conversational dyspnea   Psych: normal mood and affect  Gait: normal steady gait with appropriate coordination and balance  Neuro: normal light touch sensory exam of the extremities.    MSK:  RIGHT KNEE  Inspection:    normal alignment  Palpation:    Tender about the lateral joint line and medial joint line. Remainder of bony and ligamentous landmarks are nontender.    Mod effusion is present    Patellofemoral crepitus is Absent  Range of Motion:     00 extension to 1000 flexion  Strength:    Quadriceps grossly intact    Extensor mechanism intact  Special Tests:    Positive: none    Negative: MCL/valgus stress (0 & 30 deg), LCL/varus stress (0 & 30 deg), Lachman's, anterior drawer, posterior drawer, Delbert's  Independent visualization of the below image:    Patient's conditions were thoroughly discussed during today's visit with total time spent face-to-face with the patient and documentation being 30 minutes.    Large Joint Injection/Arthocentesis: R knee joint    Date/Time: 5/7/2024 4:17 PM    Performed by: Yeo, Albert, MD  Authorized by: Yeo, Albert, MD    Indications:  Pain and osteoarthritis  Needle Size:  22 G  Guidance: ultrasound    Approach:  Anterolateral  Location:  Knee      Medications:  5 mL lidocaine 1 %  Aspirate amount (mL):  17  Aspirate:  Yellow  Outcome:  Tolerated well, no immediate complications  Procedure discussed: discussed risks, benefits, and alternatives    Consent Given by:  Patient  Timeout:  timeout called immediately prior to procedure    Prep: patient was prepped and draped in usual sterile fashion     Ultrasound was used to ensure safe and accurate needle placement and injection. Ultrasound images of the procedure were permanently stored.          Albert Yeo MD, [facility] [place]      Again, thank you for allowing me to participate in the care of your patient.        Sincerely,        Albert Yeo, MD

## 2024-05-07 NOTE — PROGRESS NOTES
ASSESSMENT & PLAN  Patient Instructions     1. Localized osteoarthritis of right knee      -Patient is following up for right knee pain and swelling due to arthritis  -Patient was seen by orthopedic surgery who is planning to perform knee replacement surgery later this year.  -However, the surgeon would like to consult patient's rheumatologist to safely wean her off of her medications for surgery.  Patient also has a follow-up with her rheumatologist for further recommendations  -Patient has been complaining of increased pain and swelling in her knee.  -Patient requested and tolerated aspiration of the right knee joint without complications.  Patient was given postprocedure instructions  -Patient will be referred to pain management to help control her musculoskeletal pain while she is weaning off of her inflammatory medications and up until she has surgery.  -Call direct clinic number [[phone]] at any time with questions or concerns.    Albert Yeo MD [facility] Orthopedics and Sports Medicine  [facility]        -----    SUBJECTIVE:  Maeve Lovell is a 63 year old female who is seen in follow-up for right knee pain.They were last seen 4/2/2024.     Since their last visit reports worsening pain. They indicate that their current pain level is 7/10. They have tried rest/activity avoidance, ice, heat, Tylenol, and corticosteroid injection (most recent date: 02/27/2024) that provided  1 week(s) of relief.      The patient is seen by themselves.    Patient's past medical, surgical, social, and family histories were reviewed today and no changes are noted.    REVIEW OF SYSTEMS:  Constitutional: NEGATIVE for fever, chills, change in weight  Skin: NEGATIVE for worrisome rashes, moles or lesions  GI/: NEGATIVE for bowel or bladder changes  Neuro: NEGATIVE for weakness, dizziness or paresthesias    OBJECTIVE:  BP (!) 161/95   Ht 1.524 m (5')   Wt 88 kg (194 lb)   BMI 37.89 kg/m     General:  healthy, alert and in no distress  HEENT: no scleral icterus or conjunctival erythema  Skin: no suspicious lesions or rash. No jaundice.  CV: regular rhythm by palpation, no pedal edema  Resp: normal respiratory effort without conversational dyspnea   Psych: normal mood and affect  Gait: normal steady gait with appropriate coordination and balance  Neuro: normal light touch sensory exam of the extremities.    MSK:  RIGHT KNEE  Inspection:    normal alignment  Palpation:    Tender about the lateral joint line and medial joint line. Remainder of bony and ligamentous landmarks are nontender.    Mod effusion is present    Patellofemoral crepitus is Absent  Range of Motion:     00 extension to 1000 flexion  Strength:    Quadriceps grossly intact    Extensor mechanism intact  Special Tests:    Positive: none    Negative: MCL/valgus stress (0 & 30 deg), LCL/varus stress (0 & 30 deg), Lachman's, anterior drawer, posterior drawer, Delbert's  Independent visualization of the below image:    Patient's conditions were thoroughly discussed during today's visit with total time spent face-to-face with the patient and documentation being 30 minutes.    Large Joint Injection/Arthocentesis: R knee joint    Date/Time: 5/7/2024 4:17 PM    Performed by: Yeo, Albert, MD  Authorized by: Yeo, Albert, MD    Indications:  Pain and osteoarthritis  Needle Size:  22 G  Guidance: ultrasound    Approach:  Anterolateral  Location:  Knee      Medications:  5 mL lidocaine 1 %  Aspirate amount (mL):  17  Aspirate:  Yellow  Outcome:  Tolerated well, no immediate complications  Procedure discussed: discussed risks, benefits, and alternatives    Consent Given by:  Patient  Timeout: timeout called immediately prior to procedure    Prep: patient was prepped and draped in usual sterile fashion     Ultrasound was used to ensure safe and accurate needle placement and injection. Ultrasound images of the procedure were permanently stored.          Albert Yeo  MD, CAM  [facility]

## 2024-05-14 ENCOUNTER — MYC MEDICAL ADVICE (OUTPATIENT)
Dept: ORTHOPEDICS | Facility: CLINIC | Age: 64
End: 2024-05-14
Payer: MEDICARE

## 2024-05-14 ENCOUNTER — MYC MEDICAL ADVICE (OUTPATIENT)
Dept: FAMILY MEDICINE | Facility: CLINIC | Age: 64
End: 2024-05-14
Payer: MEDICARE

## 2024-05-14 ASSESSMENT — PATIENT HEALTH QUESTIONNAIRE - PHQ9
SUM OF ALL RESPONSES TO PHQ QUESTIONS 1-9: 1
10. IF YOU CHECKED OFF ANY PROBLEMS, HOW DIFFICULT HAVE THESE PROBLEMS MADE IT FOR YOU TO DO YOUR WORK, TAKE CARE OF THINGS AT HOME, OR GET ALONG WITH OTHER PEOPLE: NOT DIFFICULT AT ALL
SUM OF ALL RESPONSES TO PHQ QUESTIONS 1-9: 1

## 2024-05-14 NOTE — TELEPHONE ENCOUNTER
I called patient.  Patient was referred to the pain management department but since her primary care physician is with [facility] the [facility] they will not see her on a regular basis.  Patient has seen pain management doctor in the [facility] the [facility] system.  Patient may call their office to schedule a pain consult.  Patient may call us if they need a referral order to fax to their office.

## 2024-05-14 NOTE — TELEPHONE ENCOUNTER
Please see patient's mychart message and advise if any other recommendations.    Susanna Khan, ATC

## 2024-05-14 NOTE — TELEPHONE ENCOUNTER
No further action required from triage at this time.  HALIMA Greenfield, BSN, RN (she/her)  [facility] RN

## 2024-05-16 ENCOUNTER — OFFICE VISIT (OUTPATIENT)
Dept: ORTHOPEDICS | Facility: CLINIC | Age: 64
End: 2024-05-16
Payer: MEDICARE

## 2024-05-16 VITALS — BODY MASS INDEX: 38.28 KG/M2 | HEIGHT: 60 IN | WEIGHT: 195 LBS

## 2024-05-16 DIAGNOSIS — M17.11 PRIMARY OSTEOARTHRITIS OF RIGHT KNEE: Primary | ICD-10-CM

## 2024-05-16 PROCEDURE — 99214 OFFICE O/P EST MOD 30 MIN: CPT | Performed by: STUDENT IN AN ORGANIZED HEALTH CARE EDUCATION/TRAINING PROGRAM

## 2024-05-16 NOTE — LETTER
5/16/2024        RE: Maeve M Nas  [address]  [place] [address]        CC: Right knee arthritis    HPI: Patient is a 63-year-old female known to my practice with right knee osteoarthritis.  For full history and physical please see my note from April 4, 2024 she also has a medical history of rheumatoid arthritis and Crohn's disease.  She is currently taking abatacept weekly infusions for her rheumatoid arthritis.  She feels this is helping her overall rheumatoid control.  She also takes 10 to 15 mg of oral prednisone daily for her rheumatoid arthritis.  She did see her rheumatologist yesterday to discuss DMARD dosing around total joint arthroplasty.  Per patient report, it was recommended to skip 1 week of treatment prior to the surgery and 3 to 4 weeks after until incision healed.  This matches what I have found from the, 2022 [employer] and [facility] antirheumatologic medications guidelines.  She did recently have an aspiration of her right knee on May 7, 2024.  I discussed with her that I would recommend waiting 3 months for a total knee arthroplasty after any injection or aspiration of her knee due to the bacterial load from the needle.  She understands this.  She continues to have predominantly medial sided knee pain.  She is ambulating with the use of a cane she is interested in setting up a total knee arthroplasty in September 2024.    Objective:   PE:  RLE: No open wounds or lacerations.  Pain to palpation over the medial joint line.  0 to 130 degrees knee flexion.  5/5 knee flexion extension.  5/5 ankle plantarflexion/dorsiflexion      A/P:  Patient is a 63-year-old female known to my practice with right knee tricompartmental osteoarthritis most focal in the medial compartment.  She is interested in scheduling a right total knee arthroplasty in September 2024.  I think this is very reasonable given her long trial of nonoperative management.  We again discussed the risks and  benefits of operative intervention as well as nonoperative alternatives.  We discussed the surgical technique of a total knee arthroplasty.  We discussed the expected postoperative protocol.  Discussed risk and benefits including but not limited to bleeding, infection, failure to cure pain, damage surrounding nerves and blood vessels, stiffness, postoperative DVT/PE, loss of function, loss of limb and loss of life.  I the opportunity answer questions.  She wished to move forward in scheduling a total knee arthroplasty    Patient has a nickel allergy.  I will request nickel free implants from [employer].  I will plan to use a CR femur and MC poly.  In regards to her immunomodulating medication.  I agree with her rheumatologist plan of holding her abatacept 1 to 2 weeks before surgery and 3-4 weeks after surgery until the incision has healed.  I would like to see her taper her prednisone usage down if possible prior to the surgery.  I would not require her to be off of the prednisone to undergo surgery.  I appreciate the recommendations from her rheumatology team.  I discussed with her postoperative DVT prophylaxis.  Typically in a low risk patient I would use 1 or 62 mg aspirin daily for 6 weeks.  There is a concern that this may cause GI upset due to her history of Crohn's.  She is going to discuss further with her medical doctor.  Alternatively we could use Eliquis or Lovenox injections if necessary.  I also discussed postoperative pain management with her.  I discussed with her that I typically prescribe oxycodone, Tylenol, and gabapentin postoperatively in combination of an NSAID of her choice and cryotherapy.  Certainly NSAIDs may not be an option for her given her Crohn's disease.  She is in agreement with the above discussion and plan moving forward.  I will schedule her for a total knee arthroplasty in September 2024.  She will follow-up with me in clinic in August 2024 for preoperative exam.    Keon FERNANDEZ  MD Martha    [facility]   Department of Orthopedic Surgery      Disclaimer: This note consists of symbols derived from keyboarding, dictation and/or voice recognition software. As a result, there may be errors in the script that have gone undetected. Please consider this when interpreting information found in this chart.         Sincerely,        Keon Thomas MD

## 2024-05-16 NOTE — PROGRESS NOTES
CC: Right knee arthritis    HPI: Patient is a 63-year-old female known to my practice with right knee osteoarthritis.  For full history and physical please see my note from April 4, 2024 she also has a medical history of rheumatoid arthritis and Crohn's disease.  She is currently taking abatacept weekly infusions for her rheumatoid arthritis.  She feels this is helping her overall rheumatoid control.  She also takes 10 to 15 mg of oral prednisone daily for her rheumatoid arthritis.  She did see her rheumatologist yesterday to discuss DMARD dosing around total joint arthroplasty.  Per patient report, it was recommended to skip 1 week of treatment prior to the surgery and 3 to 4 weeks after until incision healed.  This matches what I have found from the, 2022 American College of Rheumatology and [facility] antirheumatologic medications guidelines.  She did recently have an aspiration of her right knee on May 7, 2024.  I discussed with her that I would recommend waiting 3 months for a total knee arthroplasty after any injection or aspiration of her knee due to the bacterial load from the needle.  She understands this.  She continues to have predominantly medial sided knee pain.  She is ambulating with the use of a cane she is interested in setting up a total knee arthroplasty in September 2024.    Objective:   PE:  RLE: No open wounds or lacerations.  Pain to palpation over the medial joint line.  0 to 130 degrees knee flexion.  5/5 knee flexion extension.  5/5 ankle plantarflexion/dorsiflexion      A/P:  Patient is a 63-year-old female known to my practice with right knee tricompartmental osteoarthritis most focal in the medial compartment.  She is interested in scheduling a right total knee arthroplasty in September 2024.  I think this is very reasonable given her long trial of nonoperative management.  We again discussed the risks and benefits of operative intervention as well as nonoperative alternatives.  We discussed  the surgical technique of a total knee arthroplasty.  We discussed the expected postoperative protocol.  Discussed risk and benefits including but not limited to bleeding, infection, failure to cure pain, damage surrounding nerves and blood vessels, stiffness, postoperative DVT/PE, loss of function, loss of limb and loss of life.  I the opportunity answer questions.  She wished to move forward in scheduling a total knee arthroplasty    Patient has a nickel allergy.  I will request nickel free implants from [employer].  I will plan to use a CR femur and MC poly.  In regards to her immunomodulating medication.  I agree with her rheumatologist plan of holding her abatacept 1 to 2 weeks before surgery and 3-4 weeks after surgery until the incision has healed.  I would like to see her taper her prednisone usage down if possible prior to the surgery.  I would not require her to be off of the prednisone to undergo surgery.  I appreciate the recommendations from her rheumatology team.  I discussed with her postoperative DVT prophylaxis.  Typically in a low risk patient I would use 1 or 62 mg aspirin daily for 6 weeks.  There is a concern that this may cause GI upset due to her history of Crohn's.  She is going to discuss further with her medical doctor.  Alternatively we could use Eliquis or Lovenox injections if necessary.  I also discussed postoperative pain management with her.  I discussed with her that I typically prescribe oxycodone, Tylenol, and gabapentin postoperatively in combination of an NSAID of her choice and cryotherapy.  Certainly NSAIDs may not be an option for her given her Crohn's disease.  She is in agreement with the above discussion and plan moving forward.  I will schedule her for a total knee arthroplasty in September 2024.  She will follow-up with me in clinic in August 2024 for preoperative exam.    Keon Thomas MD    [facility]   Department of Orthopedic  Surgery      Disclaimer: This note consists of symbols derived from keyboarding, dictation and/or voice recognition software. As a result, there may be errors in the script that have gone undetected. Please consider this when interpreting information found in this chart.

## 2024-05-16 NOTE — LETTER
5/16/2024         RE: Maeve Lovell  [address]  [place] [address]        Dear Colleague,    Thank you for referring your patient, Maeve Lovell, to the [facility]. Please see a copy of my visit note below.    CC: Right knee arthritis    HPI: Patient is a 63-year-old female known to my practice with right knee osteoarthritis.  For full history and physical please see my note from April 4, 2024 she also has a medical history of rheumatoid arthritis and Crohn's disease.  She is currently taking abatacept weekly infusions for her rheumatoid arthritis.  She feels this is helping her overall rheumatoid control.  She also takes 10 to 15 mg of oral prednisone daily for her rheumatoid arthritis.  She did see her rheumatologist yesterday to discuss DMARD dosing around total joint arthroplasty.  Per patient report, it was recommended to skip 1 week of treatment prior to the surgery and 3 to 4 weeks after until incision healed.  This matches what I have found from the, 2022 American College of Rheumatology and [facility] antirheumatologic medications guidelines.  She did recently have an aspiration of her right knee on May 7, 2024.  I discussed with her that I would recommend waiting 3 months for a total knee arthroplasty after any injection or aspiration of her knee due to the bacterial load from the needle.  She understands this.  She continues to have predominantly medial sided knee pain.  She is ambulating with the use of a cane she is interested in setting up a total knee arthroplasty in September 2024.    Objective:   PE:  RLE: No open wounds or lacerations.  Pain to palpation over the medial joint line.  0 to 130 degrees knee flexion.  5/5 knee flexion extension.  5/5 ankle plantarflexion/dorsiflexion      A/P:  Patient is a 63-year-old female known to my practice with right knee tricompartmental osteoarthritis most focal in the medial compartment.  She is interested in  scheduling a right total knee arthroplasty in September 2024.  I think this is very reasonable given her long trial of nonoperative management.  We again discussed the risks and benefits of operative intervention as well as nonoperative alternatives.  We discussed the surgical technique of a total knee arthroplasty.  We discussed the expected postoperative protocol.  Discussed risk and benefits including but not limited to bleeding, infection, failure to cure pain, damage surrounding nerves and blood vessels, stiffness, postoperative DVT/PE, loss of function, loss of limb and loss of life.  I the opportunity answer questions.  She wished to move forward in scheduling a total knee arthroplasty    Patient has a nickel allergy.  I will request nickel free implants from [employer].  I will plan to use a CR femur and MC poly.  In regards to her immunomodulating medication.  I agree with her rheumatologist plan of holding her abatacept 1 to 2 weeks before surgery and 3-4 weeks after surgery until the incision has healed.  I would like to see her taper her prednisone usage down if possible prior to the surgery.  I would not require her to be off of the prednisone to undergo surgery.  I appreciate the recommendations from her rheumatology team.  I discussed with her postoperative DVT prophylaxis.  Typically in a low risk patient I would use 1 or 62 mg aspirin daily for 6 weeks.  There is a concern that this may cause GI upset due to her history of Crohn's.  She is going to discuss further with her medical doctor.  Alternatively we could use Eliquis or Lovenox injections if necessary.  I also discussed postoperative pain management with her.  I discussed with her that I typically prescribe oxycodone, Tylenol, and gabapentin postoperatively in combination of an NSAID of her choice and cryotherapy.  Certainly NSAIDs may not be an option for her given her Crohn's disease.  She is in agreement with the above discussion and  plan moving forward.  I will schedule her for a total knee arthroplasty in September 2024.  She will follow-up with me in clinic in August 2024 for preoperative exam.    Keon Thomas MD    [facility]   Department of Orthopedic Surgery      Disclaimer: This note consists of symbols derived from keyboarding, dictation and/or voice recognition software. As a result, there may be errors in the script that have gone undetected. Please consider this when interpreting information found in this chart.         Again, thank you for allowing me to participate in the care of your patient.        Sincerely,        Keon Thomas MD

## 2024-05-17 ENCOUNTER — TELEPHONE (OUTPATIENT)
Dept: ORTHOPEDICS | Facility: CLINIC | Age: 64
End: 2024-05-17

## 2024-05-17 NOTE — TELEPHONE ENCOUNTER
Patient has been scheduled for surgery. Details are below.    Date of Surgery: 08/21/24    Approximate Arrival Time: SURGERY CENTER WILL CALL 3/4 DAYS PRIOR TO CONFIRM A TIME   Surgeon:  DR. YEVTTE BOYLE     Procedure: ARTHROPLASTY KNEE TOTAL, RIGHT  Location: [facility],  [address] [place] [address]  Surgery Consult: 07/17/214  PreOp Physical: 07/23/24  PostOp: 09/05 & 10/03/24  Packet Mailed/MyChart Sent: YES  Added to [place]: YES    Spoke to: BARB

## 2024-05-18 ENCOUNTER — MYC MEDICAL ADVICE (OUTPATIENT)
Dept: ORTHOPEDICS | Facility: CLINIC | Age: 64
End: 2024-05-18
Payer: MEDICARE

## 2024-05-20 NOTE — TELEPHONE ENCOUNTER
Please see patient's message about cobalt and advise on response.     Tamra Silverio MSA, ATC  Certified Athletic Trainer

## 2024-05-20 NOTE — TELEPHONE ENCOUNTER
Please see patient MyChart and advise on response regarding potential allergy, and scheduling a total knee arthroplasty.     Tamra Silverio MSA, ATC  Certified Athletic Trainer

## 2024-06-26 ENCOUNTER — TELEPHONE (OUTPATIENT)
Dept: ORTHOPEDICS | Facility: CLINIC | Age: 64
End: 2024-06-26
Payer: MEDICARE

## 2024-06-26 DIAGNOSIS — M17.11 PRIMARY OSTEOARTHRITIS OF RIGHT KNEE: Primary | ICD-10-CM

## 2024-06-26 NOTE — TELEPHONE ENCOUNTER
Other: Pt called requesting for prescription for scooter for a month? Can care team reach out? Pt has a walker and sometimes cannot use the walker. Pt surgery is on 08/21. If provider can prescribe the scooter, pt would like it to be sent to [facility] on [address] or to [facility] on [address]. Please reach out to discuss     Could we send this information to you in [facility] or would you prefer to receive a phone call?:   Patient would prefer a phone call   Okay to leave a detailed message?: Yes at Cell number on file:    Telephone Information:   Mobile [phone]

## 2024-06-27 NOTE — TELEPHONE ENCOUNTER
Patient is scheduled for right TKA on 8/21/24. Please see below message and advise if agreeable for scooter request, or if patient should just continue with use of walker.    Tamra Silverio, ATC

## 2024-06-28 NOTE — TELEPHONE ENCOUNTER
DME order pending signature. (Since surgery is not until 8/21/24, ordered for 2 months.)    KELLY Monroy, RN

## 2024-06-28 NOTE — TELEPHONE ENCOUNTER
Phone call to patient. She states she did not get our previous message.   She received a call from [facility] stating Medicare does not cover the scooter. She is going to pay for it out of pocket. Writer also suggested she could purchase a scooter online from [employer]. She verbalized understanding and was appreciative of the call.     KELLY Monroy RN

## 2024-06-28 NOTE — TELEPHONE ENCOUNTER
Phone call to patient. Call was answered but unable to tell if it was an answering machine or not. Left voicemail asking for a return call to let us know what location they want the knee scooter order sent to.     After call, notices patient requested [facility]. Orders faxed and confirmed they went through via [employer].  [employer] message sent to patient.     KELLY Monroy RN

## 2024-07-17 ENCOUNTER — VIRTUAL VISIT (OUTPATIENT)
Dept: ORTHOPEDICS | Facility: CLINIC | Age: 64
End: 2024-07-17
Payer: MEDICARE

## 2024-07-17 DIAGNOSIS — M17.11 PRIMARY OSTEOARTHRITIS OF RIGHT KNEE: Primary | ICD-10-CM

## 2024-07-17 PROCEDURE — 99207 PR NO CHARGE NURSE ONLY: CPT | Mod: 93

## 2024-07-17 NOTE — PROGRESS NOTES
This is a non-billable nursing telephone visit.    Teaching Flowsheet   Relevant Diagnosis: primary osteoarthritis of right knee  Teaching Topic: right total knee arthroplasty    Patient lives alone but has a friend, Carrie, who will drive her to and from the hospital.  She will also help her post operatively around the home making sure she can get in and out of bed/shower/etc.  She lives in a one story home, basement contains laundry machines and she reports friend can help with laundry until she feels safe and confident in her ability to do stairs and practiced with PT post op. Red flag Rheumatology medications discussed with patient includes Orencia, Plaquenil, and Prednisone.  Patient will meet with Rheumatologist 2 weeks prior to surgery to discuss medication hold times for these medications.  Reviewed Dr. Thomas's instructions for Prednisone include recommending tapering (if possible), but she does not need to be completely off medication by time of surgery.  Other medications she takes will be discussed with pre-op H&P provider.  Good understanding was expressed. Discussed total joint online class.  Patient will call if they require help for signing up for the total joint zoom class. Patient understands they will need a preop exam within 30 days of date of surgery. Patient understands the expected length of stay and the expectation of outpatient physical therapy. Patient understands the need for an at home  after surgery and need for transportation home.  Discussed dental/non-sterile procedure prophylaxis. Discussed the [facility] Care Companion service. Discussed stoplight tool and how to use it with patient.       Person(s) involved in teaching:   Patient     Motivation Level:  Asks Questions: Yes  Eager to Learn: Yes  Cooperative: Yes  Receptive (willing/able to accept information): Yes  Any cultural factors/Jain beliefs that may influence understanding or compliance? No  Comments: none     Patient  demonstrates understanding of the following:  Reason for the appointment, diagnosis and treatment plan: Yes  Knowledge of proper use of medications and conditions for which they are ordered (with special attention to potential side effects or drug interactions): Yes  Which situations necessitate calling provider and whom to contact: Yes       Teaching Concerns Addressed:   Comments: none     Proper use and care of (medical equip, care aids, etc.): Yes  Nutritional needs and diet plan: Yes  Pain management techniques: Yes  Wound Care: Yes  How and/when to access community resources: Yes     Instructional Materials Used/Given: Preoperative teaching packet, surgical soap x2, dental card, total joint booklet, welcome letter, stoplight tool.       Time spent with patient: 30 minutes.

## 2024-07-18 ENCOUNTER — MYC MEDICAL ADVICE (OUTPATIENT)
Dept: PALLIATIVE MEDICINE | Facility: CLINIC | Age: 64
End: 2024-07-18
Payer: MEDICARE

## 2024-07-19 ENCOUNTER — TELEPHONE (OUTPATIENT)
Dept: ORTHOPEDICS | Facility: CLINIC | Age: 64
End: 2024-07-19
Payer: MEDICARE

## 2024-07-19 NOTE — TELEPHONE ENCOUNTER
"Phoned patient back regarding total joint component question.    Writer consulted with Dr. Thomas on ingrediants and metal allergy testing.  Per Dr. Thomas-     \"Aluminum (6%), Vanadium (4), Carbon (0.1%), Nitrogen (0.05%), Oxygen (0.2%), Hydrogen (0.015%), Iron (0.3%), and the rest titanium.  No Nickel or Cobalt. I personally would not require her to get metal allergy testing before her surgery.  However, if she would like to pursue metal testing for peace of mind, that is her decision.\"    Message relayed over the phone, she requests the ingredients be sent via [email or web address] as well.  She is thankful for callback and has no further questions.    Nancy Farfan RN on 7/19/2024 at 11:20 AM    "

## 2024-07-19 NOTE — TELEPHONE ENCOUNTER
Other: Requesting c/b- had a nurse appt on 07/17- that nurse is supposed to let her know the ingredients in the compound because she has  some allergies      Could we send this information to you in [employer] or would you prefer to receive a phone call?:   Patient would prefer a phone call   Okay to leave a detailed message?: No at Cell number on file:    Telephone Information:   Mobile [phone]

## 2024-07-24 ENCOUNTER — TELEPHONE (OUTPATIENT)
Dept: ORTHOPEDICS | Facility: CLINIC | Age: 64
End: 2024-07-24
Payer: MEDICARE

## 2024-07-24 NOTE — TELEPHONE ENCOUNTER
Other: Patient returning call for Nancy      Could we send this information to you in [employer] or would you prefer to receive a phone call?:   Patient would prefer a phone call   Okay to leave a detailed message?: Yes at Home number on file [phone] (home)

## 2024-07-24 NOTE — TELEPHONE ENCOUNTER
Phoned patient to discuss post operative PT options after right total knee arthroplasty surgery on 8/21 with Dr. Thomas.    Patient inquired if she could complete outpatient physical therapy virtually after surgery.  We advise in-person PT visits after surgery.  Patient inquired about home care physical therapy. Writer explained that home care physical therapy services require a homebound status in order to qualify for insurance coverage.  Explained out of pocket costs to home care therapy visits.  Also explained capacity of home care is limited, it is possible they could decline the referral due to staffing.  Writer reviewed options for getting to and from outpatient PT appts ([employer], taxi, Uber/Lyft, friend/family member).  Patient agreeable to outpatient PT and thankful for callback/explanation.    Nancy Farfan RN on 7/24/2024 at 10:38 AM

## 2024-07-24 NOTE — TELEPHONE ENCOUNTER
Received message from physical therapy department.  Patient is inquiring about physical therapy options for after surgery.  No answer during call, VM left with callback instructions.    Nancy Farfan RN on 7/24/2024 at 9:09 AM

## 2024-07-29 ENCOUNTER — OFFICE VISIT (OUTPATIENT)
Dept: FAMILY MEDICINE | Facility: CLINIC | Age: 64
End: 2024-07-29
Payer: MEDICARE

## 2024-07-29 VITALS
WEIGHT: 189.1 LBS | TEMPERATURE: 98 F | OXYGEN SATURATION: 97 % | BODY MASS INDEX: 37.12 KG/M2 | HEART RATE: 84 BPM | HEIGHT: 60 IN | SYSTOLIC BLOOD PRESSURE: 132 MMHG | DIASTOLIC BLOOD PRESSURE: 80 MMHG | RESPIRATION RATE: 21 BRPM

## 2024-07-29 DIAGNOSIS — D84.821 IMMUNODEFICIENCY DUE TO DRUGS (CODE) (H): ICD-10-CM

## 2024-07-29 DIAGNOSIS — R19.8 HIGH OUTPUT ILEOSTOMY (H): ICD-10-CM

## 2024-07-29 DIAGNOSIS — I44.7 LBBB (LEFT BUNDLE BRANCH BLOCK): ICD-10-CM

## 2024-07-29 DIAGNOSIS — Z01.818 PREOP GENERAL PHYSICAL EXAM: Primary | ICD-10-CM

## 2024-07-29 DIAGNOSIS — M17.11 PRIMARY OSTEOARTHRITIS OF RIGHT KNEE: ICD-10-CM

## 2024-07-29 DIAGNOSIS — E66.01 CLASS 2 SEVERE OBESITY DUE TO EXCESS CALORIES WITH SERIOUS COMORBIDITY AND BODY MASS INDEX (BMI) OF 36.0 TO 36.9 IN ADULT (H): ICD-10-CM

## 2024-07-29 DIAGNOSIS — E66.812 CLASS 2 SEVERE OBESITY DUE TO EXCESS CALORIES WITH SERIOUS COMORBIDITY AND BODY MASS INDEX (BMI) OF 36.0 TO 36.9 IN ADULT (H): ICD-10-CM

## 2024-07-29 DIAGNOSIS — Z93.2 HIGH OUTPUT ILEOSTOMY (H): ICD-10-CM

## 2024-07-29 DIAGNOSIS — M05.79 RHEUMATOID ARTHRITIS INVOLVING MULTIPLE SITES WITH POSITIVE RHEUMATOID FACTOR (H): ICD-10-CM

## 2024-07-29 LAB
ERYTHROCYTE [DISTWIDTH] IN BLOOD BY AUTOMATED COUNT: 15.9 % (ref 10–15)
HCT VFR BLD AUTO: 40.3 % (ref 35–47)
HGB BLD-MCNC: 12.1 G/DL (ref 11.7–15.7)
MCH RBC QN AUTO: 26 PG (ref 26.5–33)
MCHC RBC AUTO-ENTMCNC: 30 G/DL (ref 31.5–36.5)
MCV RBC AUTO: 87 FL (ref 78–100)
PLATELET # BLD AUTO: 328 10E3/UL (ref 150–450)
RBC # BLD AUTO: 4.66 10E6/UL (ref 3.8–5.2)
WBC # BLD AUTO: 9 10E3/UL (ref 4–11)

## 2024-07-29 PROCEDURE — 99215 OFFICE O/P EST HI 40 MIN: CPT | Performed by: NURSE PRACTITIONER

## 2024-07-29 PROCEDURE — 36415 COLL VENOUS BLD VENIPUNCTURE: CPT | Performed by: NURSE PRACTITIONER

## 2024-07-29 PROCEDURE — 85027 COMPLETE CBC AUTOMATED: CPT | Performed by: NURSE PRACTITIONER

## 2024-07-29 ASSESSMENT — PAIN SCALES - GENERAL: PAINLEVEL: SEVERE PAIN (7)

## 2024-07-29 NOTE — PROGRESS NOTES
Preoperative Evaluation  [facility]  [address]  [address]  [place] [address]  Phone: [phone]  Fax: [phone]  Primary Provider: Radha Saenz  Pre-op Performing Provider: Kimberly Childress NP  Jul 29, 2024 7/29/2024   Surgical Information   What procedure is being done? full knee replacement   Facility or Hospital where procedure/surgery will be performed: [facility]   Who is doing the procedure / surgery? dr lara   Date of surgery / procedure: august 21   Time of surgery / procedure: i wouldnt know   Where do you plan to recover after surgery? at home alone        Fax number for surgical facility: Note does not need to be faxed, will be available electronically in Epic.    Assessment & Plan     The proposed surgical procedure is considered INTERMEDIATE risk.    (Z01.818) Preop general physical exam  (primary encounter diagnosis)  Comment:   Plan: CBC with platelets            (M17.11) Primary osteoarthritis of right knee  Comment:   Plan:     (I44.7) LBBB (left bundle branch block)  Comment:   Plan: She had a NM stress test 2/24 and a cardiology consult 5/24.    (M05.79) Rheumatoid arthritis involving multiple sites with positive rheumatoid factor (H)  Comment: stable  Plan: She has an appointment this week with her rheumatologist to discuss when to stop her Orencia.     (E66.01,  Z68.36) Class 2 severe obesity due to excess calories with serious comorbidity and body mass index (BMI) of 36.0 to 36.9 in adult (H)  Comment:   Plan: She will be able to work on exercise for weight loss more after she has recovered from surgery.     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   Time spent by me doing chart review, history and exam, documentation and further activities per the note         - No identified additional risk factors other than previously addressed    Preoperative Medication Instructions  Antiplatelet or Anticoagulation Medication Instructions   - Patient is on no antiplatelet or anticoagulation medications.    Additional Medication Instructions  Consult rheumatology for when to stop RA medications    Recommendation  Approval given to proceed with proposed procedure, without further diagnostic evaluation.    Neli Mcfarlane is a 63 year old, presenting for the following:  Pre-Op Exam (DOS: 08/21/2024/ARTHROPLASTY, KNEE, TOTAL)        HPI related to upcoming procedure: Chronic right knee pain secondary to OA, failed conservative treatment.         7/29/2024   Pre-Op Questionnaire   Have you ever had a heart attack or stroke? No   Have you ever had surgery on your heart or blood vessels, such as a stent placement, a coronary artery bypass, or surgery on an artery in your head, neck, heart, or legs? No   Do you have chest pain with activity? No   Do you have a history of heart failure? No   Do you currently have a cold, bronchitis or symptoms of other infection? No   Do you have a cough, shortness of breath, or wheezing? No   Do you or anyone in your family have previous history of blood clots? Not aware   Do you or does anyone in your family have a serious bleeding problem such as prolonged bleeding following surgeries or cuts? No   Have you ever had problems with anemia or been told to take iron pills? (!) YES currently takes iron a few days a week   Have you had any abnormal blood loss such as black, tarry or bloody stools, or abnormal vaginal bleeding? No   Have you ever had a blood transfusion? No   Are you willing to have a blood transfusion if it is medically needed before, during, or after your surgery? Yes   Have you or any of your relatives ever had problems with anesthesia? (!) YES woke up during surgery in the past   Do you have sleep apnea, excessive snoring or daytime  drowsiness? No   Do you have any artifical heart valves or other implanted medical devices like a pacemaker, defibrillator, or continuous glucose monitor? No   Do you have artificial joints? No   Are you allergic to latex? No        Health Care Directive  Patient does not have a Health Care Directive or Living Will: Discussed advance care planning with patient; however, patient declined at this time.    Preoperative Review of    reviewed - no record of controlled substances prescribed.          Patient Active Problem List    Diagnosis Date Noted    Primary osteoarthritis of right knee 06/07/2023     Priority: Medium    Morbid obesity (H) 04/10/2023     Priority: Medium    Rheumatoid arthritis involving multiple sites with positive rheumatoid factor (H) 08/03/2022     Priority: Medium     Dr. Hayes at [facility]      Ulcerative pancolitis without complication (H) 08/03/2022     Priority: Medium     Sees [facility]      Osteoporosis, unspecified osteoporosis type, unspecified pathological fracture presence 08/03/2022     Priority: Medium    Lichen sclerosus 08/03/2022     Priority: Medium    Foot drop, left 05/01/2022     Priority: Medium    Benign neoplasm of cecum 08/23/2021     Priority: Medium    Colon polyp 08/19/2021     Priority: Medium    Diverticulosis of large intestine 05/20/2019     Priority: Medium     Formatting of this note might be different from the original.  Multiple small and large-mouthed diverticula were found in the sigmoid colon      Hemorrhoids 04/10/2019     Priority: Medium     Formatting of this note might be different from the original.  Added automatically from request for surgery [id number]      Chronic rhinitis 04/07/2012     Priority: Medium     Formatting of this note might be different from the original.  Related to allergies      Cholelithiasis 12/20/2010     Priority: Medium     Formatting of this note might be different from the original.  Asymptomatic,  incidental finding on ultrasound in 2009/2010      Hematochezia 11/28/2007     Priority: Medium     Formatting of this note might be different from the original.  Rarely due to hemorrhoids.      Hemorrhoids, external 11/28/2007     Priority: Medium    Hallux valgus, acquired 10/17/2006     Priority: Medium      Past Medical History:   Diagnosis Date    Lichen sclerosus of female genitalia     RA (rheumatoid arthritis) (H)     Ulcerative colitis (H)      No past surgical history on file.  Current Outpatient Medications   Medication Sig Dispense Refill    clobetasol (TEMOVATE) 0.05 % external ointment Use a pea sized amount one to two times weekly as needed  Indications: Inflammation, lichen sclerosus      hydroxychloroquine (PLAQUENIL) 200 MG tablet Take 2 tablets by mouth daily at 2 pm      loperamide (IMODIUM A-D) 2 MG capsule taking 8 capsules per day      ONE DAILY MULTIPLE VITAMIN OR       ORENCIA CLICKJECT 125 MG/ML SOAJ auto-injector Inject 125 mg Subcutaneous once a week      predniSONE (DELTASONE) 5 MG tablet take 2-3 tabs PO qday PRN and taper as directed      valACYclovir (VALTREX) 500 MG tablet Take 500 mg by mouth 2 times daily      diphenoxylate-atropine (LOMOTIL) 2.5-0.025 MG tablet TAKE 2 TABLETS BY MOUTH FOUR TIMES DAILY      etanercept (ENBREL SURECLICK) 50 MG/ML autoinjector Inject 50 mg Subcutaneous every 7 days      ketoconazole (NIZORAL) 2 % external cream Apply topically 2 times daily Apply to affected areas on torso twice daily 120 g 1    omeprazole (PRILOSEC) 20 MG DR capsule Take 1 capsule by mouth every 24 hours         Allergies   Allergen Reactions    Heparin Hives    Iodinated Contrast Media Hives     angioneuritic edema of face and throat    Methylprednisolone Hives    Alcohol Hives    Egg White (Egg Protein) Nausea    Wheat Nausea        Social History     Tobacco Use    Smoking status: Never    Smokeless tobacco: Never   Substance Use Topics    Alcohol use: Not Currently       History    Drug Use Unknown             Review of Systems  CONSTITUTIONAL: NEGATIVE for fever, chills, change in weight  INTEGUMENTARY/SKIN: NEGATIVE for worrisome rashes, moles or lesions  EYES: NEGATIVE for vision changes or irritation  ENT/MOUTH: NEGATIVE for ear, mouth and throat problems  RESP: NEGATIVE for significant cough or SOB  CV: NEGATIVE for chest pain, palpitations or peripheral edema  GI: NEGATIVE for nausea, abdominal pain, heartburn, or change in bowel habits  : NEGATIVE for frequency, dysuria, or hematuria  MUSCULOSKELETAL:see HPI  NEURO: NEGATIVE for weakness, dizziness or paresthesias  ENDOCRINE: NEGATIVE for temperature intolerance, skin/hair changes  HEME: NEGATIVE for bleeding problems  PSYCHIATRIC: NEGATIVE for changes in mood or affect    Objective    There were no vitals taken for this visit.   Estimated body mass index is 38.08 kg/m  as calculated from the following:    Height as of 5/16/24: 1.524 m (5').    Weight as of 5/16/24: 88.5 kg (195 lb).  Physical Exam  GENERAL: alert and no distress  EYES: Eyes grossly normal to inspection, PERRL and conjunctivae and sclerae normal  HENT: ear canals and TM's normal, nose and mouth without ulcers or lesions  NECK: no adenopathy, no asymmetry, masses, or scars  RESP: lungs clear to auscultation - no rales, rhonchi or wheezes  CV: regular rates and rhythm, normal S1 S2, no S3 or S4, grade 2/6 systolic murmur, peripheral pulses strong, and no peripheral edema  ABDOMEN: soft, nontender, no hepatosplenomegaly, no masses and bowel sounds normal  MS: no gross musculoskeletal defects noted, no edema  SKIN: no suspicious lesions or rashes  NEURO: Normal strength and tone, mentation intact and speech normal  PSYCH: mentation appears normal, affect normal/bright    Recent Labs   Lab Test 03/05/24  1145   HGB 10.9*           Diagnostics  Recent Results (from the past 24 hour(s))   CBC with platelets    Collection Time: 07/29/24  9:47 AM   Result Value Ref  Range    WBC Count 9.0 4.0 - 11.0 10e3/uL    RBC Count 4.66 3.80 - 5.20 10e6/uL    Hemoglobin 12.1 11.7 - 15.7 g/dL    Hematocrit 40.3 35.0 - 47.0 %    MCV 87 78 - 100 fL    MCH 26.0 (L) 26.5 - 33.0 pg    MCHC 30.0 (L) 31.5 - 36.5 g/dL    RDW 15.9 (H) 10.0 - 15.0 %    Platelet Count 328 150 - 450 10e3/uL      No EKG required, no history of coronary heart disease, significant arrhythmia, peripheral arterial disease or other structural heart disease.    Revised Cardiac Risk Index (RCRI)  The patient has the following serious cardiovascular risks for perioperative complications:   - No serious cardiac risks = 0 points     RCRI Interpretation: 0 points: Class I (very low risk - 0.4% complication rate)         Signed Electronically by: Kimberly Childress NP  A copy of this evaluation report is provided to the requesting physician.

## 2024-07-29 NOTE — PATIENT INSTRUCTIONS

## 2024-07-30 NOTE — PROVIDER NOTIFICATION
07/30/24 1537   Discharge Planning   Patient/Family Anticipates Transition to home   Concerns to be Addressed all concerns addressed in this encounter   Living Arrangements   People in Home alone   Type of Residence Private Residence   Is your private residence a single family home or apartment? Single family home   Number of Stairs, Within Home, Primary two   Stair Railings, Within Home, Primary railings safe and in good condition   Once home, are you able to live on one level? Yes   Which level? Main Level   Equipment Currently Used at Home grab bar, tub/shower;grab bar, toilet;shower chair;cane, quad;colostomy/ostomy supplies;walker, rolling;other (see comments)  (Scooter)   Support System   Support Systems Friends/Neighbors   Do you have someone available to stay with you one or two nights once you are home? No  (Friend)   Medical Clearance   Date of Physical 07/29/24   Clinic Name [facility]    It is recommended that you call and check with any specialty providers before surgery to see if you need surgical clearance.  Do you see any specialty providers outside of your primary care provider? Yes  (Will check with her Rhematologist re RA meds)   Blood   Known Bleeding Disorder or Coagulopathy No   Does the patient have any Quaker/cultural preferences related to blood products? No   Education   Has the patient scheduled or completed pre-op total joint education, either in class or online, in the last 12 months? Yes   Patient attended total joint pre-op class/received pre-op teaching  online

## 2024-08-05 ENCOUNTER — OFFICE VISIT (OUTPATIENT)
Dept: ORTHOPEDICS | Facility: CLINIC | Age: 64
End: 2024-08-05
Payer: MEDICARE

## 2024-08-05 VITALS — BODY MASS INDEX: 37.11 KG/M2 | HEIGHT: 60 IN | WEIGHT: 189 LBS

## 2024-08-05 DIAGNOSIS — M17.11 PRIMARY OSTEOARTHRITIS OF RIGHT KNEE: Primary | ICD-10-CM

## 2024-08-05 PROCEDURE — 99213 OFFICE O/P EST LOW 20 MIN: CPT | Performed by: STUDENT IN AN ORGANIZED HEALTH CARE EDUCATION/TRAINING PROGRAM

## 2024-08-05 NOTE — PROGRESS NOTES
CC: Right Knee Osteoarthritis    HPI: Patient is a 63-year-old female known to my clinic with right knee osteoarthritis.  She is tentatively scheduled for a right total knee arthroplasty on August 21.  She has a known nickel allergy.  Will plan to use nickel free components.  I have provided her with a list of ingredients in the femur and tibial components.  She has cross-referenced this with her allergy list and feels comfortable with the components.  She is also comfortable with the polyethylene and cement components.  She has seen a rheumatologist.  Will plan to hold her abatacept 1 to 2 weeks prior to surgery and 3 to 4 weeks after surgery.  She is currently on 5 mg p.o. of prednisone to control her rheumatoid arthritis symptoms.  Last hemoglobin A1c was 5.9.  We discussed postoperative anticoagulation.  From medical standpoint, aspirin is appropriate blood thinner.  She tells me that she has tolerated aspirin in the past.  Will tentatively plan to use aspirin 1 or 62 mg p.o. for 6 weeks for chemical DVT prophylaxis.    A/P:  Patient is a 63-year-old female known to my practice with right knee osteoarthritis scheduled for total knee arthroplasty on August 21st.  This will be a nickel free implant, MC poly, CR femur.  She will hold her abatacept 1 to 2 weeks prior to surgery and 3 to 4 weeks after surgery.  She will continue to taper prednisone.  Will use aspirin for DVT prophylaxis.  I have the opportunity answer all questions.  She is in agreement with the plan moving forward.    Keon Thomas MD    [facility]   Department of Orthopedic Surgery      Disclaimer: This note consists of symbols derived from keyboarding, dictation and/or voice recognition software. As a result, there may be errors in the script that have gone undetected. Please consider this when interpreting information found in this chart.

## 2024-08-05 NOTE — LETTER
8/5/2024      Maeve Lovell  [address]  [place] [address]      Dear Colleague,    Thank you for referring your patient, Maeve Lovell, to the [facility] [place]. Please see a copy of my visit note below.    CC: Right Knee Osteoarthritis    HPI: Patient is a 63-year-old female known to my clinic with right knee osteoarthritis.  She is tentatively scheduled for a right total knee arthroplasty on August 21.  She has a known nickel allergy.  Will plan to use nickel free components.  I have provided her with a list of ingredients in the femur and tibial components.  She has cross-referenced this with her allergy list and feels comfortable with the components.  She is also comfortable with the polyethylene and cement components.  She has seen a rheumatologist.  Will plan to hold her abatacept 1 to 2 weeks prior to surgery and 3 to 4 weeks after surgery.  She is currently on 5 mg p.o. of prednisone to control her rheumatoid arthritis symptoms.  Last hemoglobin A1c was 5.9.  We discussed postoperative anticoagulation.  From medical standpoint, aspirin is appropriate blood thinner.  She tells me that she has tolerated aspirin in the past.  Will tentatively plan to use aspirin 1 or 62 mg p.o. for 6 weeks for chemical DVT prophylaxis.    A/P:  Patient is a 63-year-old female known to my practice with right knee osteoarthritis scheduled for total knee arthroplasty on August 21st.  This will be a nickel free implant, MC poly, CR femur.  She will hold her abatacept 1 to 2 weeks prior to surgery and 3 to 4 weeks after surgery.  She will continue to taper prednisone.  Will use aspirin for DVT prophylaxis.  I have the opportunity answer all questions.  She is in agreement with the plan moving forward.    Keon Thomas MD    [facility]   Department of Orthopedic Surgery      Disclaimer: This note consists of symbols derived from keyboarding, dictation  and/or voice recognition software. As a result, there may be errors in the script that have gone undetected. Please consider this when interpreting information found in this chart.      Again, thank you for allowing me to participate in the care of your patient.        Sincerely,        Keon Thomas MD

## 2024-08-14 NOTE — PROGRESS NOTES
"HISTORY OF PRESENT ILLNESS:    Maeve Lovell is a 63 year old female who is seen in follow up for ***.  Present symptoms: Pt reports ***.  Treatments include ***.  Using *** for ambulation.  ***.  *** for DVT prophylaxis.  No new complaints.  Denies Chest pain, Calve pain, Fever, Chills.    PHYSICAL EXAM:  There were no vitals taken for this visit.  There is no height or weight on file to calculate BMI.   GENERAL APPEARANCE: {JANUSZ GENERAL APPEARANCE:50::\"healthy\",\"alert\",\"no distress\"}   PSYCH:  {[facility] EXAM CPE - PSYCH:672164::\"mentation appears normal\",\"affect normal/bright\"}    MSK:  {RIGHT:411959} Knee.  Ambulates: ***.  Incision clean and dry, *** present, healing.  Appropriate incisional erythema.   {YES/NO -default:810996::\"No\"} Ecchymosis ***.  {YES/NO -default:280829::\"No\"} calve pain on palpation.  Edema *** at knee ***.  CMS: edvin incisional numbness, otherwise grossly intact.  AROM Flexion ***.  SLR: Able with *** knee extension.    IMAGING INTERPRETATION:  None today.     ASSESSMENT:  Maeve Lovell is a 63 year old female S/P ***.  ***    PLAN:  - Surgery discussed, images reviewed if applicable, and all questions were answered at this time.  - *** removed with sterile technique, steri-strips applied in usual fashion, care instructions given and verbally acknowledged.  - Medications: ***  - Physical Therapy: {REHAB :069228}  - AAT.    Return to clinic 4 weeks for x ray.    Ti Pereira PA-C    Dept. Orthopedic Surgery  [facility]   8/14/2024     "

## 2024-08-16 NOTE — PROGRESS NOTES
Ortho Navigator Note      Pre-op Date 7/29/24     Medical Clearance  Cleared     Labs Stable      COVID Test Date No longer indicated      Skin  Intact      Activity: Ambulates independently with walker and knee scooter     Equipment Need Patient may need a pedestal walker due to poor  strength and pain in hands related to RA.   Appreciate guidance from PT/OT.    Meds to Hold Held all supplements 14 days prior to surgery  Orencia- Last dose 8/8/24  * Medication recommendations are not intended to be exhaustive; they are limited to common medications that are potentially dangerous if incorrectly managed   NPO Instructions  Instructed to stop solids 8 hr prior to arrival and clears 2 hr prior to arrival.       Pre-op Joint Education Complete? Complete   Discharge Plan Patient has plan to discharge home on morning of POD 1.   Her friend is unable to arrive at hospital at 0800 although she will be there to transport patient home. She will stay with the patient for the day.  Patient has other friends that can stop by to check on her but nobody to stay overnight.  She requested info about TCU and criteria was discussed with her.  She feels that she can recover at home safely at this point but will discuss with the team.    /Transportation Friends and neighbors will assist on a as needed basis.      Barriers to Discharge Lack of overnight .      Additional Info/   Special Needs : Patient had no unanswered questions or concerns.           07/30/24 3826   Discharge Planning   Patient/Family Anticipates Transition to home   Concerns to be Addressed all concerns addressed in this encounter   Living Arrangements   People in Home alone   Type of Residence Private Residence   Is your private residence a single family home or apartment? Single family home   Number of Stairs, Within Home, Primary two   Stair Railings, Within Home, Primary railings safe and in good condition   Once home, are you able to live on one  level? Yes   Which level? Main Level   Equipment Currently Used at Home grab bar, tub/shower;grab bar, toilet;shower chair;cane, quad;colostomy/ostomy supplies;walker, rolling;other (see comments)  (Scooter)   Support System   Support Systems Friends/Neighbors   Do you have someone available to stay with you one or two nights once you are home? No  (Friend)   Medical Clearance   Date of Physical 07/29/24   Clinic Name [facility]   It is recommended that you call and check with any specialty providers before surgery to see if you need surgical clearance.  Do you see any specialty providers outside of your primary care provider? Yes  (Will check with her Rhematologist re RA meds)   Blood   Known Bleeding Disorder or Coagulopathy No   Does the patient have any Mormonism/cultural preferences related to blood products? No   Education   Has the patient scheduled or completed pre-op total joint education, either in class or online, in the last 12 months? Yes   Patient attended total joint pre-op class/received pre-op teaching  online

## 2024-08-19 NOTE — PHARMACY-ADMISSION MEDICATION HISTORY
Pre-Admission Medication History  Medication history and patient interview completed by pre-admitting RN or pre-op/PACU RN. Reviewed by pharmacist, including [facility] dispense records, [facility] Care Everywhere, and chart review.       Adrien Fitch, Pharm.D., BCPS      Status Changed by Time of change   Nurse Orly Farr, RN Tue Jul 30, 2024  4:09 PM         Current Facility-Administered Medications for the 8/21/24 encounter (Hospital Encounter)   Medication    1 mL ropivacaine (NAROPIN) injection 5 mg/mL    4 mL ropivacaine (NAROPIN) injection 5 mg/mL    hylan (SYNVISC ONE) injection 48 mg    lidocaine 1 % injection 3 mL    lidocaine 1 % injection 5 mL    lidocaine 1 % injection 5 mL    lidocaine 1 % injection 5 mL    lidocaine 1 % injection 5 mL    methylPREDNISolone (DEPO-Medrol) injection 40 mg    methylPREDNISolone (DEPO-Medrol) injection 40 mg     PTA Med List   Medication Sig Note Last Dose    clobetasol (TEMOVATE) 0.05 % external ointment Use a pea sized amount one to two times weekly as needed  Indications: Inflammation, lichen sclerosus      hydroxychloroquine (PLAQUENIL) 200 MG tablet Take 2 tablets by mouth daily      loperamide (IMODIUM A-D) 2 MG capsule Take 4 mg by mouth 3 times daily Taking 6/day      ONE DAILY MULTIPLE VITAMIN OR       ORENCIA CLICKJECT 125 MG/ML SOAJ auto-injector Inject 125 mg Subcutaneous once a week 7/31/2024: Will hold dose on 8/15/24 for surgery on 8/21/24 8/8/2024    predniSONE (DELTASONE) 5 MG tablet Will be taking 15 mg daily starting 8/14/24 for one week leading up to surgery, then 10mg daily for 1 week, then 5mg daily for 1 week

## 2024-08-21 ENCOUNTER — APPOINTMENT (OUTPATIENT)
Dept: PHYSICAL THERAPY | Facility: CLINIC | Age: 64
End: 2024-08-21
Attending: STUDENT IN AN ORGANIZED HEALTH CARE EDUCATION/TRAINING PROGRAM
Payer: MEDICARE

## 2024-08-21 ENCOUNTER — HOSPITAL ENCOUNTER (OUTPATIENT)
Facility: CLINIC | Age: 64
Discharge: HOME | End: 2024-08-22
Attending: STUDENT IN AN ORGANIZED HEALTH CARE EDUCATION/TRAINING PROGRAM | Admitting: STUDENT IN AN ORGANIZED HEALTH CARE EDUCATION/TRAINING PROGRAM
Payer: MEDICARE

## 2024-08-21 ENCOUNTER — ANESTHESIA (OUTPATIENT)
Dept: SURGERY | Facility: CLINIC | Age: 64
End: 2024-08-21
Payer: MEDICARE

## 2024-08-21 ENCOUNTER — APPOINTMENT (OUTPATIENT)
Dept: GENERAL RADIOLOGY | Facility: CLINIC | Age: 64
End: 2024-08-21
Attending: PHYSICIAN ASSISTANT
Payer: MEDICARE

## 2024-08-21 ENCOUNTER — ANESTHESIA EVENT (OUTPATIENT)
Dept: SURGERY | Facility: CLINIC | Age: 64
End: 2024-08-21
Payer: MEDICARE

## 2024-08-21 DIAGNOSIS — G89.18 ACUTE POST-OPERATIVE PAIN: Primary | ICD-10-CM

## 2024-08-21 DIAGNOSIS — R11.0 NAUSEA: ICD-10-CM

## 2024-08-21 DIAGNOSIS — T40.2X5A CONSTIPATION DUE TO OPIOID THERAPY: ICD-10-CM

## 2024-08-21 DIAGNOSIS — Z78.9 DEEP VEIN THROMBOSIS (DVT) PROPHYLAXIS PRESCRIBED AT DISCHARGE: ICD-10-CM

## 2024-08-21 DIAGNOSIS — Z96.651 S/P TKR (TOTAL KNEE REPLACEMENT) NOT USING CEMENT, RIGHT: ICD-10-CM

## 2024-08-21 DIAGNOSIS — K59.03 CONSTIPATION DUE TO OPIOID THERAPY: ICD-10-CM

## 2024-08-21 PROCEDURE — 97530 THERAPEUTIC ACTIVITIES: CPT | Mod: GP

## 2024-08-21 PROCEDURE — 272N000001 HC OR GENERAL SUPPLY STERILE: Performed by: STUDENT IN AN ORGANIZED HEALTH CARE EDUCATION/TRAINING PROGRAM

## 2024-08-21 PROCEDURE — 258N000001 HC RX 258: Performed by: STUDENT IN AN ORGANIZED HEALTH CARE EDUCATION/TRAINING PROGRAM

## 2024-08-21 PROCEDURE — C1776 JOINT DEVICE (IMPLANTABLE): HCPCS | Performed by: STUDENT IN AN ORGANIZED HEALTH CARE EDUCATION/TRAINING PROGRAM

## 2024-08-21 PROCEDURE — 250N000011 HC RX IP 250 OP 636: Performed by: PHYSICIAN ASSISTANT

## 2024-08-21 PROCEDURE — 250N000025 HC SEVOFLURANE, PER MIN: Performed by: STUDENT IN AN ORGANIZED HEALTH CARE EDUCATION/TRAINING PROGRAM

## 2024-08-21 PROCEDURE — 258N000003 HC RX IP 258 OP 636: Performed by: STUDENT IN AN ORGANIZED HEALTH CARE EDUCATION/TRAINING PROGRAM

## 2024-08-21 PROCEDURE — 250N000009 HC RX 250: Performed by: STUDENT IN AN ORGANIZED HEALTH CARE EDUCATION/TRAINING PROGRAM

## 2024-08-21 PROCEDURE — 250N000011 HC RX IP 250 OP 636

## 2024-08-21 PROCEDURE — 97161 PT EVAL LOW COMPLEX 20 MIN: CPT | Mod: GP

## 2024-08-21 PROCEDURE — 999N000065 XR KNEE PORT RIGHT 1/2 VIEWS: Mod: RT

## 2024-08-21 PROCEDURE — 360N000077 HC SURGERY LEVEL 4, PER MIN: Performed by: STUDENT IN AN ORGANIZED HEALTH CARE EDUCATION/TRAINING PROGRAM

## 2024-08-21 PROCEDURE — 97110 THERAPEUTIC EXERCISES: CPT | Mod: GP

## 2024-08-21 PROCEDURE — 250N000013 HC RX MED GY IP 250 OP 250 PS 637: Performed by: STUDENT IN AN ORGANIZED HEALTH CARE EDUCATION/TRAINING PROGRAM

## 2024-08-21 PROCEDURE — 250N000012 HC RX MED GY IP 250 OP 636 PS 637: Performed by: PHYSICIAN ASSISTANT

## 2024-08-21 PROCEDURE — 250N000011 HC RX IP 250 OP 636: Performed by: STUDENT IN AN ORGANIZED HEALTH CARE EDUCATION/TRAINING PROGRAM

## 2024-08-21 PROCEDURE — 999N000141 HC STATISTIC PRE-PROCEDURE NURSING ASSESSMENT: Performed by: STUDENT IN AN ORGANIZED HEALTH CARE EDUCATION/TRAINING PROGRAM

## 2024-08-21 PROCEDURE — 370N000017 HC ANESTHESIA TECHNICAL FEE, PER MIN: Performed by: STUDENT IN AN ORGANIZED HEALTH CARE EDUCATION/TRAINING PROGRAM

## 2024-08-21 PROCEDURE — 258N000003 HC RX IP 258 OP 636: Performed by: ANESTHESIOLOGY

## 2024-08-21 PROCEDURE — 250N000009 HC RX 250: Performed by: ANESTHESIOLOGY

## 2024-08-21 PROCEDURE — 710N000009 HC RECOVERY PHASE 1, LEVEL 1, PER MIN: Performed by: STUDENT IN AN ORGANIZED HEALTH CARE EDUCATION/TRAINING PROGRAM

## 2024-08-21 PROCEDURE — 250N000013 HC RX MED GY IP 250 OP 250 PS 637: Performed by: PHYSICIAN ASSISTANT

## 2024-08-21 PROCEDURE — 258N000003 HC RX IP 258 OP 636

## 2024-08-21 PROCEDURE — 250N000009 HC RX 250

## 2024-08-21 PROCEDURE — C1713 ANCHOR/SCREW BN/BN,TIS/BN: HCPCS | Performed by: STUDENT IN AN ORGANIZED HEALTH CARE EDUCATION/TRAINING PROGRAM

## 2024-08-21 DEVICE — IMPLANTABLE DEVICE
Type: IMPLANTABLE DEVICE | Site: KNEE | Status: FUNCTIONAL
Brand: PERSONA®

## 2024-08-21 DEVICE — IMPLANTABLE DEVICE
Type: IMPLANTABLE DEVICE | Site: KNEE | Status: FUNCTIONAL
Brand: PERSONA® NATURAL TIBIA®

## 2024-08-21 DEVICE — SIMPLEX® HV IS A FAST-SETTING ACRYLIC RESIN FOR USE IN BONE SURGERY. MIXING THE TWO SEPARATE STERILE COMPONENTS PRODUCES A DUCTILE BONE CEMENT WHICH, AFTER HARDENING, FIXES THE IMPLANT AND TRANSFERS STRESSES PRODUCED DURING MOVEMENT EVENLY TO THE BONE. SIMPLEX® HV CEMENT POWDER ALSO CONTAINS INSOLUBLE ZIRCONIUM DIOXIDE AS AN X-RAY CONTRAST MEDIUM. SIMPLEX® HV DOES NOT EMIT A SIGNAL AND DOES NOT POSE A SAFETY RISK IN A MAGNETIC RESONANCE ENVIRONMENT.
Type: IMPLANTABLE DEVICE | Site: KNEE | Status: FUNCTIONAL
Brand: SIMPLEX HV

## 2024-08-21 DEVICE — IMPLANTABLE DEVICE
Type: IMPLANTABLE DEVICE | Site: KNEE | Status: FUNCTIONAL
Brand: PERSONA® VIVACIT-E®

## 2024-08-21 RX ORDER — DEXAMETHASONE SODIUM PHOSPHATE 4 MG/ML
4 INJECTION, SOLUTION INTRA-ARTICULAR; INTRALESIONAL; INTRAMUSCULAR; INTRAVENOUS; SOFT TISSUE
Status: DISCONTINUED | OUTPATIENT
Start: 2024-08-21 | End: 2024-08-21 | Stop reason: HOSPADM

## 2024-08-21 RX ORDER — CEFAZOLIN SODIUM/WATER 2 G/20 ML
2 SYRINGE (ML) INTRAVENOUS
Status: COMPLETED | OUTPATIENT
Start: 2024-08-21 | End: 2024-08-21

## 2024-08-21 RX ORDER — HYDRALAZINE HYDROCHLORIDE 20 MG/ML
2.5-5 INJECTION INTRAMUSCULAR; INTRAVENOUS EVERY 10 MIN PRN
Status: DISCONTINUED | OUTPATIENT
Start: 2024-08-21 | End: 2024-08-21 | Stop reason: HOSPADM

## 2024-08-21 RX ORDER — NALOXONE HYDROCHLORIDE 0.4 MG/ML
0.4 INJECTION, SOLUTION INTRAMUSCULAR; INTRAVENOUS; SUBCUTANEOUS
Status: DISCONTINUED | OUTPATIENT
Start: 2024-08-21 | End: 2024-08-22 | Stop reason: HOSPADM

## 2024-08-21 RX ORDER — ONDANSETRON 2 MG/ML
4 INJECTION INTRAMUSCULAR; INTRAVENOUS EVERY 6 HOURS PRN
Status: DISCONTINUED | OUTPATIENT
Start: 2024-08-21 | End: 2024-08-22 | Stop reason: HOSPADM

## 2024-08-21 RX ORDER — ONDANSETRON 4 MG/1
4 TABLET, ORALLY DISINTEGRATING ORAL EVERY 30 MIN PRN
Status: DISCONTINUED | OUTPATIENT
Start: 2024-08-21 | End: 2024-08-21 | Stop reason: HOSPADM

## 2024-08-21 RX ORDER — LOPERAMIDE HCL 2 MG
4 CAPSULE ORAL 4 TIMES DAILY PRN
Status: DISCONTINUED | OUTPATIENT
Start: 2024-08-21 | End: 2024-08-22 | Stop reason: HOSPADM

## 2024-08-21 RX ORDER — CEFAZOLIN SODIUM 2 G/100ML
2 INJECTION, SOLUTION INTRAVENOUS EVERY 8 HOURS
Status: COMPLETED | OUTPATIENT
Start: 2024-08-21 | End: 2024-08-22

## 2024-08-21 RX ORDER — PROPOFOL 10 MG/ML
INJECTION, EMULSION INTRAVENOUS CONTINUOUS PRN
Status: DISCONTINUED | OUTPATIENT
Start: 2024-08-21 | End: 2024-08-21

## 2024-08-21 RX ORDER — HYDROMORPHONE HCL IN WATER/PF 6 MG/30 ML
0.2 PATIENT CONTROLLED ANALGESIA SYRINGE INTRAVENOUS
Status: DISCONTINUED | OUTPATIENT
Start: 2024-08-21 | End: 2024-08-22 | Stop reason: HOSPADM

## 2024-08-21 RX ORDER — POLYETHYLENE GLYCOL 3350 17 G/17G
1 POWDER, FOR SOLUTION ORAL DAILY
Qty: 7 PACKET | Refills: 0 | Status: SHIPPED | OUTPATIENT
Start: 2024-08-21

## 2024-08-21 RX ORDER — SODIUM CHLORIDE, SODIUM LACTATE, POTASSIUM CHLORIDE, CALCIUM CHLORIDE 600; 310; 30; 20 MG/100ML; MG/100ML; MG/100ML; MG/100ML
INJECTION, SOLUTION INTRAVENOUS CONTINUOUS
Status: DISCONTINUED | OUTPATIENT
Start: 2024-08-21 | End: 2024-08-22 | Stop reason: HOSPADM

## 2024-08-21 RX ORDER — LIDOCAINE 40 MG/G
CREAM TOPICAL
Status: DISCONTINUED | OUTPATIENT
Start: 2024-08-21 | End: 2024-08-22 | Stop reason: HOSPADM

## 2024-08-21 RX ORDER — PREDNISONE 10 MG/1
10 TABLET ORAL DAILY
Status: DISCONTINUED | OUTPATIENT
Start: 2024-08-21 | End: 2024-08-22 | Stop reason: HOSPADM

## 2024-08-21 RX ORDER — LIDOCAINE 40 MG/G
CREAM TOPICAL
Status: DISCONTINUED | OUTPATIENT
Start: 2024-08-21 | End: 2024-08-21 | Stop reason: HOSPADM

## 2024-08-21 RX ORDER — HYDROMORPHONE HCL IN WATER/PF 6 MG/30 ML
0.4 PATIENT CONTROLLED ANALGESIA SYRINGE INTRAVENOUS
Status: DISCONTINUED | OUTPATIENT
Start: 2024-08-21 | End: 2024-08-22 | Stop reason: HOSPADM

## 2024-08-21 RX ORDER — ONDANSETRON 2 MG/ML
4 INJECTION INTRAMUSCULAR; INTRAVENOUS EVERY 30 MIN PRN
Status: DISCONTINUED | OUTPATIENT
Start: 2024-08-21 | End: 2024-08-21 | Stop reason: HOSPADM

## 2024-08-21 RX ORDER — CALCIUM CARBONATE 500(1250)
1 TABLET ORAL 2 TIMES DAILY
Status: DISCONTINUED | OUTPATIENT
Start: 2024-08-21 | End: 2024-08-22 | Stop reason: HOSPADM

## 2024-08-21 RX ORDER — ONDANSETRON 2 MG/ML
INJECTION INTRAMUSCULAR; INTRAVENOUS PRN
Status: DISCONTINUED | OUTPATIENT
Start: 2024-08-21 | End: 2024-08-21

## 2024-08-21 RX ORDER — PROCHLORPERAZINE MALEATE 5 MG
5 TABLET ORAL EVERY 6 HOURS PRN
Status: DISCONTINUED | OUTPATIENT
Start: 2024-08-21 | End: 2024-08-22 | Stop reason: HOSPADM

## 2024-08-21 RX ORDER — SODIUM CHLORIDE, SODIUM LACTATE, POTASSIUM CHLORIDE, CALCIUM CHLORIDE 600; 310; 30; 20 MG/100ML; MG/100ML; MG/100ML; MG/100ML
INJECTION, SOLUTION INTRAVENOUS CONTINUOUS
Status: DISCONTINUED | OUTPATIENT
Start: 2024-08-21 | End: 2024-08-21 | Stop reason: HOSPADM

## 2024-08-21 RX ORDER — FENTANYL CITRATE 50 UG/ML
INJECTION, SOLUTION INTRAMUSCULAR; INTRAVENOUS PRN
Status: DISCONTINUED | OUTPATIENT
Start: 2024-08-21 | End: 2024-08-21

## 2024-08-21 RX ORDER — BIOTIN 1 MG
5000 TABLET ORAL DAILY
Status: DISCONTINUED | OUTPATIENT
Start: 2024-08-21 | End: 2024-08-21

## 2024-08-21 RX ORDER — ASPIRIN 81 MG/1
81 TABLET ORAL 2 TIMES DAILY
Status: DISCONTINUED | OUTPATIENT
Start: 2024-08-21 | End: 2024-08-22 | Stop reason: HOSPADM

## 2024-08-21 RX ORDER — ONDANSETRON 4 MG/1
4 TABLET, ORALLY DISINTEGRATING ORAL EVERY 6 HOURS PRN
Status: DISCONTINUED | OUTPATIENT
Start: 2024-08-21 | End: 2024-08-22 | Stop reason: HOSPADM

## 2024-08-21 RX ORDER — BISACODYL 10 MG
10 SUPPOSITORY, RECTAL RECTAL DAILY PRN
Status: DISCONTINUED | OUTPATIENT
Start: 2024-08-24 | End: 2024-08-22 | Stop reason: HOSPADM

## 2024-08-21 RX ORDER — AMOXICILLIN 250 MG
1-2 CAPSULE ORAL 2 TIMES DAILY
Qty: 30 TABLET | Refills: 0 | Status: SHIPPED | OUTPATIENT
Start: 2024-08-21

## 2024-08-21 RX ORDER — GABAPENTIN 100 MG/1
CAPSULE ORAL
Qty: 14 CAPSULE | Refills: 0 | Status: SHIPPED | OUTPATIENT
Start: 2024-08-21 | End: 2024-08-27

## 2024-08-21 RX ORDER — LABETALOL HYDROCHLORIDE 5 MG/ML
10 INJECTION, SOLUTION INTRAVENOUS
Status: DISCONTINUED | OUTPATIENT
Start: 2024-08-21 | End: 2024-08-21 | Stop reason: HOSPADM

## 2024-08-21 RX ORDER — HYDROXYCHLOROQUINE SULFATE 200 MG/1
400 TABLET, FILM COATED ORAL DAILY
Status: DISCONTINUED | OUTPATIENT
Start: 2024-08-21 | End: 2024-08-22 | Stop reason: HOSPADM

## 2024-08-21 RX ORDER — NALOXONE HYDROCHLORIDE 0.4 MG/ML
0.2 INJECTION, SOLUTION INTRAMUSCULAR; INTRAVENOUS; SUBCUTANEOUS
Status: DISCONTINUED | OUTPATIENT
Start: 2024-08-21 | End: 2024-08-22 | Stop reason: HOSPADM

## 2024-08-21 RX ORDER — GABAPENTIN 100 MG/1
100 CAPSULE ORAL AT BEDTIME
Status: DISCONTINUED | OUTPATIENT
Start: 2024-08-21 | End: 2024-08-22 | Stop reason: HOSPADM

## 2024-08-21 RX ORDER — LIDOCAINE HYDROCHLORIDE 20 MG/ML
INJECTION, SOLUTION INFILTRATION; PERINEURAL PRN
Status: DISCONTINUED | OUTPATIENT
Start: 2024-08-21 | End: 2024-08-21

## 2024-08-21 RX ORDER — HYDROMORPHONE HCL IN WATER/PF 6 MG/30 ML
0.2 PATIENT CONTROLLED ANALGESIA SYRINGE INTRAVENOUS EVERY 5 MIN PRN
Status: DISCONTINUED | OUTPATIENT
Start: 2024-08-21 | End: 2024-08-21 | Stop reason: HOSPADM

## 2024-08-21 RX ORDER — POLYETHYLENE GLYCOL 3350 17 G/17G
17 POWDER, FOR SOLUTION ORAL DAILY
Status: DISCONTINUED | OUTPATIENT
Start: 2024-08-22 | End: 2024-08-22 | Stop reason: HOSPADM

## 2024-08-21 RX ORDER — ACETAMINOPHEN 325 MG/1
650 TABLET ORAL EVERY 4 HOURS PRN
Qty: 100 TABLET | Refills: 0 | Status: SHIPPED | OUTPATIENT
Start: 2024-08-21

## 2024-08-21 RX ORDER — CEFAZOLIN SODIUM/WATER 2 G/20 ML
2 SYRINGE (ML) INTRAVENOUS SEE ADMIN INSTRUCTIONS
Status: DISCONTINUED | OUTPATIENT
Start: 2024-08-21 | End: 2024-08-21 | Stop reason: HOSPADM

## 2024-08-21 RX ORDER — HYDROXYZINE HYDROCHLORIDE 10 MG/1
10 TABLET, FILM COATED ORAL EVERY 6 HOURS PRN
Status: DISCONTINUED | OUTPATIENT
Start: 2024-08-21 | End: 2024-08-22 | Stop reason: HOSPADM

## 2024-08-21 RX ORDER — ACETAMINOPHEN 325 MG/1
975 TABLET ORAL EVERY 8 HOURS
Status: DISCONTINUED | OUTPATIENT
Start: 2024-08-21 | End: 2024-08-22 | Stop reason: HOSPADM

## 2024-08-21 RX ORDER — VALACYCLOVIR HYDROCHLORIDE 500 MG/1
500 TABLET, FILM COATED ORAL 2 TIMES DAILY PRN
Status: DISCONTINUED | OUTPATIENT
Start: 2024-08-21 | End: 2024-08-22 | Stop reason: HOSPADM

## 2024-08-21 RX ORDER — ASPIRIN 81 MG/1
81 TABLET ORAL 2 TIMES DAILY
Qty: 60 TABLET | Refills: 0 | Status: SHIPPED | OUTPATIENT
Start: 2024-08-21

## 2024-08-21 RX ORDER — OXYCODONE HYDROCHLORIDE 5 MG/1
5-10 TABLET ORAL EVERY 4 HOURS PRN
Qty: 26 TABLET | Refills: 0 | Status: SHIPPED | OUTPATIENT
Start: 2024-08-21 | End: 2024-08-23

## 2024-08-21 RX ORDER — FENTANYL CITRATE 50 UG/ML
50 INJECTION, SOLUTION INTRAMUSCULAR; INTRAVENOUS EVERY 5 MIN PRN
Status: DISCONTINUED | OUTPATIENT
Start: 2024-08-21 | End: 2024-08-21 | Stop reason: HOSPADM

## 2024-08-21 RX ORDER — ALBUTEROL SULFATE 0.83 MG/ML
2.5 SOLUTION RESPIRATORY (INHALATION) EVERY 4 HOURS PRN
Status: DISCONTINUED | OUTPATIENT
Start: 2024-08-21 | End: 2024-08-21 | Stop reason: HOSPADM

## 2024-08-21 RX ORDER — BUPIVACAINE HYDROCHLORIDE AND EPINEPHRINE 2.5; 5 MG/ML; UG/ML
INJECTION, SOLUTION INFILTRATION; PERINEURAL
Status: COMPLETED | OUTPATIENT
Start: 2024-08-21 | End: 2024-08-21

## 2024-08-21 RX ORDER — FENTANYL CITRATE 50 UG/ML
25 INJECTION, SOLUTION INTRAMUSCULAR; INTRAVENOUS EVERY 5 MIN PRN
Status: DISCONTINUED | OUTPATIENT
Start: 2024-08-21 | End: 2024-08-21 | Stop reason: HOSPADM

## 2024-08-21 RX ORDER — DEXAMETHASONE SODIUM PHOSPHATE 4 MG/ML
INJECTION, SOLUTION INTRA-ARTICULAR; INTRALESIONAL; INTRAMUSCULAR; INTRAVENOUS; SOFT TISSUE PRN
Status: DISCONTINUED | OUTPATIENT
Start: 2024-08-21 | End: 2024-08-21

## 2024-08-21 RX ORDER — TRANEXAMIC ACID 650 MG/1
1950 TABLET ORAL ONCE
Status: COMPLETED | OUTPATIENT
Start: 2024-08-21 | End: 2024-08-21

## 2024-08-21 RX ORDER — OXYCODONE HYDROCHLORIDE 5 MG/1
5 TABLET ORAL EVERY 4 HOURS PRN
Status: DISCONTINUED | OUTPATIENT
Start: 2024-08-21 | End: 2024-08-22 | Stop reason: HOSPADM

## 2024-08-21 RX ORDER — AMOXICILLIN 250 MG
1 CAPSULE ORAL 2 TIMES DAILY
Status: DISCONTINUED | OUTPATIENT
Start: 2024-08-21 | End: 2024-08-22 | Stop reason: HOSPADM

## 2024-08-21 RX ORDER — NALOXONE HYDROCHLORIDE 0.4 MG/ML
0.1 INJECTION, SOLUTION INTRAMUSCULAR; INTRAVENOUS; SUBCUTANEOUS
Status: DISCONTINUED | OUTPATIENT
Start: 2024-08-21 | End: 2024-08-21 | Stop reason: HOSPADM

## 2024-08-21 RX ORDER — PROPOFOL 10 MG/ML
INJECTION, EMULSION INTRAVENOUS PRN
Status: DISCONTINUED | OUTPATIENT
Start: 2024-08-21 | End: 2024-08-21

## 2024-08-21 RX ORDER — HYDROMORPHONE HCL IN WATER/PF 6 MG/30 ML
0.4 PATIENT CONTROLLED ANALGESIA SYRINGE INTRAVENOUS EVERY 5 MIN PRN
Status: DISCONTINUED | OUTPATIENT
Start: 2024-08-21 | End: 2024-08-21 | Stop reason: HOSPADM

## 2024-08-21 RX ORDER — ACETAMINOPHEN 325 MG/1
650 TABLET ORAL EVERY 4 HOURS PRN
Status: DISCONTINUED | OUTPATIENT
Start: 2024-08-24 | End: 2024-08-22 | Stop reason: HOSPADM

## 2024-08-21 RX ORDER — CALCIUM CARBONATE 500(1250)
1 TABLET ORAL 2 TIMES DAILY
COMMUNITY

## 2024-08-21 RX ORDER — BIOTIN 1 MG
5000 TABLET ORAL DAILY
COMMUNITY

## 2024-08-21 RX ORDER — OXYCODONE HYDROCHLORIDE 10 MG/1
10 TABLET ORAL EVERY 4 HOURS PRN
Status: DISCONTINUED | OUTPATIENT
Start: 2024-08-21 | End: 2024-08-22 | Stop reason: HOSPADM

## 2024-08-21 RX ADMIN — MIDAZOLAM 2 MG: 1 INJECTION INTRAMUSCULAR; INTRAVENOUS at 10:58

## 2024-08-21 RX ADMIN — FENTANYL CITRATE 100 MCG: 50 INJECTION INTRAMUSCULAR; INTRAVENOUS at 11:03

## 2024-08-21 RX ADMIN — OXYCODONE HYDROCHLORIDE 5 MG: 5 TABLET ORAL at 20:23

## 2024-08-21 RX ADMIN — ONDANSETRON 4 MG: 2 INJECTION INTRAMUSCULAR; INTRAVENOUS at 11:57

## 2024-08-21 RX ADMIN — SODIUM CHLORIDE, POTASSIUM CHLORIDE, SODIUM LACTATE AND CALCIUM CHLORIDE: 600; 310; 30; 20 INJECTION, SOLUTION INTRAVENOUS at 13:18

## 2024-08-21 RX ADMIN — ACETAMINOPHEN 975 MG: 325 TABLET, FILM COATED ORAL at 17:17

## 2024-08-21 RX ADMIN — GABAPENTIN 100 MG: 100 CAPSULE ORAL at 22:40

## 2024-08-21 RX ADMIN — Medication 2 G: at 10:56

## 2024-08-21 RX ADMIN — TRANEXAMIC ACID 1 G: 1 INJECTION, SOLUTION INTRAVENOUS at 10:59

## 2024-08-21 RX ADMIN — SODIUM CHLORIDE, POTASSIUM CHLORIDE, SODIUM LACTATE AND CALCIUM CHLORIDE: 600; 310; 30; 20 INJECTION, SOLUTION INTRAVENOUS at 10:31

## 2024-08-21 RX ADMIN — TRANEXAMIC ACID 1 G: 1 INJECTION, SOLUTION INTRAVENOUS at 13:07

## 2024-08-21 RX ADMIN — PREDNISONE 10 MG: 10 TABLET ORAL at 17:48

## 2024-08-21 RX ADMIN — CALCIUM 500 MG: 500 TABLET ORAL at 20:23

## 2024-08-21 RX ADMIN — PROPOFOL 50 MG: 10 INJECTION, EMULSION INTRAVENOUS at 11:26

## 2024-08-21 RX ADMIN — HYDROCORTISONE SODIUM SUCCINATE 100 MG: 100 INJECTION, POWDER, FOR SOLUTION INTRAMUSCULAR; INTRAVENOUS at 11:09

## 2024-08-21 RX ADMIN — BUPIVACAINE HYDROCHLORIDE AND EPINEPHRINE BITARTRATE 20 ML: 2.5; .005 INJECTION, SOLUTION INFILTRATION; PERINEURAL at 10:22

## 2024-08-21 RX ADMIN — HYDROMORPHONE HYDROCHLORIDE 1 MG: 1 INJECTION, SOLUTION INTRAMUSCULAR; INTRAVENOUS; SUBCUTANEOUS at 11:26

## 2024-08-21 RX ADMIN — PROPOFOL 200 MG: 10 INJECTION, EMULSION INTRAVENOUS at 11:03

## 2024-08-21 RX ADMIN — PROPOFOL 50 MCG/KG/MIN: 10 INJECTION, EMULSION INTRAVENOUS at 11:03

## 2024-08-21 RX ADMIN — CEFAZOLIN SODIUM 2 G: 2 INJECTION, SOLUTION INTRAVENOUS at 17:33

## 2024-08-21 RX ADMIN — PHENYLEPHRINE HYDROCHLORIDE 100 MCG: 10 INJECTION INTRAVENOUS at 12:14

## 2024-08-21 RX ADMIN — LIDOCAINE HYDROCHLORIDE 50 MG: 20 INJECTION, SOLUTION INFILTRATION; PERINEURAL at 11:03

## 2024-08-21 RX ADMIN — DEXAMETHASONE SODIUM PHOSPHATE 4 MG: 4 INJECTION, SOLUTION INTRA-ARTICULAR; INTRALESIONAL; INTRAMUSCULAR; INTRAVENOUS; SOFT TISSUE at 11:03

## 2024-08-21 RX ADMIN — ASPIRIN 81 MG: 81 TABLET, COATED ORAL at 20:23

## 2024-08-21 RX ADMIN — HYDROXYCHLOROQUINE SULFATE 400 MG: 200 TABLET ORAL at 17:48

## 2024-08-21 ASSESSMENT — ACTIVITIES OF DAILY LIVING (ADL)
ADLS_ACUITY_SCORE: 22
ADLS_ACUITY_SCORE: 27
ADLS_ACUITY_SCORE: 22
ADLS_ACUITY_SCORE: 22
ADLS_ACUITY_SCORE: 27
ADLS_ACUITY_SCORE: 22
ADLS_ACUITY_SCORE: 27
ADLS_ACUITY_SCORE: 22

## 2024-08-21 NOTE — PROGRESS NOTES
R groin discomfort. Slight redness noted in groin, crease where underwear lands. No broken skin. Ice applied for discomfort.    Also complaining of R foot numbness. Dr Stewart notified. May be due to positioning. Continue to monitor. Pt is able to move R foot, good dorsal/plantar flexion.

## 2024-08-21 NOTE — ANESTHESIA PROCEDURE NOTES
Airway       Patient location during procedure: OR       Procedure Start/Stop Times: 8/21/2024 11:06 AM  Staff -        Anesthesiologist:  Ariana Stewart MD       CRNA: Bong Phan APRN CRNA       Performed By: CRNA  Consent for Airway        Urgency: elective  Indications and Patient Condition       Indications for airway management: edvin-procedural       Induction type:intravenous       Mask difficulty assessment: 1 - vent by mask    Final Airway Details       Final airway type: supraglottic airway    Supraglottic Airway Details        Type: LMA       Brand: I-Gel       LMA size: 4    Post intubation assessment        Placement verified by: capnometry, equal breath sounds and chest rise        Number of attempts at approach: 1       Number of other approaches attempted: 0       Secured with: commercial tube fishman       Ease of procedure: easy       Dentition: Intact and Unchanged    Medication(s) Administered   Medication Administration Time: 8/21/2024 11:06 AM

## 2024-08-21 NOTE — OP NOTE
Date of Surgery: August 21, 2024    Surgeon: Keon Thomas MD     Assistants:   1. Faustino Pereira PA-C  2. Pat Cazares, PGY-3    A skilled surgical assistant was required to assist with patient positioning, draping, and retraction during open portions of the case.    Pre-operative Diagnosis:   1) Right Knee Osteoarthritis    Post-operative Diagnosis:   1)Right Knee Osteoarthritis    Procedure:   1) Right Total Knee Arthroplasty     Implants:  Bern[employer] Persona CR size 6 narrow Titanium Femur,  D titanium tibia, 29mm Patella, 11mm MC polyethylene, High Viscocity Bone Cement.    Anesthesia: General with Adductor Canal Block    Estimated Blood Loss: 100 ml       Complications: None       Specimen: None    Tourniquette Time: 106 min    Condition: Stable to PACU    Procedure Details   Indications for Procedure:  Patient is a 65 yo known to my practice with right knee osteoarthritis.  The patient is still having pain affecting her activities of daily living despite nonoperative management which may have included, but not limited to low impact aerobic exercises, weight loss, oral anti-inflammatory medication, intra-articular injections, and bracing.  Operative and non-operative treatment options have been discussed.  Informed consent was obtained after discussing the risks and benefits of the procedure which include but were not limited to failure to cure pain, stiffness, instability, hardware failure, infection that may require multiple surgeries, post-operative DVT/PE, damage to nearby nerves, arteries, and veins that may lead to loss of function of the limb, and death.  The patient understood the risks and wished to proceed with surgery.     Procedure:   On the day of surgery, the patient was identified in the preoperative holding area and the operative site was confirmed and marked with a permanent skin marker. Informed consent was again reviewed confirming the patient, procedure, site, laterality, and surgeon. We  again discussed the risks and benefits of the procedure. All questions were answered at this time.  A regional anesthetic block was performed by my anesthesia colleagues.      The patient was transported to the operative suite, transferred to the operative table without incident and secured using a belt.  Anesthesia was induced by our anesthesia colleagues. The patient was placed in the supine position. All bony prominences were padded.  A right thigh tourniquet was placed.  The operative leg was sterilely prepped and draped in the usual fashion. Final timeout was conducted. All in the room were in agreement with the correct patient identification, procedure, site, laterality, and surgeon, as well as preoperative antibiotics having been instilled.       The leg was examined and found to have a 0 degree flexion contracture and stable in varus and valgus. The leg was exsanguinated with an Esmarch bandage and the tourniquette was inflated to 250mmHg. An  medial parapatellar approach to the knee was used. A vertical incision was made, centered over the patella. Large skin flaps were raised. A medial parapatellar capsulotomy was performed to enter the joint. Limited medial soft tissue release was performed on the proximal tibia. Portions of the anterior fate pad were removed.    The patella was reflected laterally and the knee was examined. There was found to be severe tri-compartmental arthritic changes. The ACL was removed with use of the bovie. A partial medial and lateral menisectomy was performed with the bovie. An intramedullary waldo was placed and 5 degree valgus intramedullary femoral guide system was used to make the distal femoral cut. A posterior reference system was used to reference rotation and femoral size. 3 degrees of external rotation was selected. A size 6 narrow femoral component was measured. The 4-in-1 cutting guide was used to complete anterior, posterior, and chamfer cuts. The cutting guide was  removed with all bone fragment.    We then turned our attention to the tibia. The tibia was subluxed anteriorly. An extramedullary tibial cutting guide was used to make the proximal tibia cut. The proximal tibial cut was made with 7 degrees of posterior slope. The bone fragment was removed. The remained of the medial and lateral menisci were removed. At this time any large posterior femoral osteophytes or loose bodies were removed from the knee. 10cc of local field block anesthetic was injected into the medial and lateral posterior joint capsule. A 10mm trial block was used to assess the soft tissue gap and alignment in flexion, extension, and mid-flexion. These were found to be satisfactory.     We then placed the trial femoral component and trial tibial component with a 10mm poly trial. The knee was tested for stability, range of motion, ligamentous balance, and patellar tracking in flexion, extension, and midflexion. These were found to be satisfactory. Range of motion was 0-130 degrees of knee flexion.     We then turned ourt attention to the patella. The patellar width was measured. A free hand patella cut was performed. The bone fragment was removed and the patellar button size was measure. A 28mm patellar component was selected. The appropriate size drill guise was placed on the medial aspect and the patellar lug holes were drilled. The trial tibial button was placed. With all trial components intact, the knee was tested for stability, range of motion, ligamentous balance, and patellar tracking in flexion, extension, and midflexion. These were found to be satisfactory. The femur lug holes were drilled. The rotation of the tibial component was marked with a bovie.    The trials were then removed. The tibia was again subluxed anteriorly.  The tibia was sized.  A size D tibial trial component was selected.  This was placed on the surface of the tibia and appropriate rotation.  No overlying off the surface of the  tibia was noted.  The trial component was pinned in place.  The appropriately sized reamer was utilized to enter the tibial canal.  The appropriate sized keel punch was used through the tibial guide.  The tibial trial guide was removed.    All trial components were removed. The femur, tibia, and patella were copiously irrigated and dried. The high viscosity Simplex cement was vacuum mixed with 500mg Vancomycin. The femoral, tibial, and patellar components were cemented into place. A10mm polyethylene trial was inserted. Excess cement was removed. Thee knee was held in extension while the cement dried. During this time the knee was thoroughly irrigated with a diluted Iodine solution and normal saline irrigation. The remainder of the edvin-articular anesthetic field block was injected.. Once the cement has dried, the knee was again ranged with the 10, 11, and 12mm polyethylene trial component in place. A size 11 mm MC polyethylene was selected. After clearing the tibial tray of any remaining debris or soft tissue, the final polyethylene component was snapped into place on the tibial tray.    With all final components in place, the knee was tested for stability, range of motion, ligamentous balance, and patellar tracking in flexion, extension, and midflexion. These were found to be satisfactory. Range of motion was 0-130 degrees of knee flexion.    The wound was irrigated copiously with diluted ancef wash and normal saline. The tourniquet was deflated. Any pulsatile bleeding was coagulated with the bovie. The capsule was closed with #1 StrataFix. The subdermal tissues were closed in layered fashion using 0 Vicryl, 2-0 Vicryl, and 3-0 Monocryl for the skin. Steristrips were applied. An Aquacell Dressing was placed and an Ace wrap. The drapes were taken down and the patient was awoken from anesthesia. The patient was successfully transferred back to the hospital bed from the operative table without incident. The patient  returned to PACU in stable condition.     All sponge needle instrument counts were correct at the end of the case.     Post Op Plan:    The patient will be admitted to the orthopedic floor for routine post-operative care.    Weight-bearing as tolerated on the operative leg with no range of motion restrictions.    Aspirin for chemical DVT prophylaxis   PO and IV pain control   24-hours of postop antibiotics   PT and OT   Discharge planning    Patient will be discharged when meeting criteria. Follow-up with Dr Thomas in 10 - 14 days.    Keon Thomas MD    [facility]   Department of Orthopedic Surgery

## 2024-08-21 NOTE — PROGRESS NOTES
08/21/24 6511   Appointment Info   Signing Clinician's Name / Credentials (PT) CONOR Goyal   Student Supervision Direct supervision provided;Direct Patient Contact Provided;Therapy services provided with the co-signing licensed therapist guiding and directing the services, and providing the skilled judgement and assessment throughout the session   Living Environment   People in Home alone   Current Living Arrangements house   Home Accessibility stairs within home   Number of Stairs, Within Home, Primary two   Stair Railings, Within Home, Primary railings safe and in good condition   Transportation Anticipated family or friend will provide   Living Environment Comments Pt has 2 platform stairs   Self-Care   Usual Activity Tolerance moderate   Current Activity Tolerance poor   Regular Exercise No   Equipment Currently Used at Home cane, straight;grab bar, tub/shower;colostomy/ostomy supplies;walker, rolling;grab bar, toilet   Fall history within last six months no   Activity/Exercise/Self-Care Comment Patient has rheumatoid arthritis, concerned about ability to use a walker, may need platform walker.  Pt will not have someone available to help her 24/7, however has neighbors, friends that live nearby, and a family member to call if she needs assistance. Will have support for the first day back home.   General Information   Onset of Illness/Injury or Date of Surgery 08/21/24   Referring Physician Ti Pereira, PA-C   Patient/Family Therapy Goals Statement (PT) return home   Pertinent History of Current Problem (include personal factors and/or comorbidities that impact the POC) POD #0 s/p R TKA   Existing Precautions/Restrictions fall;weight bearing   Weight-Bearing Status - RLE weight-bearing as tolerated   Cognition   Affect/Mental Status (Cognition) WNL;anxious   Orientation Status (Cognition) oriented x 4   Follows Commands (Cognition) WNL   Pain Assessment   Patient Currently in Pain Yes,  see Vital Sign flowsheet  (Pt reporting pain in R knee with mobility)   Integumentary/Edema   Integumentary/Edema other (describe)   Integumentary/Edema Comments Surgical site covered by ace wrap   Posture    Posture Protracted shoulders   Range of Motion (ROM)   Range of Motion ROM deficits secondary to pain;ROM deficits secondary to surgical procedure;ROM deficits secondary to swelling   Strength (Manual Muscle Testing)   Strength (Manual Muscle Testing) Deficits observed during functional mobility   Bed Mobility   Comment, (Bed Mobility) mod I with use of bedrails for supine<>sit   Transfers   Comment, (Transfers) CGA w/ FWW for sit<>stand   Gait/Stairs (Locomotion)   Comment, (Gait/Stairs) Pt not appropriate to amb at eval d/t right knee buckling and numbness reported at right foot   Balance   Balance other (describe)   Balance Comments knee buckling observed, pain, requires walker for maintaining balance in standing, remains fall risk   Sensory Examination   Sensory Perception other (describe)   Sensory Perception Comments Pt reporting numbness in lateral portion of R foot and lower R LE. Pt able to move toes and feel pressure.   Coordination   Coordination no deficits were identified   Muscle Tone   Muscle Tone no deficits were identified   Clinical Impression   Criteria for Skilled Therapeutic Intervention Yes, treatment indicated   PT Diagnosis (PT) Impaired functional mobility   Influenced by the following impairments pain, decreased tolerance to activity, IND with mobility, balance, strength, and ROM.   Functional limitations due to impairments bed mobility, transfers, ambulation, stair negotiation   Clinical Presentation (PT Evaluation Complexity) stable   Clinical Presentation Rationale clinical judgement   Clinical Decision Making (Complexity) low complexity   Planned Therapy Interventions (PT) balance training;bed mobility training;gait training;patient/family education;strengthening;stair  training;transfer training;progressive activity/exercise;home exercise program   Risk & Benefits of therapy have been explained evaluation/treatment results reviewed;care plan/treatment goals reviewed;risks/benefits reviewed;current/potential barriers reviewed;participants voiced agreement with care plan;participants included;patient   PT Total Evaluation Time   PT Eval, Low Complexity Minutes (88710) 10   Physical Therapy Goals   PT Frequency 2x/day   PT Predicted Duration/Target Date for Goal Attainment 08/24/24   PT Goals Bed Mobility;Transfers;Gait;Stairs   PT: Bed Mobility Modified independent;Supine to/from sit;Rolling;Bridging;Within precautions;Goal Met   PT: Transfers Modified independent;Sit to/from stand;Bed to/from chair;Assistive device;Within precautions   PT: Gait Modified independent;Assistive device;Standard walker;Within precautions;150 feet   PT: Stairs Modified independent;Assistive device;2 stairs;Within precautions   Interventions   Interventions Quick Adds Therapeutic Activity   Therapeutic Procedure/Exercise   Ther. Procedure: strength, endurance, ROM, flexibillity Minutes (11996) 15   Symptoms Noted During/After Treatment none   Treatment Detail/Skilled Intervention Facilitated therapeutic exercise to increase independence with transfers and ambulation.  Patient educated in and performed the following exercises x 3 reps with verbal and tactile cueing for correct technique: ankle DF/PF, quad/ham/glut sets, heel slides, SAQ, SLR, supine knee extension stretch. Handout provided.   Therapeutic Activity   Therapeutic Activities: dynamic activities to improve functional performance Minutes (07890) 16   Symptoms Noted During/After Treatment None   Treatment Detail/Skilled Intervention Patient supine upon arrival.  Patient performed bed mobility mod I. Able to demonstrate repeated SLR, no KI required. Patient transferred between sitting and standing with 2WW and CGA. When attempting to take steps, R  knee carisa. Pt able to return supine independently. Education provided for POC and possible reasons for foot numbness, pt urged to keep RN updated.   PT Discharge Planning   PT Plan review LE therex, trial ambulation, trial stairs as able   PT Discharge Recommendation (DC Rec) other (see comments)  (defer to ortho)   PT Rationale for DC Rec Pt currently below baseline and unable to ambulate this date d/t knee buckling. Anticipate with further IPPT pt will be able to demo safe household mobility.   PT Brief overview of current status Ax1 w/ FWW   PT Equipment Needed at Discharge   (platform attachment for walker)   Total Session Time   Timed Code Treatment Minutes 31   Total Session Time (sum of timed and untimed services) 41

## 2024-08-21 NOTE — ANESTHESIA CARE TRANSFER NOTE
Patient: Maeve Lovell    Procedure: Procedure(s):  Right total knee arthroplasty       Diagnosis: Primary osteoarthritis of right knee [M17.11]  Diagnosis Additional Information: No value filed.    Anesthesia Type:   General     Note:    Oropharynx: oropharynx clear of all foreign objects and spontaneously breathing  Level of Consciousness: awake  Oxygen Supplementation: face mask  Level of Supplemental Oxygen (L/min / FiO2): 6l  Independent Airway: airway patency satisfactory and stable  Dentition: dentition unchanged  Vital Signs Stable: post-procedure vital signs reviewed and stable  Report to RN Given: handoff report given  Patient transferred to: PACU  Comments: Pt to PACU, VSS, report to RN  Handoff Report: Identifed the Patient, Identified the Reponsible Provider, Reviewed the pertinent medical history, Discussed the surgical course, Reviewed Intra-OP anesthesia mangement and issues during anesthesia, Set expectations for post-procedure period and Allowed opportunity for questions and acknowledgement of understanding      Vitals:  Vitals Value Taken Time   /59 08/21/24 1350   Temp     Pulse 96 08/21/24 1352   Resp 25 08/21/24 1352   SpO2 99 % 08/21/24 1352   Vitals shown include unfiled device data.    Electronically Signed By: MERCEDEZ Gonzalez CRNA  August 21, 2024  1:53 PM

## 2024-08-21 NOTE — ANESTHESIA POSTPROCEDURE EVALUATION
Patient: Maeve Lovell    Procedure: Procedure(s):  Right total knee arthroplasty       Anesthesia Type:  General    Note:  Disposition: Inpatient   Postop Pain Control:    PONV: No   Neuro/Psych: Uneventful            Sign Out: Acceptable/Baseline neuro status   Airway/Respiratory: Uneventful            Sign Out: Acceptable/Baseline resp. status   CV/Hemodynamics: Uneventful            Sign Out: Acceptable CV status; No obvious hypovolemia; No obvious fluid overload   Other NRE:    DID A NON-ROUTINE EVENT OCCUR? No           Last vitals:  Vitals Value Taken Time   /74 08/21/24 1520   Temp 98.3  F (36.8  C) 08/21/24 1347   Pulse 77 08/21/24 1520   Resp 18 08/21/24 1500   SpO2 95 % 08/21/24 1525   Vitals shown include unfiled device data.    Electronically Signed By: Sarah Langley MD  August 21, 2024  3:38 PM

## 2024-08-21 NOTE — BRIEF OP NOTE
[facility] Brief Operative Note    Pre-operative diagnosis: Primary osteoarthritis of right knee [M17.11]   Post-operative diagnosis Right Knee Osteoarthritis   Procedure: Procedure(s):  Right total knee arthroplasty   Surgeon(s): Surgeons and Role:     * Keon Thomas MD - Primary     * Ti Pereira PA-C - Assisting   Estimated blood loss: 100 mL    Specimens: * No specimens in log *   Findings: As dictated       Keon Thomas MD

## 2024-08-21 NOTE — ANESTHESIA PROCEDURE NOTES
"Adductor canal Procedure Note    Pre-Procedure   Staff -        Anesthesiologist:  Ariana Stewart MD       Performed By: anesthesiologist       Location: pre-op       Procedure Start/Stop Times: 8/21/2024 10:22 AM and 8/21/2024 10:25 AM       Pre-Anesthestic Checklist: patient identified, IV checked, site marked, risks and benefits discussed, informed consent, monitors and equipment checked, pre-op evaluation, at physician/surgeon's request and post-op pain management  Timeout:       Correct Patient: Yes        Correct Procedure: Yes        Correct Site: Yes        Correct Position: Yes        Correct Laterality: Yes        Site Marked: Yes  Procedure Documentation  Procedure: Adductor canal       Laterality: right       Patient Position: sitting       Skin prep: Betadine       Needle Type: short bevel       Needle Gauge: 21.        Needle Length (Inches): 4        Ultrasound guided       1. Ultrasound was used to identify targeted nerve, plexus, vascular marker, or fascial plane and place a needle adjacent to it in real-time.       2. Ultrasound was used to visualize the spread of anesthetic in close proximity to the above referenced structure.       4. The visualized anatomic structures appeared normal.       5. There were no apparent abnormal pathologic findings.    Assessment/Narrative         The placement was negative for: blood aspirated, painful injection and site bleeding       Paresthesias: No.       Bolus given via needle..        Secured via.        Insertion/Infusion Method: Single Shot       Complications: none       Injection made incrementally with aspirations every 5 mL.    Medication(s) Administered   Bupivacaine 0.25% w/ 1:200K Epi (Injection) - Injection   20 mL - 8/21/2024 10:22:00 AM  Medication Administration Time: 8/21/2024 10:22 AM      FOR [facility] ([facility]) ONLY:   Pain Team Contact information: please page the Pain Team Via Plan[employer]. Search \"Pain\". During daytime hours, please page the " attending first. At night please page the resident first.

## 2024-08-21 NOTE — ANESTHESIA PREPROCEDURE EVALUATION
Anesthesia Pre-Procedure Evaluation    Patient: Maeve Lovell   MRN: [id number] : 1960        Procedure : Procedure(s):  Right total knee arthroplasty          Past Medical History:   Diagnosis Date    Lichen sclerosus of female genitalia     RA (rheumatoid arthritis) (H)     Ulcerative colitis (H)       Past Surgical History:   Procedure Laterality Date    COLON SURGERY  2022    With J-pouch creation    HEMORRHOIDECTOMY      ORTHOPEDIC SURGERY      Bunion surgery    SEPTOPLASTY        Allergies   Allergen Reactions    Heparin Hives    Iodinated Contrast Media Hives     angioneuritic edema of face and throat    Methylprednisolone Hives    Alcohol Hives    Egg White (Egg Protein) Nausea    Nickel     Wheat Nausea      Social History     Tobacco Use    Smoking status: Never    Smokeless tobacco: Never   Substance Use Topics    Alcohol use: Not Currently      Wt Readings from Last 1 Encounters:   24 84.6 kg (186 lb 8 oz)        Anesthesia Evaluation            ROS/MED HX  ENT/Pulmonary:  - neg pulmonary ROS     Neurologic:       Cardiovascular:       METS/Exercise Tolerance:     Hematologic:       Musculoskeletal:   (+)  arthritis (rheumatoid arthritis, ulcerative colitis, on chronic steroids),             GI/Hepatic:       Renal/Genitourinary:       Endo:     (+)               Obesity (BMI 36),       Psychiatric/Substance Use:       Infectious Disease:       Malignancy:       Other:            Physical Exam    Airway        Mallampati: II       Respiratory Devices and Support         Dental           Cardiovascular   cardiovascular exam normal          Pulmonary   pulmonary exam normal                OUTSIDE LABS:  CBC:   Lab Results   Component Value Date    WBC 9.0 2024    WBC 11.3 (H) 2024    HGB 12.1 2024    HGB 10.9 (L) 2024    HCT 40.3 2024    HCT 36.1 2024     2024     2024     BMP:   Lab Results   Component Value Date      "08/03/2022    POTASSIUM 3.6 08/03/2022    CHLORIDE 105 08/03/2022    CO2 25 08/03/2022    BUN 12 08/03/2022    CR 0.63 08/03/2022    CR 0.64 01/23/2006    GLC 90 08/03/2022     COAGS: No results found for: \"PTT\", \"INR\", \"FIBR\"  POC: No results found for: \"BGM\", \"HCG\", \"HCGS\"  HEPATIC:   Lab Results   Component Value Date    ALBUMIN 4.3 12/09/2005    ALT 18 01/23/2006    AST 19 01/23/2006     OTHER:   Lab Results   Component Value Date    EVA 9.4 08/03/2022    TSH 1.97 09/12/2005    CRP 1.10 (H) 09/12/2005    SED 34 (H) 10/27/2005       Anesthesia Plan    ASA Status:  2    NPO Status:  NPO Appropriate    Anesthesia Type: General.     - Airway: LMA   Induction: Intravenous.   Maintenance: Balanced.        Consents    Anesthesia Plan(s) and associated risks, benefits, and realistic alternatives discussed. Questions answered and patient/representative(s) expressed understanding.     - Discussed:     - Discussed with:  Patient            Postoperative Care    Pain management: IV analgesics, Peripheral nerve block (Single Shot), Multi-modal analgesia.   PONV prophylaxis: Ondansetron (or other 5HT-3), Dexamethasone or Solumedrol     Comments:               Ariana Stewart MD    I have reviewed the pertinent notes and labs in the chart from the past 30 days and (re)examined the patient.  Any updates or changes from those notes are reflected in this note.              # Obesity: Estimated body mass index is 36.42 kg/m  as calculated from the following:    Height as of 8/5/24: 1.524 m (5').    Weight as of this encounter: 84.6 kg (186 lb 8 oz).      "

## 2024-08-22 ENCOUNTER — TELEPHONE (OUTPATIENT)
Dept: ORTHOPEDICS | Facility: CLINIC | Age: 64
End: 2024-08-22

## 2024-08-22 ENCOUNTER — APPOINTMENT (OUTPATIENT)
Dept: PHYSICAL THERAPY | Facility: CLINIC | Age: 64
End: 2024-08-22
Attending: STUDENT IN AN ORGANIZED HEALTH CARE EDUCATION/TRAINING PROGRAM
Payer: MEDICARE

## 2024-08-22 ENCOUNTER — APPOINTMENT (OUTPATIENT)
Dept: OCCUPATIONAL THERAPY | Facility: CLINIC | Age: 64
End: 2024-08-22
Attending: PHYSICIAN ASSISTANT
Payer: MEDICARE

## 2024-08-22 VITALS
HEART RATE: 79 BPM | SYSTOLIC BLOOD PRESSURE: 119 MMHG | TEMPERATURE: 97.7 F | OXYGEN SATURATION: 92 % | DIASTOLIC BLOOD PRESSURE: 69 MMHG | WEIGHT: 186.5 LBS | BODY MASS INDEX: 36.42 KG/M2 | RESPIRATION RATE: 16 BRPM

## 2024-08-22 LAB
ANION GAP SERPL CALCULATED.3IONS-SCNC: 12 MMOL/L (ref 7–15)
BUN SERPL-MCNC: 10.6 MG/DL (ref 8–23)
CALCIUM SERPL-MCNC: 9 MG/DL (ref 8.8–10.4)
CHLORIDE SERPL-SCNC: 100 MMOL/L (ref 98–107)
CREAT SERPL-MCNC: 0.62 MG/DL (ref 0.51–0.95)
EGFRCR SERPLBLD CKD-EPI 2021: >90 ML/MIN/1.73M2
GLUCOSE SERPL-MCNC: 110 MG/DL (ref 70–99)
HCO3 SERPL-SCNC: 23 MMOL/L (ref 22–29)
HGB BLD-MCNC: 11 G/DL (ref 11.7–15.7)
POTASSIUM SERPL-SCNC: 3.6 MMOL/L (ref 3.4–5.3)
SODIUM SERPL-SCNC: 135 MMOL/L (ref 135–145)

## 2024-08-22 PROCEDURE — 97116 GAIT TRAINING THERAPY: CPT | Mod: GP | Performed by: PHYSICAL THERAPIST

## 2024-08-22 PROCEDURE — 250N000013 HC RX MED GY IP 250 OP 250 PS 637: Performed by: PHYSICIAN ASSISTANT

## 2024-08-22 PROCEDURE — 97165 OT EVAL LOW COMPLEX 30 MIN: CPT | Mod: GO | Performed by: REHABILITATION PRACTITIONER

## 2024-08-22 PROCEDURE — 97530 THERAPEUTIC ACTIVITIES: CPT | Mod: GP | Performed by: PHYSICAL THERAPIST

## 2024-08-22 PROCEDURE — 97110 THERAPEUTIC EXERCISES: CPT | Mod: GP | Performed by: PHYSICAL THERAPIST

## 2024-08-22 PROCEDURE — 250N000011 HC RX IP 250 OP 636: Performed by: PHYSICIAN ASSISTANT

## 2024-08-22 PROCEDURE — 85018 HEMOGLOBIN: CPT | Performed by: PHYSICIAN ASSISTANT

## 2024-08-22 PROCEDURE — 97535 SELF CARE MNGMENT TRAINING: CPT | Mod: GO | Performed by: REHABILITATION PRACTITIONER

## 2024-08-22 PROCEDURE — 80048 BASIC METABOLIC PNL TOTAL CA: CPT | Performed by: PHYSICIAN ASSISTANT

## 2024-08-22 PROCEDURE — 36415 COLL VENOUS BLD VENIPUNCTURE: CPT | Performed by: PHYSICIAN ASSISTANT

## 2024-08-22 RX ORDER — ONDANSETRON 4 MG/1
4 TABLET, FILM COATED ORAL EVERY 6 HOURS PRN
Qty: 24 TABLET | Refills: 0 | Status: SHIPPED | OUTPATIENT
Start: 2024-08-22

## 2024-08-22 RX ADMIN — HYDROXYZINE HYDROCHLORIDE 10 MG: 10 TABLET, FILM COATED ORAL at 07:15

## 2024-08-22 RX ADMIN — HYDROXYCHLOROQUINE SULFATE 400 MG: 200 TABLET ORAL at 09:15

## 2024-08-22 RX ADMIN — OXYCODONE HYDROCHLORIDE 5 MG: 5 TABLET ORAL at 11:10

## 2024-08-22 RX ADMIN — CEFAZOLIN SODIUM 2 G: 2 INJECTION, SOLUTION INTRAVENOUS at 01:26

## 2024-08-22 RX ADMIN — ACETAMINOPHEN 975 MG: 325 TABLET, FILM COATED ORAL at 07:15

## 2024-08-22 RX ADMIN — ASPIRIN 81 MG: 81 TABLET, COATED ORAL at 09:14

## 2024-08-22 RX ADMIN — OXYCODONE HYDROCHLORIDE 5 MG: 5 TABLET ORAL at 09:14

## 2024-08-22 RX ADMIN — SENNOSIDES AND DOCUSATE SODIUM 1 TABLET: 50; 8.6 TABLET ORAL at 09:15

## 2024-08-22 RX ADMIN — CALCIUM 500 MG: 500 TABLET ORAL at 09:15

## 2024-08-22 RX ADMIN — ACETAMINOPHEN 975 MG: 325 TABLET, FILM COATED ORAL at 01:25

## 2024-08-22 RX ADMIN — OXYCODONE HYDROCHLORIDE 5 MG: 5 TABLET ORAL at 04:30

## 2024-08-22 RX ADMIN — ONDANSETRON 4 MG: 4 TABLET, ORALLY DISINTEGRATING ORAL at 12:41

## 2024-08-22 ASSESSMENT — ACTIVITIES OF DAILY LIVING (ADL)
ADLS_ACUITY_SCORE: 27

## 2024-08-22 NOTE — DISCHARGE INSTRUCTIONS
While taking oxycodone (an opioid), do not take your scheduled loperamide as the oxycodone can cause constipation and you are being prescribed laxatives. Once no longer taking oxycodone, can resume home loperamide as usual.

## 2024-08-22 NOTE — TELEPHONE ENCOUNTER
Pt returned a missed call from this team, in regard to pt's Gabapentin.    Please CALL pt back, to discuss. Thank you.

## 2024-08-22 NOTE — PROGRESS NOTES
08/22/24 0931   Appointment Info   Signing Clinician's Name / Credentials (OT) Carmen Vasquez, OTR/L   Living Environment   People in Home alone   Current Living Arrangements house   Home Accessibility stairs within home   Number of Stairs, Within Home, Primary two   Transportation Anticipated family or friend will provide   Living Environment Comments Pt has 2 platform stairs, tub/shower, SHT   Self-Care   Usual Activity Tolerance good   Regular Exercise No   Equipment Currently Used at Home cane, straight;grab bar, toilet;walker, rolling  (hand held shower, borrowed a scooter)   Fall history within last six months no   Activity/Exercise/Self-Care Comment Patient has rheumatoid arthritis, concerned about ability to use a walker, may need platform walker.  Pt will not have someone available to help her 24/7, however has neighbors, friends that live nearby, and a family member to call if she needs assistance. Will have support for the first day back home.   General Information   Onset of Illness/Injury or Date of Surgery 08/21/24   Referring Physician Ti Pereira, RICARDO   Patient/Family Therapy Goal Statement (OT) to return home   Additional Occupational Profile Info/Pertinent History of Current Problem patient is POD #1 for R TKA   Existing Precautions/Restrictions fall   Right Lower Extremity (Weight-bearing Status) weight-bearing as tolerated (WBAT)   General Observations and Info pt was in bed in agreeable to OT session   Cognitive Status Examination   Orientation Status orientation to person, place and time   Visual Perception   Visual Impairment/Limitations WFL   Pain Assessment   Patient Currently in Pain Yes, see Vital Sign flowsheet  (rating not provided)   Posture   Posture not impaired   Range of Motion Comprehensive   Comment, General Range of Motion has RA, will use B platforms on fww   Strength Comprehensive (MMT)   Comment, General Manual Muscle Testing (MMT) Assessment decreased strength in R  LE to A as needed   Muscle Tone Assessment   Muscle Tone Quick Adds No deficits were identified   Coordination   Upper Extremity Coordination No deficits were identified   Bed Mobility   Bed Mobility No deficits identified   Comment (Bed Mobility) completed with SBA   Transfers   Transfers toilet transfer;shower transfer;sit-stand transfer   Sit-Stand Transfer   Sit-Stand [place] (Transfers) contact guard;verbal cues   Assistive Device (Sit-Stand Transfers) walker, rolling platform   Shower Transfer   [place] Level (Shower Transfer) minimum assist (75% patient effort);verbal cues   Assistive Device (Shower Transfer) shower chair;walker, rolling platform   Shower Transfer Comments tub transfer   Toilet Transfer   Type (Toilet Transfer) stand-sit   [place] Level (Toilet Transfer) contact guard   Assistive Device (Toilet Transfer) walker, rolling platform   Balance   Balance Comments LOB was not noted, general unsteadiness to be expected   Activities of Daily Living   BADL Assessment/Intervention lower body dressing   Lower Body Dressing Assessment/Training   [place] Level (Lower Body Dressing) contact guard assist;verbal cues   Clinical Impression   Criteria for Skilled Therapeutic Interventions Met (OT) Yes, treatment indicated   OT Diagnosis decreased ADL/IADls   OT Problem List-Impairments impacting ADL activity tolerance impaired;balance;strength;post-surgical precautions;range of motion (ROM)   Assessment of Occupational Performance 5 or more Performance Deficits   Identified Performance Deficits Dsg, toileting, bathing, functional/community mobility, household chores, driving, errands   Planned Therapy Interventions (OT) ADL retraining;progressive activity/exercise;transfer training   Clinical Decision Making Complexity (OT) problem focused assessment/low complexity   Risk & Benefits of therapy have been explained evaluation/treatment results reviewed;care plan/treatment goals  reviewed;risks/benefits reviewed;current/potential barriers reviewed;patient   OT Total Evaluation Time   OT Eval, Low Complexity Minutes (99605) 8   OT Goals   Therapy Frequency (OT) One time eval and treatment   OT Predicted Duration/Target Date for Goal Attainment 08/22/24   OT Goals Lower Body Dressing;Transfers;Toilet Transfer/Toileting;OT Goal 1;OT Goal 2   OT: Lower Body Dressing Modified independent;Goal Met   OT: Transfer Modified independent;with assistive device;Goal Met  (tub transfer)   OT: Toilet Transfer/Toileting Modified independent;toilet transfer;Goal Met;using adaptive equipment   OT: Goal 1 Patient will verbalize at least 2-3 adaptations to ADLs routines at home to accommodate energy level and safety needs.- goal met   OT: Goal 2 Patient will demonstrate safe use of walker during ADLs.- goal met   OT Discharge Planning   OT Plan dc   OT Rationale for DC Rec defer to ortho team for dc plan- patient has met needed goals for safe discharge home with family to A as needed with IADL's; household chores, driving, errands, cooking and bathing. Recommended AE; reacher and LHS, patient has all other needed AE. Will discharge from OT services.   OT Brief overview of current status SBA, platform fww for mobility, transfers, mod I for ADLs   Total Session Time   Total Session Time (sum of timed and untimed services) 8

## 2024-08-22 NOTE — PLAN OF CARE
Physical Therapy Discharge Summary    Reason for therapy discharge:    Discharged to home with outpatient therapy.    Progress towards therapy goal(s). [facility] for goal details.  Goals partially met.  Barriers to achieving goals:   discharge from facility.    Therapy recommendation(s):    Continued therapy is recommended.  Rationale/Recommendations:  OP PT and assist from neighbor/friends .

## 2024-08-22 NOTE — PLAN OF CARE
Goal Outcome Evaluation:    A&Ox4.  On 2L/NC, sats mid 90's.  Reports right foot numbness, starting to resolve.  Otherwise CMS intact.  Right knee dressing with ace wrap C/D/I.   Declined pain meds earlier, now requesting.  Voiding, up to BSC with assist 1/gait belt/ pivot transfer.  No BM.  Declined dinner, expressed concern of urgency of stools after eating. Has PRN Imodium order, takes at home.  Plan PT in morning, home if medically ready      Plan of Care Reviewed With: patient    Overall Patient Progress: improvingOverall Patient Progress: improving

## 2024-08-22 NOTE — PROGRESS NOTES
Occupational Therapy Discharge Summary    Reason for therapy discharge:    All goals and outcomes met, no further needs identified.    Progress towards therapy goal(s). See goals on Care Plan in [facility] electronic health record for goal details.  Goals met    Therapy recommendation(s):    No further therapy is recommended.

## 2024-08-22 NOTE — DISCHARGE SUMMARY
[facility]  Discharge Summary            Interval History:     64 year old female admitted postoperatively for elective Right total knee arthroplasty  with Dr. Keon Thomas due to DJD unresponsive to non operative treatment.  There were no notable intraoperative complications.  Patient being discharged post op Day #1.  Maeve Lovell received skilled nursing, PT, OT, SW inquiry.  There was no Hospitalist care during the stay.     Maeve Lovell has progressed satisfactorily with ambulation and pain management and without medical complication.  Patient is confident in their discharge and has  met goals with PT and OT. Pt lives at home and will be discharged to home based on home living conditions and level of support.  Maeve Lovell leaves the hospital in stable condition with good chance for recovery.  Today: doing well overall.  Eating, ambulating, voiding, understands cares.  No complaints.    Pain: tolerable.   Nausea: none.  Light headedness : none  Chest pain: none  Shortness of breath: none              Assessment and Plan:     S/P R TKA Day #1.  Final Diagnosis: same.  VSS, Hemodynamically asymptomatic, pain management adequate.    PLAN:  DVT prophylaxis with daily aspirin, as patient has no history of VTE.  Pain management with tylenol, oxycodone, gabapentin.  Zofran.  Bowel Regimen.  RICE  Assistive devices as determined by Physical therapy.  OP PT.  Patient instructions attached to discharge.      Discharge to home.  Follow up in clinic as previously scheduled, approx 10-14 days post op.    [facility]  [address] [place]  [address]  [phone]  [phone] fax  [phone] for scheduling                      Physical Exam:   Patient Vitals for the past 12 hrs:   BP Temp Temp src Pulse Resp SpO2   08/22/24 0913 119/69 97.7  F (36.5  C) Temporal 79 16 92 %   08/22/24 0700 130/73 97.3  F (36.3  C) Temporal 90 14 93 %   08/22/24 0018 (!) 142/73 96.9  F  (36.1  C) Temporal 56 -- 94 %     Hemoglobin   Date Value Ref Range Status   08/22/2024 11.0 (L) 11.7 - 15.7 g/dL Final   07/29/2024 12.1 11.7 - 15.7 g/dL Final   01/23/2006 12.9 11.7 - 15.7 g/dL Final   12/09/2005 12.1 11.7 - 15.7 g/dL Final       Patient is alert and oriented.  Vitals: stable  Neurovascular status: Grossly intact to RLE, SLR able.   Dressing: clean and dry  Calves: soft and non tender          Ti Pereira PA-C  [facility] - Surgery    8/22/2024

## 2024-08-22 NOTE — PROGRESS NOTES
Patient vital signs are at baseline: Yes  Patient able to ambulate as they were prior to admission or with assist devices provided by therapies during their stay:  Yes  Patient MUST void prior to discharge:  Yes  Patient able to tolerate oral intake:  Yes  Pain has adequate pain control using Oral analgesics:  Yes  Does patient have an identified :  Yes. Friend here.  Has goal D/C date and time been discussed with patient:  Yes   Rewrapped ace wrap. Dressing clean and dry. Oriented x4. Passed therapies. Home now with friend. Moving well with walker. CMS intact. Verbalized understanding to all discharge instructions. No edema. Outpatient PT set up.

## 2024-08-22 NOTE — PLAN OF CARE
"Goal Outcome Evaluation:      Plan of Care Reviewed With: patient    Overall Patient Progress: improvingOverall Patient Progress: improving    Outcome Evaluation: pt is alert and oriented x4, able to make needs known, assist of 1 with a walker to bedside commode . oxycodone 5 mg , tylenol given.      Problem: Adult Inpatient Plan of Care  Goal: Plan of Care Review  Description: The Plan of Care Review/Shift note should be completed every shift.  The Outcome Evaluation is a brief statement about your assessment that the patient is improving, declining, or no change.  This information will be displayed automatically on your shift  note.  Outcome: Progressing  Flowsheets (Taken 8/22/2024 0512)  Outcome Evaluation: pt is alert and oriented x4, able to make needs known, assist of 1 with a walker to bedside commode . oxycodone 5 mg , tylenol given.  Plan of Care Reviewed With: patient  Overall Patient Progress: improving  Goal: Patient-Specific Goal (Individualized)  Description: You can add care plan individualizations to a care plan. Examples of Individualization might be:  \"Parent requests to be called daily at 9am for status\", \"I have a hard time hearing out of my right ear\", or \"Do not touch me to wake me up as it startles  me\".  Outcome: Progressing  Goal: Absence of Hospital-Acquired Illness or Injury  Outcome: Progressing  Intervention: Identify and Manage Fall Risk  Recent Flowsheet Documentation  Taken 8/21/2024 2030 by Ness Lu, RN  Safety Promotion/Fall Prevention:   activity supervised   nonskid shoes/slippers when out of bed   patient and family education   safety round/check completed  Intervention: Prevent Skin Injury  Recent Flowsheet Documentation  Taken 8/21/2024 2030 by Ness Lu, RN  Body Position: supine, head elevated  Skin Protection: adhesive use limited  Device Skin Pressure Protection:   adhesive use limited   tubing/devices free from skin contact  Intervention: " Prevent and Manage VTE (Venous Thromboembolism) Risk  Recent Flowsheet Documentation  Taken 8/21/2024 2030 by Ness Lu RN  VTE Prevention/Management: SCDs on (sequential compression devices)  Intervention: Prevent Infection  Recent Flowsheet Documentation  Taken 8/21/2024 2030 by Ness Lu, RN  Infection Prevention: single patient room provided  Goal: Optimal Comfort and Wellbeing  Outcome: Progressing  Goal: Readiness for Transition of Care  Outcome: Progressing     Problem: Knee Arthroplasty  Goal: Optimal Coping  Outcome: Progressing  Goal: Absence of Bleeding  Outcome: Progressing  Goal: Effective Bowel Elimination  Outcome: Progressing  Goal: Fluid and Electrolyte Balance  Outcome: Progressing  Goal: Optimal Functional Ability  Outcome: Progressing  Intervention: Promote Optimal Functional Status  Recent Flowsheet Documentation  Taken 8/21/2024 2309 by Ness Lu RN  Assistive Device Utilized:   gait belt   walker  Activity Management:   up to bedside commode   ambulated to bathroom  Taken 8/21/2024 2030 by Ness Lu RN  Assistive Device Utilized:   gait belt   walker  Taken 8/21/2024 2002 by Ness Lu RN  Assistive Device Utilized:   gait belt   walker  Activity Management:   up to bedside commode   back to bed  Goal: Absence of Infection Signs and Symptoms  Outcome: Progressing  Goal: Intact Neurovascular Status  Outcome: Progressing  Goal: Anesthesia/Sedation Recovery  Outcome: Progressing  Intervention: Optimize Anesthesia Recovery  Recent Flowsheet Documentation  Taken 8/21/2024 2030 by Ness Lu, RN  Safety Promotion/Fall Prevention:   activity supervised   nonskid shoes/slippers when out of bed   patient and family education   safety round/check completed  Goal: Optimal Pain Control and Function  Outcome: Progressing  Goal: Nausea and Vomiting Relief  Outcome: Progressing  Goal: Effective Urinary Elimination  Outcome:  Progressing  Goal: Effective Oxygenation and Ventilation  Outcome: Progressing  Intervention: Optimize Oxygenation and Ventilation  Recent Flowsheet Documentation  Taken 8/21/2024 2030 by Ness Lu, RN  Head of Bed (HOB) Positioning: HOB at 30 degrees

## 2024-08-23 DIAGNOSIS — G89.18 ACUTE POST-OPERATIVE PAIN: ICD-10-CM

## 2024-08-23 RX ORDER — OXYCODONE HYDROCHLORIDE 5 MG/1
5-10 TABLET ORAL EVERY 4 HOURS PRN
Qty: 26 TABLET | Refills: 0 | Status: SHIPPED | OUTPATIENT
Start: 2024-08-23 | End: 2024-09-09

## 2024-08-23 NOTE — TELEPHONE ENCOUNTER
No action needed. Faustino Pereira spoke with patient on phone.     Tamra Silverio MSA, ATC  Certified Athletic Trainer

## 2024-08-28 ENCOUNTER — THERAPY VISIT (OUTPATIENT)
Dept: PHYSICAL THERAPY | Facility: CLINIC | Age: 64
End: 2024-08-28
Attending: STUDENT IN AN ORGANIZED HEALTH CARE EDUCATION/TRAINING PROGRAM
Payer: MEDICARE

## 2024-08-28 DIAGNOSIS — Z96.651 AFTERCARE FOLLOWING RIGHT KNEE JOINT REPLACEMENT SURGERY: Primary | ICD-10-CM

## 2024-08-28 DIAGNOSIS — Z47.1 AFTERCARE FOLLOWING RIGHT KNEE JOINT REPLACEMENT SURGERY: Primary | ICD-10-CM

## 2024-08-28 DIAGNOSIS — M17.11 PRIMARY OSTEOARTHRITIS OF RIGHT KNEE: ICD-10-CM

## 2024-08-28 PROCEDURE — 97161 PT EVAL LOW COMPLEX 20 MIN: CPT | Mod: GP | Performed by: PHYSICAL THERAPIST

## 2024-08-28 PROCEDURE — 97110 THERAPEUTIC EXERCISES: CPT | Mod: GP | Performed by: PHYSICAL THERAPIST

## 2024-08-28 PROCEDURE — 97530 THERAPEUTIC ACTIVITIES: CPT | Mod: GP | Performed by: PHYSICAL THERAPIST

## 2024-08-28 ASSESSMENT — ACTIVITIES OF DAILY LIVING (ADL)
SWELLING: THE SYMPTOM AFFECTS MY ACTIVITY MODERATELY
LIMPING: THE SYMPTOM AFFECTS MY ACTIVITY MODERATELY
SQUAT: I AM UNABLE TO DO THE ACTIVITY
WALK: ACTIVITY IS VERY DIFFICULT
GIVING WAY, BUCKLING OR SHIFTING OF KNEE: THE SYMPTOM AFFECTS MY ACTIVITY SLIGHTLY
KNEE_ACTIVITY_OF_DAILY_LIVING_SCORE: 32.86
STAND: ACTIVITY IS FAIRLY DIFFICULT
GIVING WAY, BUCKLING OR SHIFTING OF KNEE: THE SYMPTOM AFFECTS MY ACTIVITY SLIGHTLY
RAW_SCORE: 23
WEAKNESS: THE SYMPTOM AFFECTS MY ACTIVITY MODERATELY
GO UP STAIRS: ACTIVITY IS FAIRLY DIFFICULT
STIFFNESS: THE SYMPTOM AFFECTS MY ACTIVITY MODERATELY
STIFFNESS: THE SYMPTOM AFFECTS MY ACTIVITY MODERATELY
KNEEL ON THE FRONT OF YOUR KNEE: I AM UNABLE TO DO THE ACTIVITY
KNEEL ON THE FRONT OF YOUR KNEE: I AM UNABLE TO DO THE ACTIVITY
PAIN: THE SYMPTOM AFFECTS MY ACTIVITY MODERATELY
SWELLING: THE SYMPTOM AFFECTS MY ACTIVITY MODERATELY
SIT WITH YOUR KNEE BENT: ACTIVITY IS VERY DIFFICULT
PAIN: THE SYMPTOM AFFECTS MY ACTIVITY MODERATELY
HOW_WOULD_YOU_RATE_THE_CURRENT_FUNCTION_OF_YOUR_KNEE_DURING_YOUR_USUAL_DAILY_ACTIVITIES_ON_A_SCALE_FROM_0_TO_100_WITH_100_BEING_YOUR_LEVEL_OF_KNEE_FUNCTION_PRIOR_TO_YOUR_INJURY_AND_0_BEING_THE_INABILITY_TO_PERFORM_ANY_OF_YOUR_USUAL_DAILY_ACTIVITIES?: 20
RISE FROM A CHAIR: ACTIVITY IS FAIRLY DIFFICULT
RISE FROM A CHAIR: ACTIVITY IS FAIRLY DIFFICULT
GO DOWN STAIRS: ACTIVITY IS FAIRLY DIFFICULT
HOW_WOULD_YOU_RATE_THE_OVERALL_FUNCTION_OF_YOUR_KNEE_DURING_YOUR_USUAL_DAILY_ACTIVITIES?: SEVERELY ABNORMAL
KNEE_ACTIVITY_OF_DAILY_LIVING_SUM: 23
HOW_WOULD_YOU_RATE_THE_OVERALL_FUNCTION_OF_YOUR_KNEE_DURING_YOUR_USUAL_DAILY_ACTIVITIES?: SEVERELY ABNORMAL
AS_A_RESULT_OF_YOUR_KNEE_INJURY,_HOW_WOULD_YOU_RATE_YOUR_CURRENT_LEVEL_OF_DAILY_ACTIVITY?: SEVERELY ABNORMAL
LIMPING: THE SYMPTOM AFFECTS MY ACTIVITY MODERATELY
GO UP STAIRS: ACTIVITY IS FAIRLY DIFFICULT
PLEASE_INDICATE_YOR_PRIMARY_REASON_FOR_REFERRAL_TO_THERAPY:: KNEE
WEAKNESS: THE SYMPTOM AFFECTS MY ACTIVITY MODERATELY
GO DOWN STAIRS: ACTIVITY IS FAIRLY DIFFICULT
STAND: ACTIVITY IS FAIRLY DIFFICULT
SIT WITH YOUR KNEE BENT: ACTIVITY IS VERY DIFFICULT
AS_A_RESULT_OF_YOUR_KNEE_INJURY,_HOW_WOULD_YOU_RATE_YOUR_CURRENT_LEVEL_OF_DAILY_ACTIVITY?: SEVERELY ABNORMAL
HOW_WOULD_YOU_RATE_THE_CURRENT_FUNCTION_OF_YOUR_KNEE_DURING_YOUR_USUAL_DAILY_ACTIVITIES_ON_A_SCALE_FROM_0_TO_100_WITH_100_BEING_YOUR_LEVEL_OF_KNEE_FUNCTION_PRIOR_TO_YOUR_INJURY_AND_0_BEING_THE_INABILITY_TO_PERFORM_ANY_OF_YOUR_USUAL_DAILY_ACTIVITIES?: 20
WALK: ACTIVITY IS VERY DIFFICULT
SQUAT: I AM UNABLE TO DO THE ACTIVITY

## 2024-08-29 NOTE — PROGRESS NOTES
"PHYSICAL THERAPY EVALUATION  Type of Visit: Evaluation       Fall Risk Screen:  Fall screen completed by: PT  Have you fallen 2 or more times in the past year?: No  Have you fallen and had an injury in the past year?: No  Is patient a fall risk?: No    Subjective   Physical Therapy Initial Evaluation: Subjective History  Date of Surgery: 8/21/2024.    Surgical Procedure/Limb: Right TKA  Surgeon Name: Dr. Thomas  Precautions/Restrictions: WBAT, ROMAT    Average Daily Pain Levels: 4-5/10 (Location: global knee; Quality: Aching/Throbbing)  Other Symptoms: Denies N/T, reports buckling giving way  Symptom Mgmt Strategies: Ice, meds, activity modification    Prior orthopaedic history/procedures: See Epic Chart  Prior non-operative management: See Epic Chart  Next MD Appt Date: 2 weeks post-op    Functional limitations following procedure: Gait, stairs, driving  Previous level of function: Independent but painful.     Patient Employment: None  Desired Physical Activity (Goals): \"return to everything\"           Presenting condition or subjective complaint: right knee replacement  Date of onset: 08/21/24    Relevant medical history:     Dates & types of surgery: knee replacement    Prior diagnostic imaging/testing results: MRI; CT scan; X-ray     Prior therapy history for the same diagnosis, illness or injury: No      Living Environment  Social support: Alone   Type of home: House   Stairs to enter the home: Yes 3 Is there a railing: Yes     Ramp: No   Stairs inside the home: Yes 12 Is there a railing: Yes     Help at home: None; Home and Yard maintenance tasks  Equipment owned: Four-point cane; Walker with wheels; Bath bench     Employment: No    Hobbies/Interests: gardening    Patient goals for therapy: everyrhing       Objective   Post-Operative Physical Therapy Examination    Physical Mobility Status  Gait: arrived with 2 wheeled walker with 2 platform attachments. (Patient using due to RA and being unable to take her " meds)   Transfers:  Independent, significant pain with transferring leg on/off plinth.     Anthropometric Measures     Right Left Difference   Joint ROM      Hyperextension 0 deg 0 deg 0 deg   Extension 4 deg 0 deg 4 deg   Flexion 67 deg 125 deg 57 deg   Circumferential Measures      Joint Line 46 cm 44 cm 2 cm   Effusion 2 0        Quadriceps Muscle Activation Right Left   Isometric Quad Activation Fair, Depressed intensity, and Co-contraction noted with Glutes Good   Straight Leg Raising Extensor lag of 14 deg No extensor lag     Status of Incision: Clean & healing      Assessment & Plan   CLINICAL IMPRESSIONS  Medical Diagnosis: S/P TKA    Treatment Diagnosis: Knee pain, knee swelling, knee weakness   Impression/Assessment: Patient is a 64 year old female with right knee complaints.  The following significant findings have been identified: Pain, Decreased ROM/flexibility, Decreased joint mobility, Decreased strength, Impaired balance, Decreased proprioception, Edema, Impaired gait, and Impaired muscle performance. These impairments interfere with their ability to perform self care tasks, recreational activities, household chores, driving , household mobility, and community mobility as compared to previous level of function.     Clinical Decision Making (Complexity):  Clinical Presentation: Stable/Uncomplicated  Clinical Presentation Rationale: based on medical and personal factors listed in PT evaluation  Clinical Decision Making (Complexity): Low complexity    PLAN OF CARE  Treatment Interventions:  Interventions: Gait Training, Manual Therapy, Neuromuscular Re-education, Therapeutic Activity, Therapeutic Exercise    Long Term Goals     PT Goal 1  Goal Identifier: Stairs  Goal Description: Patient will ascend and descend stairs with railing, without knee pain  Rationale: to maximize safety and independence within the home  Target Date: 11/21/24  PT Goal 2  Goal Identifier: Ambulation  Goal Description: Patient  will ambulate 20 minutes with normal gait mechanics without knee pain  Rationale: to maximize safety and independence within the community  Target Date: 10/24/24      Frequency of Treatment: 2x/week  Duration of Treatment: 4 weeks, then 1x/week for 8 weeks    Recommended Referrals to Other Professionals: Physical Therapy  Education Assessment:   Learner/Method: Patient;No Barriers to Learning    Risks and benefits of evaluation/treatment have been explained.   Patient/Family/caregiver agrees with Plan of Care.     Evaluation Time:     PT Eval, Low Complexity Minutes (03350): 15     Signing Clinician: BRADFORD Weber [facility]                                                                                   OUTPATIENT PHYSICAL THERAPY      PLAN OF TREATMENT FOR OUTPATIENT REHABILITATION   Patient's Last Name, First Name, Maeve Velasquez YOB: 1960   Provider's Name   [facility]   Medical Record No.  [id number]     Onset Date: 08/21/24  Start of Care Date: 08/28/24     Medical Diagnosis:  S/P TKA      PT Treatment Diagnosis:  Knee pain, knee swelling, knee weakness Plan of Treatment  Frequency/Duration: 2x/week/ 4 weeks, then 1x/week for 8 weeks    Certification date from 08/28/24 to 11/21/24         See note for plan of treatment details and functional goals     Fannie Stewart, PT                         I CERTIFY THE NEED FOR THESE SERVICES FURNISHED UNDER        THIS PLAN OF TREATMENT AND WHILE UNDER MY CARE     (Physician attestation of this document indicates review and certification of the therapy plan).              Referring Provider:  Keon Thomas    Initial Assessment  See Epic Evaluation- Start of Care Date: 08/28/24

## 2024-08-30 ENCOUNTER — THERAPY VISIT (OUTPATIENT)
Dept: PHYSICAL THERAPY | Facility: CLINIC | Age: 64
End: 2024-08-30
Attending: STUDENT IN AN ORGANIZED HEALTH CARE EDUCATION/TRAINING PROGRAM
Payer: MEDICARE

## 2024-08-30 ENCOUNTER — DOCUMENTATION ONLY (OUTPATIENT)
Dept: OTHER | Facility: CLINIC | Age: 64
End: 2024-08-30

## 2024-08-30 ENCOUNTER — TELEPHONE (OUTPATIENT)
Dept: ORTHOPEDICS | Facility: CLINIC | Age: 64
End: 2024-08-30

## 2024-08-30 DIAGNOSIS — M17.11 PRIMARY OSTEOARTHRITIS OF RIGHT KNEE: Primary | ICD-10-CM

## 2024-08-30 DIAGNOSIS — Z47.1 AFTERCARE FOLLOWING RIGHT KNEE JOINT REPLACEMENT SURGERY: ICD-10-CM

## 2024-08-30 DIAGNOSIS — Z96.651 AFTERCARE FOLLOWING RIGHT KNEE JOINT REPLACEMENT SURGERY: ICD-10-CM

## 2024-08-30 PROCEDURE — 97140 MANUAL THERAPY 1/> REGIONS: CPT | Mod: GP | Performed by: PHYSICAL THERAPIST

## 2024-08-30 PROCEDURE — 97110 THERAPEUTIC EXERCISES: CPT | Mod: GP | Performed by: PHYSICAL THERAPIST

## 2024-08-30 NOTE — TELEPHONE ENCOUNTER
Other: DOS 08/21, pt called, bandages is coming apart. Can temp fix but it will wait another week. Please call back and advise. POP f/up 09/09      Could we send this information to you in [employer] or would you prefer to receive a phone call?:   Patient would prefer a phone call   Okay to leave a detailed message?: Yes at Home number on file [phone] (home)

## 2024-08-30 NOTE — TELEPHONE ENCOUNTER
Talked with patient after rounding with provider. Per standing protocol and Provider patient is okay to remove bandage 1 week after surgery, Patient understood. Writer explained that they can wrap it up with an AC wrap for comfort but it is not needed. Patient understood and will call back with any concerns or questions.    Dimitris Kay ATC

## 2024-09-04 ENCOUNTER — THERAPY VISIT (OUTPATIENT)
Dept: PHYSICAL THERAPY | Facility: CLINIC | Age: 64
End: 2024-09-04
Attending: STUDENT IN AN ORGANIZED HEALTH CARE EDUCATION/TRAINING PROGRAM
Payer: MEDICARE

## 2024-09-04 DIAGNOSIS — Z47.1 AFTERCARE FOLLOWING RIGHT KNEE JOINT REPLACEMENT SURGERY: ICD-10-CM

## 2024-09-04 DIAGNOSIS — Z96.651 AFTERCARE FOLLOWING RIGHT KNEE JOINT REPLACEMENT SURGERY: ICD-10-CM

## 2024-09-04 DIAGNOSIS — M17.11 PRIMARY OSTEOARTHRITIS OF RIGHT KNEE: Primary | ICD-10-CM

## 2024-09-04 PROCEDURE — 97140 MANUAL THERAPY 1/> REGIONS: CPT | Mod: GP | Performed by: PHYSICAL THERAPIST

## 2024-09-04 PROCEDURE — 97110 THERAPEUTIC EXERCISES: CPT | Mod: GP | Performed by: PHYSICAL THERAPIST

## 2024-09-09 ENCOUNTER — OFFICE VISIT (OUTPATIENT)
Dept: ORTHOPEDICS | Facility: CLINIC | Age: 64
End: 2024-09-09
Payer: MEDICARE

## 2024-09-09 VITALS
DIASTOLIC BLOOD PRESSURE: 94 MMHG | WEIGHT: 186 LBS | SYSTOLIC BLOOD PRESSURE: 163 MMHG | BODY MASS INDEX: 36.52 KG/M2 | HEIGHT: 60 IN

## 2024-09-09 DIAGNOSIS — G89.18 ACUTE POST-OPERATIVE PAIN: ICD-10-CM

## 2024-09-09 PROCEDURE — 99024 POSTOP FOLLOW-UP VISIT: CPT | Performed by: PHYSICIAN ASSISTANT

## 2024-09-09 RX ORDER — OXYCODONE HYDROCHLORIDE 5 MG/1
5 TABLET ORAL EVERY 6 HOURS PRN
Qty: 15 TABLET | Refills: 0 | Status: SHIPPED | OUTPATIENT
Start: 2024-09-09

## 2024-09-09 ASSESSMENT — KOOS JR
STRAIGHTENING KNEE FULLY: MODERATE
GOING UP OR DOWN STAIRS: MILD
KOOS JR SCORING: 59.38
STANDING UPRIGHT: MODERATE
RISING FROM SITTING: MILD
TWISING OR PIVOTING ON KNEE: MODERATE
HOW SEVERE IS YOUR KNEE STIFFNESS AFTER FIRST WAKING IN MORNING: MODERATE
BENDING TO THE FLOOR TO PICK UP OBJECT: MILD

## 2024-09-09 NOTE — LETTER
9/9/2024      Maeve Lovell  [address]  [place] [address]      Dear Colleague,    Thank you for referring your patient, Maeve Lovell, to the [facility] [place]. Please see a copy of my visit note below.    HISTORY OF PRESENT ILLNESS:    Maeve Lovell is a 64 year old female who is seen in follow up for 8/21/24 TKA with dr lara. Has been to PT 3 times so far.     Present symptoms: Pt reports having pain after PT as well as later in the day.  4/10 currently.Treatments include tylenol, ice after PT.  Using walker for ambulation.  Tylenol throughout the day. Has only a few of the oxy. Takes this later in the afternoon around the time she does her PT excersices. . Asprin for DVT prophylaxis.  No new complaints.  Denies Chest pain, Calve pain, Fever, Chills.    PHYSICAL EXAM:  BP (!) 163/94   Ht 1.524 m (5')   Wt 84.4 kg (186 lb)   BMI 36.33 kg/m    Body mass index is 36.33 kg/m .   GENERAL APPEARANCE: healthy, alert, and no distress   PSYCH:  mentation appears normal and affect normal/bright    MSK:  Right:  Knee.  Ambulates: Well with walker.  Incision clean and dry, suture tails under aquacell present, healing.  Appropriate incisional erythema.   Yes Ecchymosis at lower leg.  No calve pain on palpation.  Edema moderate at knee min to ankle.  CMS: edvin incisional numbness, otherwise grossly intact.  AROM Flexion 0-80.  SLR: Able with SLR knee extension.    IMAGING INTERPRETATION:  None today.     ASSESSMENT:  Maeve Lovell is a 64 year old female S/P Right TKA.    Healing, progressing.    PLAN:  - Surgery discussed, images reviewed if applicable, and all questions were answered at this time.  - Suture tails removed with sterile technique, steri-strips applied in usual fashion.  - care instructions given and verbally acknowledged.  - Medications: may resume RA meds.  - Physical Therapy: per therapy.  - AAT.    Return to clinic 4 weeks for x ray.    Ti  RICARDO Pereira    Dept. Orthopedic Surgery  [facility]   9/9/2024         Again, thank you for allowing me to participate in the care of your patient.        Sincerely,        Ti Pereira PA-C

## 2024-09-09 NOTE — PATIENT INSTRUCTIONS
Incision Care:  Showering is okay at this time, however no soaking, submerging or scrubbing of incision until all scabs have resolved.  Any applied Steri-strips will most likely fall off on their own, however they may be removed after 1 weeks with rubbing alcohol if they have not.    If there is no draining or bleeding, the tape-strips or clean garments are enough coverage unless you were instructed otherwise or you would like to cover for comfort.  If drainage or bleeding occurs, please cover the incision with clean dressings.  If drainage or bleeding is significant or does not stop within 48 hours please notify the office.    Gradually increase your activities as you can tolerated them, starting at a level well below what you would normally do.     Scar tissue may develop and be present at or deep to the incision site for several weeks to months which may feel like a lump. Gentle massage to the area when tolerated may help reduce this.   Also the top layers of skin may peel away over the next weeks.      It is recommended that you delay any invasive procedures such as dental work, colonoscopy or other surgeries for 6 months after surgery unless it is an emergency.  Please notify the provider if an emergency occurs.    It is also recommended that all total joint patients should take antibiotics prior to dental visits for 1 year following surgery.  After the first year antibiotics are recommended for all high risk patients such as but not limited to:  previous joint infection, diabetes, immunosuppressive medications or being immunocompromised, multiple surgeries on your total joint, smoking, prior radiation to the joint area.    You may discontinue the aspirin or return to a dose recommended by your Primary care provider 4 weeks after surgery.    You may increase your activities as you can tolerate them.  It is recommended that you do not do prolonged kneeling.    It is also recommended that you continue your  exercises on your own for several additional months to avoid regressing.    Please follow up in 4-6 weeks as previously scheduled for an X ray of your knee.

## 2024-09-09 NOTE — PROGRESS NOTES
HISTORY OF PRESENT ILLNESS:    Maeve Lovell is a 64 year old female who is seen in follow up for 8/21/24 TKA with dr lara. Has been to PT 3 times so far.     Present symptoms: Pt reports having pain after PT as well as later in the day.  4/10 currently.Treatments include tylenol, ice after PT.  Using walker for ambulation.  Tylenol throughout the day. Has only a few of the oxy. Takes this later in the afternoon around the time she does her PT excersices. . Asprin for DVT prophylaxis.  No new complaints.  Denies Chest pain, Calve pain, Fever, Chills.    PHYSICAL EXAM:  BP (!) 163/94   Ht 1.524 m (5')   Wt 84.4 kg (186 lb)   BMI 36.33 kg/m    Body mass index is 36.33 kg/m .   GENERAL APPEARANCE: healthy, alert, and no distress   PSYCH:  mentation appears normal and affect normal/bright    MSK:  Right:  Knee.  Ambulates: Well with walker.  Incision clean and dry, suture tails under aquacell present, healing.  Appropriate incisional erythema.   Yes Ecchymosis at lower leg.  No calve pain on palpation.  Edema moderate at knee min to ankle.  CMS: edvin incisional numbness, otherwise grossly intact.  AROM Flexion 0-80.  SLR: Able with SLR knee extension.    IMAGING INTERPRETATION:  None today.     ASSESSMENT:  Maeve Lovell is a 64 year old female S/P Right TKA.    Healing, progressing.    PLAN:  - Surgery discussed, images reviewed if applicable, and all questions were answered at this time.  - Suture tails removed with sterile technique, steri-strips applied in usual fashion.  - care instructions given and verbally acknowledged.  - Medications: may resume RA meds.  - Physical Therapy: per therapy.  - AAT.    Return to clinic 4 weeks for x ray.    Ti Pereira PA-C    Dept. Orthopedic Surgery  [facility]   9/9/2024

## 2024-09-11 ENCOUNTER — THERAPY VISIT (OUTPATIENT)
Dept: PHYSICAL THERAPY | Facility: CLINIC | Age: 64
End: 2024-09-11
Attending: STUDENT IN AN ORGANIZED HEALTH CARE EDUCATION/TRAINING PROGRAM
Payer: MEDICARE

## 2024-09-11 DIAGNOSIS — M17.11 PRIMARY OSTEOARTHRITIS OF RIGHT KNEE: Primary | ICD-10-CM

## 2024-09-11 DIAGNOSIS — Z96.651 AFTERCARE FOLLOWING RIGHT KNEE JOINT REPLACEMENT SURGERY: ICD-10-CM

## 2024-09-11 DIAGNOSIS — Z47.1 AFTERCARE FOLLOWING RIGHT KNEE JOINT REPLACEMENT SURGERY: ICD-10-CM

## 2024-09-11 PROCEDURE — 97530 THERAPEUTIC ACTIVITIES: CPT | Mod: GP | Performed by: PHYSICAL THERAPIST

## 2024-09-11 PROCEDURE — 97110 THERAPEUTIC EXERCISES: CPT | Mod: GP | Performed by: PHYSICAL THERAPIST

## 2024-09-11 PROCEDURE — 97140 MANUAL THERAPY 1/> REGIONS: CPT | Mod: GP | Performed by: PHYSICAL THERAPIST

## 2024-09-17 ENCOUNTER — TELEPHONE (OUTPATIENT)
Dept: ORTHOPEDICS | Facility: CLINIC | Age: 64
End: 2024-09-17
Payer: MEDICARE

## 2024-09-17 NOTE — TELEPHONE ENCOUNTER
[facility] Call Center    Phone Message    May a detailed message be left on voicemail: yes     Reason for Call: Patient asking if MRI is Ok to Have  . Other Doctor Asking for MRI ( [facility] ) Please call Patient for Details. Thanks    Action Taken: Message routed to:  [facility] [place]    Travel Screening: Not Applicable     Date of Service:

## 2024-09-17 NOTE — TELEPHONE ENCOUNTER
After rounding with provider, determined it is okay for patient to have MRI,    Left voicemail described above, patient can call back with any further questions.    Dimitris Kay ATC

## 2024-09-18 ENCOUNTER — THERAPY VISIT (OUTPATIENT)
Dept: PHYSICAL THERAPY | Facility: CLINIC | Age: 64
End: 2024-09-18
Attending: STUDENT IN AN ORGANIZED HEALTH CARE EDUCATION/TRAINING PROGRAM
Payer: MEDICARE

## 2024-09-18 DIAGNOSIS — Z96.651 AFTERCARE FOLLOWING RIGHT KNEE JOINT REPLACEMENT SURGERY: ICD-10-CM

## 2024-09-18 DIAGNOSIS — Z47.1 AFTERCARE FOLLOWING RIGHT KNEE JOINT REPLACEMENT SURGERY: ICD-10-CM

## 2024-09-18 DIAGNOSIS — M17.11 PRIMARY OSTEOARTHRITIS OF RIGHT KNEE: Primary | ICD-10-CM

## 2024-09-18 PROCEDURE — 97116 GAIT TRAINING THERAPY: CPT | Mod: GP | Performed by: PHYSICAL THERAPIST

## 2024-09-18 PROCEDURE — 97110 THERAPEUTIC EXERCISES: CPT | Mod: GP | Performed by: PHYSICAL THERAPIST

## 2024-09-18 PROCEDURE — 97140 MANUAL THERAPY 1/> REGIONS: CPT | Mod: GP | Performed by: PHYSICAL THERAPIST

## 2024-09-20 ENCOUNTER — THERAPY VISIT (OUTPATIENT)
Dept: PHYSICAL THERAPY | Facility: CLINIC | Age: 64
End: 2024-09-20
Payer: MEDICARE

## 2024-09-20 DIAGNOSIS — M17.11 PRIMARY OSTEOARTHRITIS OF RIGHT KNEE: ICD-10-CM

## 2024-09-20 DIAGNOSIS — Z96.651 S/P TKR (TOTAL KNEE REPLACEMENT) USING CEMENT, RIGHT: Primary | ICD-10-CM

## 2024-09-20 PROCEDURE — 97116 GAIT TRAINING THERAPY: CPT | Mod: GP

## 2024-09-20 PROCEDURE — 97110 THERAPEUTIC EXERCISES: CPT | Mod: GP

## 2024-10-03 ENCOUNTER — ANCILLARY PROCEDURE (OUTPATIENT)
Dept: GENERAL RADIOLOGY | Facility: CLINIC | Age: 64
End: 2024-10-03
Attending: PHYSICIAN ASSISTANT
Payer: MEDICARE

## 2024-10-03 ENCOUNTER — OFFICE VISIT (OUTPATIENT)
Dept: ORTHOPEDICS | Facility: CLINIC | Age: 64
End: 2024-10-03
Payer: MEDICARE

## 2024-10-03 VITALS
WEIGHT: 186 LBS | HEIGHT: 60 IN | DIASTOLIC BLOOD PRESSURE: 86 MMHG | BODY MASS INDEX: 36.52 KG/M2 | SYSTOLIC BLOOD PRESSURE: 134 MMHG

## 2024-10-03 DIAGNOSIS — M17.11 PRIMARY OSTEOARTHRITIS OF RIGHT KNEE: ICD-10-CM

## 2024-10-03 DIAGNOSIS — G89.18 ACUTE POST-OPERATIVE PAIN: ICD-10-CM

## 2024-10-03 DIAGNOSIS — Z47.89 ORTHOPEDIC AFTERCARE: Primary | ICD-10-CM

## 2024-10-03 PROCEDURE — 99024 POSTOP FOLLOW-UP VISIT: CPT | Performed by: PHYSICIAN ASSISTANT

## 2024-10-03 PROCEDURE — 73562 X-RAY EXAM OF KNEE 3: CPT | Mod: TC | Performed by: STUDENT IN AN ORGANIZED HEALTH CARE EDUCATION/TRAINING PROGRAM

## 2024-10-03 ASSESSMENT — KOOS JR
HOW SEVERE IS YOUR KNEE STIFFNESS AFTER FIRST WAKING IN MORNING: MILD
BENDING TO THE FLOOR TO PICK UP OBJECT: MILD
TWISING OR PIVOTING ON KNEE: MILD
KOOS JR SCORING: 68.28
GOING UP OR DOWN STAIRS: MILD
STANDING UPRIGHT: MILD
RISING FROM SITTING: MILD
STRAIGHTENING KNEE FULLY: MILD

## 2024-10-03 NOTE — PATIENT INSTRUCTIONS
Please avoid invasive procedure for 6 months following your surgery.  After the 6 months it will be recommended you take oral antibiotics prior to any procedures.  Please notify any providers of your implant prior to invasive procedures and if you have any risk factors or your health has changed since the last procedure. An antibiotic may be prescribed for you.    You may increase your activities as you can tolerate them.  Walking is a good activity.    Continue your exercises for several months after the conclusion of formal physical therapy.    Please follow up as needed in the office until follow up with your surgeon at 6 months and at 1 year from the surgery date for evaluation.   About 8/21/2025.

## 2024-10-03 NOTE — PROGRESS NOTES
HISTORY OF PRESENT ILLNESS:    Maeve Lovell is a 64 year old female who is seen in follow up for right TKA done by Dr. Thomas on 8/21/2024.  Present symptoms: Pt reports pain is improving since last visit.  Treatments include physical therapy, home exercise program, 2x per day, and ice.  Using cane for ambulation.  ROM is improving, but she is still sore after PT and home exercises.  She states the incision site it tight, otherwise minimal pain.  Asprin for DVT prophylaxis.  No new complaints.  Denies Chest pain, Calve pain, Fever, Chills.    PHYSICAL EXAM:  /86   Ht 1.524 m (5')   Wt 84.4 kg (186 lb)   BMI 36.33 kg/m    Body mass index is 36.33 kg/m .   GENERAL APPEARANCE: healthy, alert, and no distress   PSYCH:  mentation appears normal and affect normal/bright    MSK:  Right:  Knee.  Ambulates: WNG, cane contralateral..  Incision clean and dry, scar present, healing.  Appropriate incisional erythema.   No acute Ecchymosis.  No calve pain on palpation.  Edema mild to moderate at knee none BK..  CMS: edvin incisional numbness, otherwise grossly intact.  AROM Flexion 0-120.  SLR: Able with SLR knee extension.    IMAGING INTERPRETATION:  xr right knee 3 views.    Images demonstrate the postoperative changes of a total knee arthroplasty.  Components appear well seated with no signs of loosening.  There is good anatomic alignment.  There are no signs of patella mal alignment or tracking issues.  No new pathology noted.    See also radiology reading.       ASSESSMENT:  Maeve Lovell is a 64 year old female S/P R TKA.    Doing well.    PLAN:  - Surgery discussed, images reviewed if applicable, and all questions were answered at this time.  - care instructions given and verbally acknowledged.  - Medications: none per ortho.  - Physical Therapy: continue per therapist, then at home for several months.  - AAT.    Return to clinic 6 months post op with DR. Thomas.    Ti Pereira PA-C    Dept.  Orthopedic Surgery  [facility]   10/3/2024      Velcro Wrist Splint

## 2024-10-03 NOTE — Clinical Note
"10/3/2024      Maeve Lovell  [address] [place] [address]      Dear Colleague,    Thank you for referring your patient, Maeve Lovell, to the [facility] [place]. Please see a copy of my visit note below.    HISTORY OF PRESENT ILLNESS:    Maeve Lovell is a 64 year old female who is seen in follow up for right TKA done by Dr. Thomas on 8/21/2024.  Present symptoms: Pt reports pain is improving since last visit.  Treatments include physical therapy, home exercise program, 2x per day, and ice.  Using cane for ambulation.  ROM is improving, but she is still sore after PT and home exercises.  She states the incision site it tight, otherwise minimal pain.  Asprin for DVT prophylaxis.  No new complaints.  Denies Chest pain, Calve pain, Fever, Chills.    PHYSICAL EXAM:  There were no vitals taken for this visit.  There is no height or weight on file to calculate BMI.   GENERAL APPEARANCE: {JANUSZ GENERAL APPEARANCE:50::\"healthy\",\"alert\",\"no distress\"}   PSYCH:  {[facility] EXAM CPE - PSYCH:045744::\"mentation appears normal\",\"affect normal/bright\"}    MSK:  {RIGHT:235023} Knee.  Ambulates: ***.  Incision clean and dry, *** present, healing.  Appropriate incisional erythema.   {YES/NO -default:682174::\"No\"} Ecchymosis ***.  {YES/NO -default:073561::\"No\"} calve pain on palpation.  Edema *** at knee ***.  CMS: edvin incisional numbness, otherwise grossly intact.  AROM Flexion ***.  SLR: Able with *** knee extension.    IMAGING INTERPRETATION:  None today.     ASSESSMENT:  Maeve Lovell is a 64 year old female S/P ***.  ***    PLAN:  - Surgery discussed, images reviewed if applicable, and all questions were answered at this time.  - *** removed with sterile technique, steri-strips applied in usual fashion, care instructions given and verbally acknowledged.  - Medications: ***  - Physical Therapy: {REHAB :928029}  - AAT.    Return to clinic 4 weeks for x ray.    Ti Pereira, " RICARDO    Dept. Orthopedic Surgery  [facility]   10/3/2024         Again, thank you for allowing me to participate in the care of your patient.        Sincerely,        Ti Pereira PA-C

## 2024-10-08 ENCOUNTER — TRANSFERRED RECORDS (OUTPATIENT)
Dept: HEALTH INFORMATION MANAGEMENT | Facility: CLINIC | Age: 64
End: 2024-10-08
Payer: MEDICARE

## 2024-10-09 ENCOUNTER — THERAPY VISIT (OUTPATIENT)
Dept: PHYSICAL THERAPY | Facility: CLINIC | Age: 64
End: 2024-10-09
Payer: MEDICARE

## 2024-10-09 DIAGNOSIS — Z96.651 S/P TKR (TOTAL KNEE REPLACEMENT) USING CEMENT, RIGHT: Primary | ICD-10-CM

## 2024-10-09 DIAGNOSIS — Z96.651 AFTERCARE FOLLOWING RIGHT KNEE JOINT REPLACEMENT SURGERY: ICD-10-CM

## 2024-10-09 DIAGNOSIS — Z47.1 AFTERCARE FOLLOWING RIGHT KNEE JOINT REPLACEMENT SURGERY: ICD-10-CM

## 2024-10-09 DIAGNOSIS — M17.11 PRIMARY OSTEOARTHRITIS OF RIGHT KNEE: ICD-10-CM

## 2024-10-09 PROCEDURE — 97110 THERAPEUTIC EXERCISES: CPT | Mod: GP | Performed by: PHYSICAL THERAPIST

## 2024-10-09 PROCEDURE — 97530 THERAPEUTIC ACTIVITIES: CPT | Mod: GP | Performed by: PHYSICAL THERAPIST

## 2024-10-16 ENCOUNTER — THERAPY VISIT (OUTPATIENT)
Dept: PHYSICAL THERAPY | Facility: CLINIC | Age: 64
End: 2024-10-16
Payer: MEDICARE

## 2024-10-16 DIAGNOSIS — Z96.651 S/P TKR (TOTAL KNEE REPLACEMENT) USING CEMENT, RIGHT: Primary | ICD-10-CM

## 2024-10-16 DIAGNOSIS — Z47.1 AFTERCARE FOLLOWING RIGHT KNEE JOINT REPLACEMENT SURGERY: ICD-10-CM

## 2024-10-16 DIAGNOSIS — M17.11 PRIMARY OSTEOARTHRITIS OF RIGHT KNEE: ICD-10-CM

## 2024-10-16 DIAGNOSIS — Z96.651 AFTERCARE FOLLOWING RIGHT KNEE JOINT REPLACEMENT SURGERY: ICD-10-CM

## 2024-10-16 PROCEDURE — 97110 THERAPEUTIC EXERCISES: CPT | Mod: GP | Performed by: PHYSICAL THERAPIST

## 2024-10-16 PROCEDURE — 97140 MANUAL THERAPY 1/> REGIONS: CPT | Mod: GP | Performed by: PHYSICAL THERAPIST

## 2024-10-21 ENCOUNTER — MYC MEDICAL ADVICE (OUTPATIENT)
Dept: ORTHOPEDICS | Facility: CLINIC | Age: 64
End: 2024-10-21
Payer: MEDICARE

## 2024-10-21 ENCOUNTER — TELEPHONE (OUTPATIENT)
Dept: ORTHOPEDICS | Facility: CLINIC | Age: 64
End: 2024-10-21
Payer: MEDICARE

## 2024-10-21 NOTE — TELEPHONE ENCOUNTER
Other: pt of Dr. Thomas had surgery and is noting the incision closer to her thigh is redder at the top in the last few days.  There is no puss or drainage.  There is a lot of warmth after using it, if just sitting not a lot of warth.     Pt does not feel feverish.  Please give her a call, should she come in.  Doesn't want to take Dr. Vasquez time up.  I asked her to send a picture in through her [employer].  Pt has an 11 appt with her primary.     Could we send this information to you in [employer] or would you prefer to receive a phone call?:   Patient would prefer a phone call   Okay to leave a detailed message?: Yes at Cell number on file:    Telephone Information:   Mobile [phone]

## 2024-10-21 NOTE — TELEPHONE ENCOUNTER
Please also see duplicate telephone encounter dated 10/21/24. Closing encounter.     KELLY Monroy RN

## 2024-10-21 NOTE — TELEPHONE ENCOUNTER
Please also see [employer] message dated 10/21/24 with photos in addition to message below.     Phone call to patient and clarified the redness she is speaking of is approximately dime sized at the proximal end of the incision. She denies drainage or increased pain specifically in that spot. However, she states she did overdo it yesterday and was very active without much sitting during the day. In the afternoon she did her exercises and had a lot of burning in the knee. The incision felt warm and burning as well.     Discussed that at this point she does not need to come in. Recommended she monitor for it worsening such as increased pain, or opening and draining, or increased redness. She is to call if it worsens. She verbalized understanding.     KELLY Monroy RN

## 2024-10-23 ENCOUNTER — THERAPY VISIT (OUTPATIENT)
Dept: PHYSICAL THERAPY | Facility: CLINIC | Age: 64
End: 2024-10-23
Payer: MEDICARE

## 2024-10-23 DIAGNOSIS — M17.11 PRIMARY OSTEOARTHRITIS OF RIGHT KNEE: ICD-10-CM

## 2024-10-23 DIAGNOSIS — Z96.651 AFTERCARE FOLLOWING RIGHT KNEE JOINT REPLACEMENT SURGERY: ICD-10-CM

## 2024-10-23 DIAGNOSIS — Z96.651 S/P TKR (TOTAL KNEE REPLACEMENT) USING CEMENT, RIGHT: Primary | ICD-10-CM

## 2024-10-23 DIAGNOSIS — Z47.1 AFTERCARE FOLLOWING RIGHT KNEE JOINT REPLACEMENT SURGERY: ICD-10-CM

## 2024-10-23 PROCEDURE — 97110 THERAPEUTIC EXERCISES: CPT | Mod: GP | Performed by: PHYSICAL THERAPIST

## 2024-10-23 PROCEDURE — 97140 MANUAL THERAPY 1/> REGIONS: CPT | Mod: GP | Performed by: PHYSICAL THERAPIST

## 2024-10-30 ENCOUNTER — THERAPY VISIT (OUTPATIENT)
Dept: PHYSICAL THERAPY | Facility: CLINIC | Age: 64
End: 2024-10-30
Payer: MEDICARE

## 2024-10-30 DIAGNOSIS — Z47.1 AFTERCARE FOLLOWING RIGHT KNEE JOINT REPLACEMENT SURGERY: ICD-10-CM

## 2024-10-30 DIAGNOSIS — M17.11 PRIMARY OSTEOARTHRITIS OF RIGHT KNEE: ICD-10-CM

## 2024-10-30 DIAGNOSIS — Z96.651 AFTERCARE FOLLOWING RIGHT KNEE JOINT REPLACEMENT SURGERY: ICD-10-CM

## 2024-10-30 DIAGNOSIS — Z96.651 S/P TKR (TOTAL KNEE REPLACEMENT) USING CEMENT, RIGHT: Primary | ICD-10-CM

## 2024-10-30 PROCEDURE — 97110 THERAPEUTIC EXERCISES: CPT | Mod: GP | Performed by: PHYSICAL THERAPIST

## 2024-10-30 PROCEDURE — 97140 MANUAL THERAPY 1/> REGIONS: CPT | Mod: GP | Performed by: PHYSICAL THERAPIST

## 2024-11-18 ENCOUNTER — THERAPY VISIT (OUTPATIENT)
Dept: PHYSICAL THERAPY | Facility: CLINIC | Age: 64
End: 2024-11-18
Payer: MEDICARE

## 2024-11-18 DIAGNOSIS — Z96.651 AFTERCARE FOLLOWING RIGHT KNEE JOINT REPLACEMENT SURGERY: ICD-10-CM

## 2024-11-18 DIAGNOSIS — Z96.651 S/P TKR (TOTAL KNEE REPLACEMENT) USING CEMENT, RIGHT: Primary | ICD-10-CM

## 2024-11-18 DIAGNOSIS — M17.11 PRIMARY OSTEOARTHRITIS OF RIGHT KNEE: ICD-10-CM

## 2024-11-18 DIAGNOSIS — Z47.1 AFTERCARE FOLLOWING RIGHT KNEE JOINT REPLACEMENT SURGERY: ICD-10-CM

## 2024-11-18 PROCEDURE — 97140 MANUAL THERAPY 1/> REGIONS: CPT | Mod: GP | Performed by: PHYSICAL THERAPIST

## 2024-11-18 PROCEDURE — 97110 THERAPEUTIC EXERCISES: CPT | Mod: GP | Performed by: PHYSICAL THERAPIST

## 2024-11-26 ENCOUNTER — THERAPY VISIT (OUTPATIENT)
Dept: PHYSICAL THERAPY | Facility: CLINIC | Age: 64
End: 2024-11-26
Payer: MEDICARE

## 2024-11-26 DIAGNOSIS — Z47.1 AFTERCARE FOLLOWING RIGHT KNEE JOINT REPLACEMENT SURGERY: ICD-10-CM

## 2024-11-26 DIAGNOSIS — M17.11 PRIMARY OSTEOARTHRITIS OF RIGHT KNEE: ICD-10-CM

## 2024-11-26 DIAGNOSIS — Z96.651 S/P TKR (TOTAL KNEE REPLACEMENT) USING CEMENT, RIGHT: Primary | ICD-10-CM

## 2024-11-26 DIAGNOSIS — Z96.651 AFTERCARE FOLLOWING RIGHT KNEE JOINT REPLACEMENT SURGERY: ICD-10-CM

## 2024-11-26 PROCEDURE — 97140 MANUAL THERAPY 1/> REGIONS: CPT | Mod: GP | Performed by: PHYSICAL THERAPIST

## 2024-11-26 PROCEDURE — 97110 THERAPEUTIC EXERCISES: CPT | Mod: GP | Performed by: PHYSICAL THERAPIST

## 2024-11-26 NOTE — PROGRESS NOTES
[facility]                                                                                   OUTPATIENT PHYSICAL THERAPY    PLAN OF TREATMENT FOR OUTPATIENT REHABILITATION   Patient's Last Name, First Name, Maeve Velasquez YOB: 1960   Provider's Name   [facility]   Medical Record No.  [id number]     Onset Date: 08/21/24  Start of Care Date: 08/28/24     Medical Diagnosis:  S/P TKA      PT Treatment Diagnosis:  Knee pain, knee swelling, knee weakness Plan of Treatment  Frequency/Duration: 2x/month/ 2 months    Certification date from 11/21/24 to 01/21/25         See note for plan of treatment details and functional goals     Fannie Stewart, PT                         I CERTIFY THE NEED FOR THESE SERVICES FURNISHED UNDER        THIS PLAN OF TREATMENT AND WHILE UNDER MY CARE     (Physician attestation of this document indicates review and certification of the therapy plan).              Referring Provider:  Keon Thomas    Initial Assessment  See Epic Evaluation- Start of Care Date: 08/28/24 11/26/24 0500   Appointment Info   Signing clinician's name / credentials Fannie Stewart, PT   Total/Authorized Visits 16 (PT)   Visits Used 12   Medical Diagnosis S/P TKA   PT Tx Diagnosis Knee pain, knee swelling, knee weakness   Progress Note/Certification   Start of Care Date 08/28/24   Onset of illness/injury or Date of Surgery 08/21/24   Therapy Frequency 2x/month   Predicted Duration 2 months   Certification date from 11/21/24   Certification date to 01/21/25   GOALS   PT Goals 2;3   PT Goal 1   Goal Identifier Stairs   Goal Description Patient will ascend and descend stairs with railing, without knee pain   Rationale to maximize safety and independence within the home   Goal Progress Down feels hard   Target Date 12/24/24   PT Goal 2   Goal Identifier Ambulation   Goal Description Patient will ambulate 20 minutes with normal  gait mechanics without knee pain   Rationale to maximize safety and independence within the community   Target Date 11/27/24   Date Met 11/18/24   PT Goal 3   Goal Identifier Squatting   Goal Description Patient to squat to reach the bottom kitchen cupboard   Rationale to maximize safety and independence with performance of ADLs and functional tasks   Target Date 01/21/25   Subjective Report   Subjective Report Ups and downs. Feels good in the morning, less good in the evenings. Still occasionally icing, wonders if that is normal. Down stairs just doesn't feel like her knee will bend that way.   Objective Measures   Objective Measures Objective Measure 1   Objective Measure 1   Objective Measure Easy revolutions forward on the bike.   Details ROM:0-0-120 (end of session), Able to correct extensor lag with cueing.   Treatment Interventions (PT)   Interventions Therapeutic Procedure/Exercise;Therapeutic Activity;Manual Therapy;Gait Training   Therapeutic Procedure/Exercise   Therapeutic Procedures: strength, endurance, ROM, flexibility minutes (04977) 20   Therapeutic Procedures Ther Proc 2   Ther Proc 1 heel raises   Ther Proc 1 - Details 2x10   Ther Proc 2 Slow march, tandem walking, SLS.   Ther Proc 2 - Details Bike x 5 minutes, forward revolutions.   PTRx Ther Proc 1 Heel Slides   PTRx Ther Proc 1 - Details 10x   PTRx Ther Proc 2 Seated Heel Slide with Foot on the Floor   PTRx Ther Proc 2 - Details 30 sec hold   PTRx Ther Proc 3 Hip Flexion Straight Leg Raise   PTRx Ther Proc 3 - Details Supine - cued for straight leg.   PTRx Ther Proc 4 Knee Bends   PTRx Ther Proc 4 - Details 2x5 B UE support limited depth weight shifts away   PTRx Ther Proc 5 Short Arc Knee Extension (Long Sitting)   PTRx Ther Proc 5 - Details No Notes   PTRx Ther Proc 6 Short Arc Knee Extension   PTRx Ther Proc 6 - Details No Notes   PTRx Ther Proc 7 Sidestep   PTRx Ther Proc 7 - Details No band today.   PTRx Ther Proc 8 Lateral Step Down    PTRx Ther Proc 8 - Details Cued to control with right leg.   Skilled Intervention Appropriate interventions to address observed deficits   Therapeutic Activity   Ther Act 1 Stairs - demonstrated decending stair in clinic.   PTRx Ther Act 1 Education Sheet Knee   PTRx Ther Act 1 - Details No Notes   Skilled Intervention Appropriate interventions to address observed deficits   Neuromuscular Re-education   PTRx Neuro Re-ed 1 Single Leg Stance - Balance Right Foot   PTRx Neuro Re-ed 1 - Details No Notes   Gait Training   Gait Training Gait 2   Gait 1 Single end cane ambulation.   Gait 1 - Details Heel to toe, cane in opposite hand, appropriate speed/pace.   Gait 2 FWW   Gait 2 - Details slow, exaggerated walking to emphasize normal mechanics and incr SL stance time/quad activation   Skilled Intervention appropriate grading and guarding for safety - SBA   Manual Therapy   Manual Therapy: Mobilization, MFR, MLD, friction massage minutes (73459) 11   Manual Therapy 1 Anterior knee at end range of motion, scar mobs, patellar mobs.   Manual Therapy 1 - Details Grade 3 TF mobilization at tibia into flexion.   Skilled Intervention Appropriate interventions to address observed deficits   Patient Response/Progress Tolerated well, will continue at next visit   Education   Learner/Method Patient;No Barriers to Learning   Total Session Time   Timed Code Treatment Minutes 31   Total Treatment Time (sum of timed and untimed services) 31

## 2024-12-03 ENCOUNTER — THERAPY VISIT (OUTPATIENT)
Dept: PHYSICAL THERAPY | Facility: CLINIC | Age: 64
End: 2024-12-03
Payer: MEDICARE

## 2024-12-03 ENCOUNTER — TELEPHONE (OUTPATIENT)
Dept: ORTHOPEDICS | Facility: CLINIC | Age: 64
End: 2024-12-03

## 2024-12-03 DIAGNOSIS — Z47.1 AFTERCARE FOLLOWING RIGHT KNEE JOINT REPLACEMENT SURGERY: ICD-10-CM

## 2024-12-03 DIAGNOSIS — Z96.651 AFTERCARE FOLLOWING RIGHT KNEE JOINT REPLACEMENT SURGERY: ICD-10-CM

## 2024-12-03 DIAGNOSIS — M17.11 PRIMARY OSTEOARTHRITIS OF RIGHT KNEE: ICD-10-CM

## 2024-12-03 DIAGNOSIS — Z96.651 S/P TKR (TOTAL KNEE REPLACEMENT) USING CEMENT, RIGHT: Primary | ICD-10-CM

## 2024-12-03 PROCEDURE — 97110 THERAPEUTIC EXERCISES: CPT | Mod: GP | Performed by: PHYSICAL THERAPIST

## 2024-12-03 NOTE — TELEPHONE ENCOUNTER
Discussed with provider, and provider would like to talk to patient virtually at 6 month follow up. Patient was called and agreed with the plan. She was scheduled accordingly and will call with any other concerns.    Dimitris Kay ATC

## 2024-12-03 NOTE — TELEPHONE ENCOUNTER
Other: Patient had knee replacement surgery on 08/21/24 w/ Dr. Thomas, she is wondering when should she schedule a follow up visit with him.     Could we send this information to you in [employer] or would you prefer to receive a phone call?:   Patient would prefer a phone call   Okay to leave a detailed message?: Yes at Cell number on file:    Telephone Information:   Mobile [phone]

## 2024-12-03 NOTE — TELEPHONE ENCOUNTER
Talked with patient and explained she should follow up with Dr. Thomas in February for her 6 month post op. Patient states she would like to avoid scheduling appointments during the winter. Patient would like to be seen in the spring if appropriate or do a video visit.      Please advise if you would like to see patient for a virtual visit in February or if you are ok for the patient to wait until spring.   Dimitris Kay ATC

## 2025-01-04 ENCOUNTER — HEALTH MAINTENANCE LETTER (OUTPATIENT)
Age: 65
End: 2025-01-04

## 2025-01-07 ENCOUNTER — THERAPY VISIT (OUTPATIENT)
Dept: PHYSICAL THERAPY | Facility: CLINIC | Age: 65
End: 2025-01-07
Payer: MEDICARE

## 2025-01-07 DIAGNOSIS — M17.11 PRIMARY OSTEOARTHRITIS OF RIGHT KNEE: ICD-10-CM

## 2025-01-07 DIAGNOSIS — Z96.651 AFTERCARE FOLLOWING RIGHT KNEE JOINT REPLACEMENT SURGERY: ICD-10-CM

## 2025-01-07 DIAGNOSIS — Z47.1 AFTERCARE FOLLOWING RIGHT KNEE JOINT REPLACEMENT SURGERY: ICD-10-CM

## 2025-01-07 DIAGNOSIS — Z96.651 S/P TKR (TOTAL KNEE REPLACEMENT) USING CEMENT, RIGHT: Primary | ICD-10-CM

## 2025-01-07 PROCEDURE — 97110 THERAPEUTIC EXERCISES: CPT | Mod: GP | Performed by: PHYSICAL THERAPIST

## 2025-02-10 ENCOUNTER — VIRTUAL VISIT (OUTPATIENT)
Dept: ORTHOPEDICS | Facility: CLINIC | Age: 65
End: 2025-02-10
Payer: MEDICARE

## 2025-02-10 DIAGNOSIS — Z96.651 S/P TOTAL KNEE ARTHROPLASTY, RIGHT: Primary | ICD-10-CM

## 2025-02-10 PROCEDURE — 98012 SYNCH AUDIO-ONLY EST SF 10: CPT | Performed by: STUDENT IN AN ORGANIZED HEALTH CARE EDUCATION/TRAINING PROGRAM

## 2025-02-10 NOTE — LETTER
2/10/2025      Maeve Lovell  [address]  [place] [address]      Dear Colleague,    Thank you for referring your patient, Maeve Lovell, to the [facility] [place]. Please see a copy of my visit note below.    Maeve is a 64 year old who is being evaluated via a billable video visit.    How would you like to obtain your AVS? MyChart  If the video visit is dropped, the invitation should be resent by: Text to cell phone: [phone]  Will anyone else be joining your video visit? No    Video-Visit Details    Type of service:  Video Visit   Video End Time:11:46 AM  Originating Location (pt. Location): Home    Distant Location (provider location):  On-site  Platform used for Video Visit: Other: telephone      CC: 6-month status post right total knee arthroplasty    This is a telephone encounter as patient was unable to secure transport.  Unfortunately her medial cut out and we completed the encounter over the telephone.  She is now 6-month status post right total knee arthroplasty.  She is very happy with her right knee.  She notes some occasional soreness in her quad going up and down the stairs but states she has been able to do stairs in years.  She otherwise has no pain in her knee.  She is using her oral antibiotic predental prophylaxis.  She states that with physical therapy she was able to achieve full extension to 120 degrees any flexion.  Overall she is very happy with her knee.  She will plan to follow-up with me in 6 months in person for a 1 year follow-up appointment    10 minutes were spent on the phone with the patient.    Keon Thomas MD    [facility]   Department of Orthopedic Surgery      Again, thank you for allowing me to participate in the care of your patient.        Sincerely,        Keon Thomas MD    Electronically signed

## 2025-02-10 NOTE — PROGRESS NOTES
Maeve is a 64 year old who is being evaluated via a billable video visit.    How would you like to obtain your AVS? MyChart  If the video visit is dropped, the invitation should be resent by: Text to cell phone: [phone]  Will anyone else be joining your video visit? No    Video-Visit Details    Type of service:  Video Visit   Video End Time:11:46 AM  Originating Location (pt. Location): Home    Distant Location (provider location):  On-site  Platform used for Video Visit: Other: telephone      CC: 6-month status post right total knee arthroplasty    This is a telephone encounter as patient was unable to secure transport.  Unfortunately her medial cut out and we completed the encounter over the telephone.  She is now 6-month status post right total knee arthroplasty.  She is very happy with her right knee.  She notes some occasional soreness in her quad going up and down the stairs but states she has been able to do stairs in years.  She otherwise has no pain in her knee.  She is using her oral antibiotic predental prophylaxis.  She states that with physical therapy she was able to achieve full extension to 120 degrees any flexion.  Overall she is very happy with her knee.  She will plan to follow-up with me in 6 months in person for a 1 year follow-up appointment    10 minutes were spent on the phone with the patient.    Keon Thomas MD    [facility]   Department of Orthopedic Surgery

## 2025-02-10 NOTE — PROGRESS NOTES
"Maeve is a 64 year old who is being evaluated via a billable video visit.    How would you like to obtain your AVS? MyChart  If the video visit is dropped, the invitation should be resent by:   Will anyone else be joining your video visit? No  {If patient encounters technical issues they should call [phone] :540476}  Video-Visit Details    Type of service:  Video Visit   Video End Time:{video visit start/end time for provider to select:690763}  Originating Location (pt. Location): {video visit patient location:865501::\"Home\"}  {PROVIDER LOCATION On-site should be selected for visits conducted from your clinic location or adjoining [facility] hospital, academic office, or other nearby [facility] building. Off-site should be selected for all other provider locations, including home:607013}  Distant Location (provider location):  {virtual location provider:035345}  Platform used for Video Visit: {Virtual Visit Platforms:595461::\"[employer]\"}    "

## 2025-04-08 ENCOUNTER — OFFICE VISIT (OUTPATIENT)
Dept: ORTHOPEDICS | Facility: CLINIC | Age: 65
End: 2025-04-08
Payer: MEDICARE

## 2025-04-08 DIAGNOSIS — M76.812 ANTERIOR TIBIALIS TENDINITIS OF LEFT LEG: ICD-10-CM

## 2025-04-08 DIAGNOSIS — M76.822 TIBIALIS POSTERIOR TENDINITIS, LEFT: Primary | ICD-10-CM

## 2025-04-08 PROCEDURE — G2211 COMPLEX E/M VISIT ADD ON: HCPCS | Performed by: FAMILY MEDICINE

## 2025-04-08 PROCEDURE — 99213 OFFICE O/P EST LOW 20 MIN: CPT | Performed by: FAMILY MEDICINE

## 2025-04-08 RX ORDER — FOLIC ACID 1 MG/1
1 TABLET ORAL EVERY 24 HOURS
COMMUNITY
Start: 2025-03-31

## 2025-04-08 RX ORDER — METHOTREXATE 2.5 MG/1
15 TABLET ORAL WEEKLY
COMMUNITY
Start: 2025-03-31

## 2025-04-08 NOTE — PROGRESS NOTES
ASSESSMENT & PLAN  Patient Instructions     1. Tibialis posterior tendinitis, left    2. Anterior tibialis tendinitis of left leg      - Patient has acute left ankle pain due to posterior and anterior tibialis tendinitis  -Patient will purchase an over-the-counter lace up ankle brace.  Patient was shown multiple options online  -Patient will start formal physical therapy and home exercise program to strengthen and stabilize her lower leg muscles  -Patient is already on oral prednisone for autoimmune conditions and so we will avoid further oral anti-inflammatory medications.  Patient may continue with Tylenol as needed for pain  -Patient will ice ankle for pain as well  -Patient will continue to follow-up with me for further treatment and recommendations.  To consider full immobilization in a boot versus advanced imaging  -Call direct clinic number [[phone]] at any time with questions or concerns.    Albert Yeo MD [facility] and [facility]  [facility]        -----    SUBJECTIVE:  Maeve Lovell is a 64 year old female who is seen in follow-up for left ankle pain.They were last seen for this issue on 01/08/2024.     Since their last visit reports worsening pain. They indicate that their current pain level is 6/10. Pain is now located over posterior ankle. They have tried rest/activity avoidance and Tylenol.      The patient is seen by themselves.    Patient's past medical, surgical, social, and family histories were reviewed today and no changes are noted.    REVIEW OF SYSTEMS:  Constitutional: NEGATIVE for fever, chills, change in weight  Skin: NEGATIVE for worrisome rashes, moles or lesions  GI/: NEGATIVE for bowel or bladder changes  Neuro: NEGATIVE for weakness, dizziness or paresthesias    OBJECTIVE:  There were no vitals taken for this visit.   General: healthy, alert and in no distress  HEENT: no scleral icterus or conjunctival erythema  Skin: no suspicious  lesions or rash. No jaundice.  CV: regular rhythm by palpation, no pedal edema  Resp: normal respiratory effort without conversational dyspnea   Psych: normal mood and affect  Gait: normal steady gait with appropriate coordination and balance  Neuro: normal light touch sensory exam of the extremities.    MSK:  LEFT ANKLE  Inspection:    No swelling, redness, edema or ecchymosis is observed  Palpation:    Tender about the posterior and anterior tibialis tendons. Remainder of bony and ligamentous landmarks are nontender.  Range of Motion:     Plantarflexion within normal limits / dorsiflexion limited slightly by pain / inversion limited slightly by pain / eversion within normal limits  Strength:    limited slightly by pain  Special Tests:    negative anterior drawer, negative talar tilt, negative valgus stress, negative forced external rotation/eversion, negative Archer sign, negative squeeze test.     LEFT FOOT  Inspection:    no swelling over the dorsal midfoot  Palpation:    Tender about the posterior tibial tendon at medial malleolus and anterior tibialis tendons  Range of Motion:      Active ankle range of motion - full    Passive ankle range of motion - full  Strength:    Intrinsic foot musculature strength - grossly intact    Ankle strength - grossly intact  Special Tests:    Positive: none      Independent visualization of the below image:      Albert Yeo MD, [facility]

## 2025-04-08 NOTE — LETTER
4/8/2025      Maeve Lovell  [address]  [place] [address]      Dear Colleague,    Thank you for referring your patient, Maeve Lovell, to the [facility] [place]. Please see a copy of my visit note below.    ASSESSMENT & PLAN  Patient Instructions     1. Tibialis posterior tendinitis, left    2. Anterior tibialis tendinitis of left leg      - Patient has acute left ankle pain due to posterior and anterior tibialis tendinitis  -Patient will purchase an over-the-counter lace up ankle brace.  Patient was shown multiple options online  -Patient will start formal physical therapy and home exercise program to strengthen and stabilize her lower leg muscles  -Patient is already on oral prednisone for autoimmune conditions and so we will avoid further oral anti-inflammatory medications.  Patient may continue with Tylenol as needed for pain  -Patient will ice ankle for pain as well  -Patient will continue to follow-up with me for further treatment and recommendations.  To consider full immobilization in a boot versus advanced imaging  -Call direct clinic number [[phone]] at any time with questions or concerns.    Albert Yeo MD [facility] Orthopedics and Sports Medicine  [facility]        -----    SUBJECTIVE:  Maeve Lovell is a 64 year old female who is seen in follow-up for left ankle pain.They were last seen for this issue on 01/08/2024.     Since their last visit reports worsening pain. They indicate that their current pain level is 6/10. Pain is now located over posterior ankle. They have tried rest/activity avoidance and Tylenol.      The patient is seen by themselves.    Patient's past medical, surgical, social, and family histories were reviewed today and no changes are noted.    REVIEW OF SYSTEMS:  Constitutional: NEGATIVE for fever, chills, change in weight  Skin: NEGATIVE for worrisome rashes, moles or lesions  GI/: NEGATIVE for  bowel or bladder changes  Neuro: NEGATIVE for weakness, dizziness or paresthesias    OBJECTIVE:  There were no vitals taken for this visit.   General: healthy, alert and in no distress  HEENT: no scleral icterus or conjunctival erythema  Skin: no suspicious lesions or rash. No jaundice.  CV: regular rhythm by palpation, no pedal edema  Resp: normal respiratory effort without conversational dyspnea   Psych: normal mood and affect  Gait: normal steady gait with appropriate coordination and balance  Neuro: normal light touch sensory exam of the extremities.    MSK:  LEFT ANKLE  Inspection:    No swelling, redness, edema or ecchymosis is observed  Palpation:    Tender about the posterior and anterior tibialis tendons. Remainder of bony and ligamentous landmarks are nontender.  Range of Motion:     Plantarflexion within normal limits / dorsiflexion limited slightly by pain / inversion limited slightly by pain / eversion within normal limits  Strength:    limited slightly by pain  Special Tests:    negative anterior drawer, negative talar tilt, negative valgus stress, negative forced external rotation/eversion, negative Archer sign, negative squeeze test.     LEFT FOOT  Inspection:    no swelling over the dorsal midfoot  Palpation:    Tender about the posterior tibial tendon at medial malleolus and anterior tibialis tendons  Range of Motion:      Active ankle range of motion - full    Passive ankle range of motion - full  Strength:    Intrinsic foot musculature strength - grossly intact    Ankle strength - grossly intact  Special Tests:    Positive: none      Independent visualization of the below image:      Albert Yeo MD, [facility]      Again, thank you for allowing me to participate in the care of your patient.        Sincerely,        Albert Yeo, MD    Electronically signed

## 2025-04-08 NOTE — PATIENT INSTRUCTIONS
1. Tibialis posterior tendinitis, left    2. Anterior tibialis tendinitis of left leg      - Patient has acute left ankle pain due to posterior and anterior tibialis tendinitis  -Patient will purchase an over-the-counter lace up ankle brace.  Patient was shown multiple options online  -Patient will start formal physical therapy and home exercise program to strengthen and stabilize her lower leg muscles  -Patient is already on oral prednisone for autoimmune conditions and so we will avoid further oral anti-inflammatory medications.  Patient may continue with Tylenol as needed for pain  -Patient will ice ankle for pain as well  -Patient will continue to follow-up with me for further treatment and recommendations.  To consider full immobilization in a boot versus advanced imaging  -Call direct clinic number [[phone]] at any time with questions or concerns.    Albert Yeo MD [facility]  [facility]

## 2025-07-28 ENCOUNTER — TELEPHONE (OUTPATIENT)
Dept: ORTHOPEDICS | Facility: CLINIC | Age: 65
End: 2025-07-28
Payer: MEDICARE

## 2025-07-28 DIAGNOSIS — Z79.2 NEED FOR ANTIBIOTIC PROPHYLAXIS FOR DENTAL PROCEDURE: Primary | ICD-10-CM

## 2025-07-28 DIAGNOSIS — Z96.651 S/P TOTAL KNEE ARTHROPLASTY, RIGHT: ICD-10-CM

## 2025-07-28 RX ORDER — AMOXICILLIN 500 MG/1
TABLET, FILM COATED ORAL
Qty: 4 TABLET | Refills: 4 | Status: SHIPPED | OUTPATIENT
Start: 2025-07-28

## 2025-07-28 NOTE — TELEPHONE ENCOUNTER
Patient had a R TKA on 8/21/24 and has her first dental appointment since then in August.  She is calling in for antibiotics.    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      Prescription sent to the pharmacy of her choice.

## 2025-07-28 NOTE — TELEPHONE ENCOUNTER
General Call      Reason for Call:  Medication question    What are your questions or concerns:  Pt called stating that pharmacy had called her stating that her prescription for amocixillan has a drug reaction to another medication that she is taking and pt would like to get another type of medication. Please call pt back to advise on this.     Date of last appointment with provider:     Could we send this information to you in [employer] or would you prefer to receive a phone call?:   [phone]

## 2025-07-28 NOTE — TELEPHONE ENCOUNTER
I didn't over ride any warnings to prescribe amoxicillin so I wonder what medication we don't have on our medication list.  I called the pharmacy at [phone] but they were at lunch.  Left a message.    I called the patient and she said the pharmacy told her the interaction is with methotrexate.  We have that on our medication list.  I checked Micromedex and it shows a severe interaction.  I checked all of the antibiotics we prescribe and they all list a severe interaction.    The pharmacist, Gita at [facility], called back and said that all antibiotics flag with drugs that suppress the immune system at the pharmacy level and the patient will be counseled.  She said to prescribe the first line of antibiotics that we would normally.      Notified the patient that the prescription will remain as it is and the pharmacy will review the reason in detail.  I just gave a brief summary of the reason. Suppressing the immune system vs. Bolstering the immune system.

## 2025-08-21 ENCOUNTER — OFFICE VISIT (OUTPATIENT)
Dept: ORTHOPEDICS | Facility: CLINIC | Age: 65
End: 2025-08-21
Payer: MEDICARE

## 2025-08-21 DIAGNOSIS — Z96.651 S/P TOTAL KNEE ARTHROPLASTY, RIGHT: Primary | ICD-10-CM

## (undated) DEVICE — PACK TOTAL KNEE BOXED LATEX FREE PO15TKFCT

## (undated) DEVICE — SET HANDPIECE INTERPULSE W/COAXIAL FAN SPRAY TIP 0210118000

## (undated) DEVICE — GLOVE BIOGEL PI SZ 7.5 40875

## (undated) DEVICE — SU STRATAFIX PDS PLUS 1 CT-1 12" SXPP1A443

## (undated) DEVICE — TOURNIQUET SGL  BLADDER 30"X4" BLUE 5921030135

## (undated) DEVICE — SU MONOCRYL 3-0 PS-1 27" Y936H

## (undated) DEVICE — DRSG STERI STRIP 1/2X4" R1547

## (undated) DEVICE — SYR BULB IRRIG 50ML LATEX FREE 0035280

## (undated) DEVICE — ESU PENCIL SMOKE EVAC W/ROCKER SWITCH 0703-047-000

## (undated) DEVICE — BNDG ELASTIC 6" DBL LENGTH UNSTERILE 6611-16

## (undated) DEVICE — PREP CHLORAPREP 26ML TINTED HI-LITE ORANGE 930815

## (undated) DEVICE — LINEN FULL SHEET 5511

## (undated) DEVICE — SUCTION MANIFOLD NEPTUNE 2 SYS 4 PORT 0702-020-000

## (undated) DEVICE — DRAPE STERI U 1015

## (undated) DEVICE — SOL NACL 0.9% IRRIG 3000ML BAG 2B7477

## (undated) DEVICE — CAST PADDING 6" STERILE 9046S

## (undated) DEVICE — SU VICRYL 0 CT-1 27" J340H

## (undated) DEVICE — HOOD FLYTE W/PEELAWAY 408-800-100

## (undated) DEVICE — BLADE SAW SAGITTAL STRK 18X90X1.27MM HD SYS 6 6118-127-090

## (undated) DEVICE — PITCHER STERILE 1000ML  SSK9004A

## (undated) DEVICE — LINEN ORTHO ACL PACK 5447

## (undated) DEVICE — Device

## (undated) DEVICE — BAG CLEAR TRASH 1.3M 39X33" P4040C

## (undated) DEVICE — GLOVE BIOGEL PI MICRO INDICATOR UNDERGLOVE SZ 8.0 48980

## (undated) DEVICE — BONE CEMENT MIXEVAC III HI VAC KIT  0206-015-000

## (undated) DEVICE — SU VICRYL 2-0 CT-1 27" UND J259H

## (undated) DEVICE — LINEN HALF SHEET 5512

## (undated) RX ORDER — PROPOFOL 10 MG/ML
INJECTION, EMULSION INTRAVENOUS
Status: DISPENSED
Start: 2024-08-21

## (undated) RX ORDER — TRANEXAMIC ACID 650 MG/1
TABLET ORAL
Status: DISPENSED
Start: 2024-08-21

## (undated) RX ORDER — FENTANYL CITRATE 50 UG/ML
INJECTION, SOLUTION INTRAMUSCULAR; INTRAVENOUS
Status: DISPENSED
Start: 2024-08-21

## (undated) RX ORDER — GLYCOPYRROLATE 0.2 MG/ML
INJECTION, SOLUTION INTRAMUSCULAR; INTRAVENOUS
Status: DISPENSED
Start: 2024-08-21

## (undated) RX ORDER — LIDOCAINE HYDROCHLORIDE 10 MG/ML
INJECTION, SOLUTION EPIDURAL; INFILTRATION; INTRACAUDAL; PERINEURAL
Status: DISPENSED
Start: 2024-08-21

## (undated) RX ORDER — ETOMIDATE 2 MG/ML
INJECTION INTRAVENOUS
Status: DISPENSED
Start: 2024-08-21

## (undated) RX ORDER — ONDANSETRON 2 MG/ML
INJECTION INTRAMUSCULAR; INTRAVENOUS
Status: DISPENSED
Start: 2024-08-21

## (undated) RX ORDER — CEFAZOLIN SODIUM/WATER 2 G/20 ML
SYRINGE (ML) INTRAVENOUS
Status: DISPENSED
Start: 2024-08-21

## (undated) RX ORDER — VANCOMYCIN HYDROCHLORIDE 1 G/20ML
INJECTION, POWDER, LYOPHILIZED, FOR SOLUTION INTRAVENOUS
Status: DISPENSED
Start: 2024-08-21

## (undated) RX ORDER — KETOROLAC TROMETHAMINE 30 MG/ML
INJECTION, SOLUTION INTRAMUSCULAR; INTRAVENOUS
Status: DISPENSED
Start: 2024-08-21

## (undated) RX ORDER — TRANEXAMIC ACID 10 MG/ML
INJECTION, SOLUTION INTRAVENOUS
Status: DISPENSED
Start: 2024-08-21

## (undated) RX ORDER — CEFAZOLIN SODIUM 1 G/3ML
INJECTION, POWDER, FOR SOLUTION INTRAMUSCULAR; INTRAVENOUS
Status: DISPENSED
Start: 2024-08-21